# Patient Record
Sex: MALE | Race: OTHER | HISPANIC OR LATINO | URBAN - METROPOLITAN AREA
[De-identification: names, ages, dates, MRNs, and addresses within clinical notes are randomized per-mention and may not be internally consistent; named-entity substitution may affect disease eponyms.]

---

## 2020-03-10 ENCOUNTER — INPATIENT (INPATIENT)
Facility: HOSPITAL | Age: 40
LOS: 2 days | Discharge: HOME | DRG: 192 | End: 2020-03-13
Attending: STUDENT IN AN ORGANIZED HEALTH CARE EDUCATION/TRAINING PROGRAM | Admitting: FAMILY MEDICINE
Payer: COMMERCIAL

## 2020-03-10 VITALS — HEIGHT: 67 IN | WEIGHT: 167.99 LBS

## 2020-03-10 DIAGNOSIS — T82.9XXA UNSPECIFIED COMPLICATION OF CARDIAC AND VASCULAR PROSTHETIC DEVICE, IMPLANT AND GRAFT, INITIAL ENCOUNTER: ICD-10-CM

## 2020-03-10 LAB
ALBUMIN SERPL ELPH-MCNC: 3.9 G/DL — SIGNIFICANT CHANGE UP (ref 3.3–5)
ALP SERPL-CCNC: 105 U/L — SIGNIFICANT CHANGE UP (ref 40–120)
ALT FLD-CCNC: 69 U/L — SIGNIFICANT CHANGE UP (ref 12–78)
ANION GAP SERPL CALC-SCNC: 9 MMOL/L — SIGNIFICANT CHANGE UP (ref 5–17)
APTT BLD: 29.3 SEC — SIGNIFICANT CHANGE UP (ref 27.5–36.3)
AST SERPL-CCNC: 115 U/L — HIGH (ref 15–37)
BASOPHILS # BLD AUTO: 0.04 K/UL — SIGNIFICANT CHANGE UP (ref 0–0.2)
BASOPHILS NFR BLD AUTO: 0.8 % — SIGNIFICANT CHANGE UP (ref 0–2)
BILIRUB SERPL-MCNC: 0.7 MG/DL — SIGNIFICANT CHANGE UP (ref 0.2–1.2)
BUN SERPL-MCNC: 4 MG/DL — LOW (ref 7–23)
CALCIUM SERPL-MCNC: 8.7 MG/DL — SIGNIFICANT CHANGE UP (ref 8.5–10.1)
CHLORIDE SERPL-SCNC: 106 MMOL/L — SIGNIFICANT CHANGE UP (ref 96–108)
CO2 SERPL-SCNC: 24 MMOL/L — SIGNIFICANT CHANGE UP (ref 22–31)
CREAT SERPL-MCNC: 0.96 MG/DL — SIGNIFICANT CHANGE UP (ref 0.5–1.3)
EOSINOPHIL # BLD AUTO: 0.08 K/UL — SIGNIFICANT CHANGE UP (ref 0–0.5)
EOSINOPHIL NFR BLD AUTO: 1.7 % — SIGNIFICANT CHANGE UP (ref 0–6)
GLUCOSE SERPL-MCNC: 147 MG/DL — HIGH (ref 70–99)
HCT VFR BLD CALC: 45.1 % — SIGNIFICANT CHANGE UP (ref 39–50)
HGB BLD-MCNC: 13.7 G/DL — SIGNIFICANT CHANGE UP (ref 13–17)
IMM GRANULOCYTES NFR BLD AUTO: 0 % — SIGNIFICANT CHANGE UP (ref 0–1.5)
INR BLD: 1.16 RATIO — SIGNIFICANT CHANGE UP (ref 0.88–1.16)
LYMPHOCYTES # BLD AUTO: 1.01 K/UL — SIGNIFICANT CHANGE UP (ref 1–3.3)
LYMPHOCYTES # BLD AUTO: 21.3 % — SIGNIFICANT CHANGE UP (ref 13–44)
MAGNESIUM SERPL-MCNC: 2.1 MG/DL — SIGNIFICANT CHANGE UP (ref 1.6–2.6)
MCHC RBC-ENTMCNC: 23.9 PG — LOW (ref 27–34)
MCHC RBC-ENTMCNC: 30.4 GM/DL — LOW (ref 32–36)
MCV RBC AUTO: 78.7 FL — LOW (ref 80–100)
MONOCYTES # BLD AUTO: 0.44 K/UL — SIGNIFICANT CHANGE UP (ref 0–0.9)
MONOCYTES NFR BLD AUTO: 9.3 % — SIGNIFICANT CHANGE UP (ref 2–14)
NEUTROPHILS # BLD AUTO: 3.17 K/UL — SIGNIFICANT CHANGE UP (ref 1.8–7.4)
NEUTROPHILS NFR BLD AUTO: 66.9 % — SIGNIFICANT CHANGE UP (ref 43–77)
PHOSPHATE SERPL-MCNC: 3 MG/DL — SIGNIFICANT CHANGE UP (ref 2.5–4.5)
PLATELET # BLD AUTO: 129 K/UL — LOW (ref 150–400)
POTASSIUM SERPL-MCNC: 4.1 MMOL/L — SIGNIFICANT CHANGE UP (ref 3.5–5.3)
POTASSIUM SERPL-SCNC: 4.1 MMOL/L — SIGNIFICANT CHANGE UP (ref 3.5–5.3)
PROT SERPL-MCNC: 8.5 GM/DL — HIGH (ref 6–8.3)
PROTHROM AB SERPL-ACNC: 12.9 SEC — SIGNIFICANT CHANGE UP (ref 10–12.9)
RBC # BLD: 5.73 M/UL — SIGNIFICANT CHANGE UP (ref 4.2–5.8)
RBC # FLD: 15.9 % — HIGH (ref 10.3–14.5)
SODIUM SERPL-SCNC: 139 MMOL/L — SIGNIFICANT CHANGE UP (ref 135–145)
TROPONIN I SERPL-MCNC: 0.1 NG/ML — HIGH (ref 0.01–0.04)
TROPONIN I SERPL-MCNC: 0.11 NG/ML — HIGH (ref 0.01–0.04)
TROPONIN I SERPL-MCNC: <0.015 NG/ML — SIGNIFICANT CHANGE UP (ref 0.01–0.04)
WBC # BLD: 4.74 K/UL — SIGNIFICANT CHANGE UP (ref 3.8–10.5)
WBC # FLD AUTO: 4.74 K/UL — SIGNIFICANT CHANGE UP (ref 3.8–10.5)

## 2020-03-10 PROCEDURE — 81003 URINALYSIS AUTO W/O SCOPE: CPT

## 2020-03-10 PROCEDURE — C1894: CPT

## 2020-03-10 PROCEDURE — 83036 HEMOGLOBIN GLYCOSYLATED A1C: CPT

## 2020-03-10 PROCEDURE — 80053 COMPREHEN METABOLIC PANEL: CPT

## 2020-03-10 PROCEDURE — 93005 ELECTROCARDIOGRAM TRACING: CPT

## 2020-03-10 PROCEDURE — 85027 COMPLETE CBC AUTOMATED: CPT

## 2020-03-10 PROCEDURE — C1766: CPT

## 2020-03-10 PROCEDURE — 93613 INTRACARDIAC EPHYS 3D MAPG: CPT

## 2020-03-10 PROCEDURE — 71045 X-RAY EXAM CHEST 1 VIEW: CPT | Mod: 26

## 2020-03-10 PROCEDURE — 85025 COMPLETE CBC W/AUTO DIFF WBC: CPT

## 2020-03-10 PROCEDURE — 85610 PROTHROMBIN TIME: CPT

## 2020-03-10 PROCEDURE — C1732: CPT

## 2020-03-10 PROCEDURE — 93010 ELECTROCARDIOGRAM REPORT: CPT

## 2020-03-10 PROCEDURE — 84100 ASSAY OF PHOSPHORUS: CPT

## 2020-03-10 PROCEDURE — 99223 1ST HOSP IP/OBS HIGH 75: CPT

## 2020-03-10 PROCEDURE — 93623 PRGRMD STIMJ&PACG IV RX NFS: CPT

## 2020-03-10 PROCEDURE — 84484 ASSAY OF TROPONIN QUANT: CPT

## 2020-03-10 PROCEDURE — 93306 TTE W/DOPPLER COMPLETE: CPT

## 2020-03-10 PROCEDURE — 36415 COLL VENOUS BLD VENIPUNCTURE: CPT

## 2020-03-10 PROCEDURE — 80048 BASIC METABOLIC PNL TOTAL CA: CPT

## 2020-03-10 PROCEDURE — 93620 COMP EP EVL R AT VEN PAC&REC: CPT

## 2020-03-10 PROCEDURE — 80061 LIPID PANEL: CPT

## 2020-03-10 PROCEDURE — 83735 ASSAY OF MAGNESIUM: CPT

## 2020-03-10 PROCEDURE — C1730: CPT

## 2020-03-10 RX ORDER — METOPROLOL TARTRATE 50 MG
25 TABLET ORAL
Refills: 0 | Status: DISCONTINUED | OUTPATIENT
Start: 2020-03-10 | End: 2020-03-12

## 2020-03-10 RX ORDER — MORPHINE SULFATE 50 MG/1
2 CAPSULE, EXTENDED RELEASE ORAL EVERY 4 HOURS
Refills: 0 | Status: DISCONTINUED | OUTPATIENT
Start: 2020-03-10 | End: 2020-03-13

## 2020-03-10 RX ORDER — ACETAMINOPHEN 500 MG
650 TABLET ORAL EVERY 6 HOURS
Refills: 0 | Status: DISCONTINUED | OUTPATIENT
Start: 2020-03-10 | End: 2020-03-10

## 2020-03-10 RX ORDER — ACETAMINOPHEN 500 MG
1000 TABLET ORAL ONCE
Refills: 0 | Status: COMPLETED | OUTPATIENT
Start: 2020-03-10 | End: 2020-03-10

## 2020-03-10 RX ORDER — INFLUENZA VIRUS VACCINE 15; 15; 15; 15 UG/.5ML; UG/.5ML; UG/.5ML; UG/.5ML
0.5 SUSPENSION INTRAMUSCULAR ONCE
Refills: 0 | Status: DISCONTINUED | OUTPATIENT
Start: 2020-03-10 | End: 2020-03-13

## 2020-03-10 RX ORDER — LISINOPRIL 2.5 MG/1
2.5 TABLET ORAL DAILY
Refills: 0 | Status: DISCONTINUED | OUTPATIENT
Start: 2020-03-10 | End: 2020-03-11

## 2020-03-10 RX ORDER — ACETAMINOPHEN 500 MG
650 TABLET ORAL EVERY 4 HOURS
Refills: 0 | Status: DISCONTINUED | OUTPATIENT
Start: 2020-03-10 | End: 2020-03-13

## 2020-03-10 RX ORDER — ONDANSETRON 8 MG/1
4 TABLET, FILM COATED ORAL EVERY 6 HOURS
Refills: 0 | Status: DISCONTINUED | OUTPATIENT
Start: 2020-03-10 | End: 2020-03-13

## 2020-03-10 RX ORDER — ASPIRIN/CALCIUM CARB/MAGNESIUM 324 MG
243 TABLET ORAL ONCE
Refills: 0 | Status: COMPLETED | OUTPATIENT
Start: 2020-03-10 | End: 2020-03-10

## 2020-03-10 RX ORDER — ASPIRIN/CALCIUM CARB/MAGNESIUM 324 MG
81 TABLET ORAL DAILY
Refills: 0 | Status: DISCONTINUED | OUTPATIENT
Start: 2020-03-11 | End: 2020-03-13

## 2020-03-10 RX ADMIN — Medication 1000 MILLIGRAM(S): at 16:08

## 2020-03-10 RX ADMIN — Medication 1000 MILLIGRAM(S): at 15:38

## 2020-03-10 RX ADMIN — Medication 243 MILLIGRAM(S): at 15:38

## 2020-03-10 NOTE — ED PROVIDER NOTE - CLINICAL SUMMARY MEDICAL DECISION MAKING FREE TEXT BOX
Palpitations followed by AICD firing once last night and once today. Will check labs, EKG, EP for AICD interrogation, reeval for final dispo. Palpitations followed by AICD firing once last night and once today. Will check labs, EKG, EP for AICD interrogation. EP rec admisison

## 2020-03-10 NOTE — ED ADULT TRIAGE NOTE - CHIEF COMPLAINT QUOTE
pt reports pacemaker firing twice last night and once this morning. implanted in 2014. c/o pain to pacemaker site, denies chest pain and SOB. pt brought directly into intake room for EKG and VS.

## 2020-03-10 NOTE — PHARMACOTHERAPY INTERVENTION NOTE - COMMENTS
med history complete, reviewed medications with patient and confirmed with doctor first med profile med history complete, reviewed medications with patients med list and confirmed with doctor first med profile

## 2020-03-10 NOTE — H&P ADULT - HISTORY OF PRESENT ILLNESS
40 year old male patient with pertinent history of ischemic cardiomyopathy with AICD( Last known EF 15% in 2014) presented to the ED complaining of chest pain, dizziness and palpitations for one day. Symptoms preceded by 4 shocks being delivered by AICD. Patient last experienced firing of AICD 2 years ago.  Endorsed some localized discomfort to the site of AICD. Patient states he has not seen a Doctor in over a year and has been off of his medications for the same time period due to financial constraints.  Patient denies any fever, chills, sob, leg swelling, nausea or vomiting.

## 2020-03-10 NOTE — ED PROVIDER NOTE - NS ED MD DISPO SPECIAL CONSIDERATION1
END OF SHIFT NOTE: 
 
Intake/Output No intake/output data recorded. Voiding: YES Catheter: NO 
Drain:   
 
 
 
 
Stool:  0 occurrences. Stool Assessment Stool Color: Brown;Red streaks(per patient) (04/25/19 0245) Stool Appearance: Melena(minimal) (04/25/19 1915) Stool Amount: Medium (04/25/19 0245) Stool Source/Status: Rectum (04/25/19 0245) Emesis:  0 occurrences. VITAL SIGNS Patient Vitals for the past 12 hrs: 
 Temp Pulse Resp BP SpO2  
04/26/19 0320 97.7 °F (36.5 °C) 65 18 111/68 94 % 04/25/19 2305 98.2 °F (36.8 °C) 64 18 107/64 96 % 04/25/19 1905 98.2 °F (36.8 °C) 77 18 132/65 94 % Pain Assessment Pain 1 Pain Scale 1: Numeric (0 - 10) (04/26/19 1779) Pain Intensity 1: 0 (04/26/19 0218) Patient Stated Pain Goal: 0 (04/26/19 1785) Ambulating Yes Additional Information: Patient rested well. Accidentally pulled the right AC IV out when going to bathroom. Otherwise uneventful night. Shift report given to oncoming nurse at the bedside. Jd Chowdhury None

## 2020-03-10 NOTE — ED STATDOCS - PROGRESS NOTE DETAILS
Adele CHARLES for Dr. Brown: 41 y/o male presents to the ED c/o pacemaker firing. [place] a shock 2 hours ago. Pt reports mild pain to pacemaker site. Has hx of pacemaker shocking him before. Pt has a [employer] pacemaker placed by Dr. Mills on Oct, 2014. Will send pt to main ED for further evaluation. Adele CHARLES for Dr. Brown: Marion from EP informed pt is in ED, and needs interrogation for pacemaker

## 2020-03-10 NOTE — ED STATDOCS - NS_ ATTENDINGSCRIBEDETAILS _ED_A_ED_FT
I, Sanchez Brown MD, performed the initial face to face bedside interview with this patient regarding history of present illness and determined that the patient should be seen in the main ED.  The history, was documented by the scribe in my presence and I attest to the accuracy of the documentation.

## 2020-03-10 NOTE — ED PROVIDER NOTE - NS_BEDUNITTYPES_ED_ALL_ED
Detail Level: Zone Text: The above diagnosis and findings were noted on the exam but not discussed at this time with the patient. TELEMETRY

## 2020-03-10 NOTE — ED ADULT NURSE NOTE - CHPI ED NUR SYMPTOMS NEG
no syncope/no congestion/no chills/no nausea/no shortness of breath/no vomiting/no back pain/no diaphoresis/no dizziness/no fever

## 2020-03-10 NOTE — CONSULT NOTE ADULT - SUBJECTIVE AND OBJECTIVE BOX
HPI: 41 yo male with h/o nonischemic dilated cardiomyopathy, LBBB (chronic) with [employer] CRTD implant by dr Mills in 2014 for LVEF 15%, presented to  ED s/p ICD shock twice last night around 1am and again today around noon.  pt denies any symptoms of chest pain, dizziness or palpitations prior to ICD shocks. Pt poor historian, has not followed up with cardiolgists in the last couple of years, also states off meds for the past few months due to insurance problems. Previously he reports being coumadin, aspirin, metoprolol and lasix (that he can recall). he denies any prior surgeries, but reports having cardiac cath no stents done.     Device interrogation revealed device in backup VVI mode.  [employer] rep and tech support contacted to retrieve information.       PAST MEDICAL & SURGICAL HISTORY:      MEDICATIONS  (STANDING):    MEDICATIONS  (PRN):      Allergies    No Known Allergies    SOCIAL HISTORY: admits to drinking alcohol daily,  last drink was 2 days ago    FAMILY HISTORY: non contributory      Vital Signs Last 24 Hrs  T(C): 37 (10 Mar 2020 14:46), Max: 37 (10 Mar 2020 14:46)  T(F): 98.6 (10 Mar 2020 14:46), Max: 98.6 (10 Mar 2020 14:46)  HR: 82 (10 Mar 2020 15:39) (82 - 96)  BP: 128/85 (10 Mar 2020 15:39) (122/93 - 128/85)  BP(mean): 97 (10 Mar 2020 15:39) (97 - 97)  RR: 18 (10 Mar 2020 14:46) (18 - 18)  SpO2: 99% (10 Mar 2020 14:46) (99% - 99%)      CONSTITUTIONAL: No fever, weight loss, chills, shakes, or fatigue  EYES: No eye pain, visual disturbances, or discharge  ENMT:  No difficulty hearing, tinnitus, vertigo; No sinus or throat pain  RESPIRATORY: No cough, wheezing, hemoptysis, or shortness of breath  CARDIOVASCULAR: No chest pain, dyspnea, palpitations, dizziness, syncope  GASTROINTESTINAL: No abdominal  pain, nausea, vomiting, diarrhea, constipation, melena or bright red blood.  GENITOURINARY: No dysuria, nocturia, hematuria, or urinary incontinence  NEUROLOGICAL: No headaches, memory loss, slurred speech, limb weakness, loss of strength, numbness, or tremors  SKIN: No itching, burning, rashes, or lesions   LYMPH NODES: No enlarged glands  ENDOCRINE: No heat or cold intolerance, or hair loss  MUSCULOSKELETAL: No joint pain or swelling, muscle, back, or extremity pain  PSYCHIATRIC: No depression, anxiety, or difficulty sleeping  HEME/LYMPH: No easy bruising or bleeding gums  ALLERY AND IMMUNOLOGIC: No hives or rash.    PHYSICAL EXAMINATION:    Constitutional: NAD, awake and alert, well-developed  HEENT: PERR, EOMI,  No oral cyananosis.  Neck:  supple,  No JVD  Respiratory: Breath sounds are clear bilaterally, No wheezing, rales or rhonchi  Cardiovascular: S1 and S2, regular rate and rhythm, no Murmurs, gallops or rubs  Gastrointestinal: Bowel Sounds present, soft, nontender.   Extremities: No peripheral edema. No clubbing or cyanosis.  Vascular: 2+ peripheral pulses  Neurological: A/O x 3, no focal deficits  Musculoskeletal: no calf tenderness.  Skin: No rashes.      LABS:                        13.7   4.74  )-----------( 129      ( 10 Mar 2020 15:16 )             45.1   03-10    139  |  106  |  4<L>  ----------------------------<  147<H>  4.1   |  24  |  0.96    Ca    8.7      10 Mar 2020 15:16  Phos  3.0     03-10  Mg     2.1     03-10    TPro  8.5<H>  /  Alb  3.9  /  TBili  0.7  /  DBili  x   /  AST  115<H>  /  ALT  69  /  AlkPhos  105  03-10  LIVER FUNCTIONS - ( 10 Mar 2020 15:16 )  Alb: 3.9 g/dL / Pro: 8.5 gm/dL / ALK PHOS: 105 U/L / ALT: 69 U/L / AST: 115 U/L / GGT: x           PT/INR - ( 10 Mar 2020 15:16 )   PT: 12.9 sec;   INR: 1.16 ratio         PTT - ( 10 Mar 2020 15:16 )  PTT:29.3 secCARDIAC MARKERS ( 10 Mar 2020 15:16 )  <0.015 ng/mL / x     / x     / x     / x                EKG: 3/10/20 sinus rhythm 86bpm, LBBB,  Billy 170ms, QRS 166ms,      TELEMETRY: sinus rhythm

## 2020-03-10 NOTE — ED PROVIDER NOTE - PROGRESS NOTE DETAILS
marlo: Discussed need to admit with patient & discussed risk and benefits.  Patient agreed to admission.  Discussed case w/ admitting doctor - agreed to admit to their service. will place bridge orders. Accepting physician APPROVED PT TO MOVE   prior to inpatient team evaluation

## 2020-03-10 NOTE — H&P ADULT - ASSESSMENT
40 year old male patient with history of ischemic cardiomyopathy presenting with chest pain and palpitations after 4 shocks being delivered by AICD      -Admit to Tele      #AICD firing  -pt currently asymptomatic  -AICD interrogated by EP  -currently being followed by EP  -monitor on Tele  -restart on low dose metoprolol  -get morning echo    #CAD s/p AICD  -pt with chest pain and palpitations  -get morning echo  -restart pt on betablocker  -start daily asa  -get lipid panel  -cardiology evaluation    #Hx of HTN  -start on lisinopril    #Social concern  -get social work consult for assistance with medications as an outpatient    #DVT ppx  -encourage ambulation

## 2020-03-10 NOTE — ED ADULT NURSE NOTE - OBJECTIVE STATEMENT
Pt comes in because his [employer] defibrillator fired 2x last night and 2x this afternoon last time about 2 hours ago. states 7/10 chest pain now. had palpitations before fired. denies sob currently. aox3. takes 81mg aspirin daily. states last time it was checked was 1-2 years ago.

## 2020-03-10 NOTE — ED PROVIDER NOTE - OBJECTIVE STATEMENT
Pertinent HPI/PMH/PSH/FHx/SHx and Review of Systems contained within  HPI:  40yoM  p/w CC pacemaker firing. Reports that it fired once last night and once this morning. States he had palpitations and then felt a shock 2 hours PTA. Now reports mild pain to pacemaker site. Has had similar episode in the past. Pt has a [employer] pacemaker placed by cardiologist Dr. Mills in Oct, 2014. Pt takes daily 81mg ASA. Last time he had AICD checked was 1-2 years ago. +former smoker. +social EtOH use.  PMH/PSH relevant for: cardiomyopathy s/p AICD.  ROS negative for: fever, Chest pain, SOB, Nausea, vomiting, diarrhea, abdominal pain, dysuria, or drug use.   FamilyHx and SocialHx not otherwise contributory

## 2020-03-10 NOTE — CONSULT NOTE ADULT - ASSESSMENT
39 yo male with non-ischemic cardiomyopathy admitted after ICD fired 4times.    [employer] CRTD found to be in VVI back up mode, unclear reason, no data available for review, tech support contacted.     Plan:  admit to tele  echo to evaluate LVEF  restart medical therapy for heart failure -  general cards evaluation  EP following

## 2020-03-10 NOTE — H&P ADULT - NSHPPHYSICALEXAM_GEN_ALL_CORE
Vital Signs Last 24 Hrs  T(C): 37 (10 Mar 2020 14:46), Max: 37 (10 Mar 2020 14:46)  T(F): 98.6 (10 Mar 2020 14:46), Max: 98.6 (10 Mar 2020 14:46)  HR: 83 (10 Mar 2020 18:24) (82 - 96)  BP: 125/94 (10 Mar 2020 18:24) (122/93 - 128/85)  BP(mean): 97 (10 Mar 2020 15:39) (97 - 97)  RR: 18 (10 Mar 2020 18:24) (18 - 18)  SpO2: 100% (10 Mar 2020 18:24) (99% - 100%)

## 2020-03-10 NOTE — SBIRT NOTE ADULT - NSSBIRTUNABLESCROTHER_GEN_A_CORE
Meeting with patient attempted however Full Screen Not Performed due to  Provider at bedside. pt denies drinking alcohol at this time

## 2020-03-11 DIAGNOSIS — I42.9 CARDIOMYOPATHY, UNSPECIFIED: ICD-10-CM

## 2020-03-11 DIAGNOSIS — T82.9XXA UNSPECIFIED COMPLICATION OF CARDIAC AND VASCULAR PROSTHETIC DEVICE, IMPLANT AND GRAFT, INITIAL ENCOUNTER: ICD-10-CM

## 2020-03-11 DIAGNOSIS — I10 ESSENTIAL (PRIMARY) HYPERTENSION: ICD-10-CM

## 2020-03-11 LAB
ALBUMIN SERPL ELPH-MCNC: 3.5 G/DL — SIGNIFICANT CHANGE UP (ref 3.3–5)
ALP SERPL-CCNC: 105 U/L — SIGNIFICANT CHANGE UP (ref 40–120)
ALT FLD-CCNC: 60 U/L — SIGNIFICANT CHANGE UP (ref 12–78)
ANION GAP SERPL CALC-SCNC: 5 MMOL/L — SIGNIFICANT CHANGE UP (ref 5–17)
AST SERPL-CCNC: 77 U/L — HIGH (ref 15–37)
BASOPHILS # BLD AUTO: 0.04 K/UL — SIGNIFICANT CHANGE UP (ref 0–0.2)
BASOPHILS NFR BLD AUTO: 0.7 % — SIGNIFICANT CHANGE UP (ref 0–2)
BILIRUB SERPL-MCNC: 0.8 MG/DL — SIGNIFICANT CHANGE UP (ref 0.2–1.2)
BUN SERPL-MCNC: 12 MG/DL — SIGNIFICANT CHANGE UP (ref 7–23)
CALCIUM SERPL-MCNC: 8.9 MG/DL — SIGNIFICANT CHANGE UP (ref 8.5–10.1)
CHLORIDE SERPL-SCNC: 105 MMOL/L — SIGNIFICANT CHANGE UP (ref 96–108)
CHOLEST SERPL-MCNC: 126 MG/DL — SIGNIFICANT CHANGE UP (ref 10–199)
CO2 SERPL-SCNC: 26 MMOL/L — SIGNIFICANT CHANGE UP (ref 22–31)
CREAT SERPL-MCNC: 0.94 MG/DL — SIGNIFICANT CHANGE UP (ref 0.5–1.3)
EOSINOPHIL # BLD AUTO: 0.1 K/UL — SIGNIFICANT CHANGE UP (ref 0–0.5)
EOSINOPHIL NFR BLD AUTO: 1.9 % — SIGNIFICANT CHANGE UP (ref 0–6)
GLUCOSE SERPL-MCNC: 100 MG/DL — HIGH (ref 70–99)
HBA1C BLD-MCNC: 6.4 % — HIGH (ref 4–5.6)
HCT VFR BLD CALC: 43.8 % — SIGNIFICANT CHANGE UP (ref 39–50)
HDLC SERPL-MCNC: 48 MG/DL — SIGNIFICANT CHANGE UP
HGB BLD-MCNC: 12.9 G/DL — LOW (ref 13–17)
IMM GRANULOCYTES NFR BLD AUTO: 0.4 % — SIGNIFICANT CHANGE UP (ref 0–1.5)
INR BLD: 1.17 RATIO — HIGH (ref 0.88–1.16)
LIPID PNL WITH DIRECT LDL SERPL: 58 MG/DL — SIGNIFICANT CHANGE UP
LYMPHOCYTES # BLD AUTO: 1.85 K/UL — SIGNIFICANT CHANGE UP (ref 1–3.3)
LYMPHOCYTES # BLD AUTO: 34.4 % — SIGNIFICANT CHANGE UP (ref 13–44)
MAGNESIUM SERPL-MCNC: 2.3 MG/DL — SIGNIFICANT CHANGE UP (ref 1.6–2.6)
MCHC RBC-ENTMCNC: 23.7 PG — LOW (ref 27–34)
MCHC RBC-ENTMCNC: 29.5 GM/DL — LOW (ref 32–36)
MCV RBC AUTO: 80.4 FL — SIGNIFICANT CHANGE UP (ref 80–100)
MONOCYTES # BLD AUTO: 0.65 K/UL — SIGNIFICANT CHANGE UP (ref 0–0.9)
MONOCYTES NFR BLD AUTO: 12.1 % — SIGNIFICANT CHANGE UP (ref 2–14)
NEUTROPHILS # BLD AUTO: 2.72 K/UL — SIGNIFICANT CHANGE UP (ref 1.8–7.4)
NEUTROPHILS NFR BLD AUTO: 50.5 % — SIGNIFICANT CHANGE UP (ref 43–77)
PHOSPHATE SERPL-MCNC: 3.5 MG/DL — SIGNIFICANT CHANGE UP (ref 2.5–4.5)
PLATELET # BLD AUTO: 132 K/UL — LOW (ref 150–400)
POTASSIUM SERPL-MCNC: 3.8 MMOL/L — SIGNIFICANT CHANGE UP (ref 3.5–5.3)
POTASSIUM SERPL-SCNC: 3.8 MMOL/L — SIGNIFICANT CHANGE UP (ref 3.5–5.3)
PROT SERPL-MCNC: 7.9 GM/DL — SIGNIFICANT CHANGE UP (ref 6–8.3)
PROTHROM AB SERPL-ACNC: 13.1 SEC — HIGH (ref 10–12.9)
RBC # BLD: 5.45 M/UL — SIGNIFICANT CHANGE UP (ref 4.2–5.8)
RBC # FLD: 16 % — HIGH (ref 10.3–14.5)
SODIUM SERPL-SCNC: 136 MMOL/L — SIGNIFICANT CHANGE UP (ref 135–145)
TOTAL CHOLESTEROL/HDL RATIO MEASUREMENT: 2.6 RATIO — LOW (ref 3.4–9.6)
TRIGL SERPL-MCNC: 97 MG/DL — SIGNIFICANT CHANGE UP (ref 10–149)
TROPONIN I SERPL-MCNC: 0.07 NG/ML — HIGH (ref 0.01–0.04)
WBC # BLD: 5.38 K/UL — SIGNIFICANT CHANGE UP (ref 3.8–10.5)
WBC # FLD AUTO: 5.38 K/UL — SIGNIFICANT CHANGE UP (ref 3.8–10.5)

## 2020-03-11 PROCEDURE — 93010 ELECTROCARDIOGRAM REPORT: CPT

## 2020-03-11 PROCEDURE — 99232 SBSQ HOSP IP/OBS MODERATE 35: CPT

## 2020-03-11 PROCEDURE — 99223 1ST HOSP IP/OBS HIGH 75: CPT

## 2020-03-11 PROCEDURE — 93306 TTE W/DOPPLER COMPLETE: CPT | Mod: 26

## 2020-03-11 RX ORDER — SACUBITRIL AND VALSARTAN 24; 26 MG/1; MG/1
1 TABLET, FILM COATED ORAL
Refills: 0 | Status: DISCONTINUED | OUTPATIENT
Start: 2020-03-11 | End: 2020-03-13

## 2020-03-11 RX ADMIN — Medication 25 MILLIGRAM(S): at 06:24

## 2020-03-11 RX ADMIN — LISINOPRIL 2.5 MILLIGRAM(S): 2.5 TABLET ORAL at 06:24

## 2020-03-11 RX ADMIN — Medication 25 MILLIGRAM(S): at 17:48

## 2020-03-11 RX ADMIN — Medication 81 MILLIGRAM(S): at 11:41

## 2020-03-11 RX ADMIN — SACUBITRIL AND VALSARTAN 1 TABLET(S): 24; 26 TABLET, FILM COATED ORAL at 17:48

## 2020-03-11 NOTE — PROGRESS NOTE ADULT - ASSESSMENT
40 year old male patient with history of ischemic cardiomyopathy presenting with chest pain and palpitations after 4 shocks being delivered by AICD      #AICD firing - 4 shocks   -pt currently asymptomatic  -AICD interrogated by EP  -currently being followed by EP  -monitor on Tele  -restart on low dose metoprolol  -f/u TTE (last noted EF 15% in 2014)    #CAD s/p AICD  -pt with chest pain and palpitations 2/2 AICD firing   -get morning echo  -restart pt on betablocker  -start daily asa  -cardiology evaluation    #Hx of HTN  -start on lisinopril    #Elevated trops - peaked to 0.106  due to demand ischemia - AICD firing  Cardiology and EP following     #Social concern  -get social work consult for assistance with medications as an outpatient  Pt. stopped seeing his EP cadiologist - Dr. Mills more than a year ago  because of lack of insurance and stopped all meds

## 2020-03-11 NOTE — CONSULT NOTE ADULT - SUBJECTIVE AND OBJECTIVE BOX
PCP:    REQUESTING PHYSICIAN:    REASON FOR CONSULT:    CHIEF COMPLAINT:    HPI:  40 year old male patient with pertinent history of ischemic cardiomyopathy with AICD( Last known EF 15% in 2014) presented to the ED complaining of chest pain, dizziness and palpitations for one day. Symptoms preceded by 4 shocks being delivered by AICD. Patient last experienced firing of AICD 2 years ago.  Endorsed some localized discomfort to the site of AICD. Patient states he has not seen a Doctor in over a year and has been off of his medications for the same time period due to financial constraints.  Patient denies any fever, chills, sob, leg swelling, nausea or vomiting. (10 Mar 2020 19:26)  Cardiology requested to evaluate symptoms and optimize medical regimen. He denies chest pain. He reports feeling weak with minimal exertion.       PAST MEDICAL & SURGICAL HISTORY:  HTN (hypertension)  Pacemaker      Allergies    No Known Allergies    Intolerances        SOCIAL HISTORY:    FAMILY HISTORY:  FH: HTN (hypertension)      MEDICATIONS:  MEDICATIONS  (STANDING):  aspirin  chewable 81 milliGRAM(s) Oral daily  influenza   Vaccine 0.5 milliLiter(s) IntraMuscular once  lisinopril 2.5 milliGRAM(s) Oral daily  metoprolol tartrate 25 milliGRAM(s) Oral two times a day    MEDICATIONS  (PRN):  acetaminophen   Tablet .. 650 milliGRAM(s) Oral every 4 hours PRN Temp greater or equal to 38C (100.4F), Mild Pain (1 - 3)  morphine  - Injectable 2 milliGRAM(s) IV Push every 4 hours PRN Moderate Pain (4 - 6)  ondansetron Injectable 4 milliGRAM(s) IV Push every 6 hours PRN Nausea      REVIEW OF SYSTEMS:    CONSTITUTIONAL: Weakness  EYES/ENT: No visual changes;  No vertigo or throat pain   NECK: No pain or stiffness  RESPIRATORY: No cough, wheezing, hemoptysis; No shortness of breath  CARDIOVASCULAR: Chest pain after ICD shocks.  GASTROINTESTINAL: No abdominal or epigastric pain. No nausea, vomiting, or hematemesis; No diarrhea or constipation. No melena or hematochezia.  GENITOURINARY: No dysuria, frequency or hematuria  NEUROLOGICAL: No numbness or weakness  SKIN: No itching, burning, rashes, or lesions   All other review of systems is negative unless indicated above    Vital Signs Last 24 Hrs  T(C): 37.1 (11 Mar 2020 11:09), Max: 37.1 (11 Mar 2020 11:09)  T(F): 98.8 (11 Mar 2020 11:09), Max: 98.8 (11 Mar 2020 11:09)  HR: 63 (11 Mar 2020 11:09) (63 - 96)  BP: 116/70 (11 Mar 2020 11:09) (116/70 - 136/87)  BP(mean): 97 (10 Mar 2020 15:39) (97 - 97)  RR: 18 (11 Mar 2020 11:09) (17 - 18)  SpO2: 99% (11 Mar 2020 11:09) (97% - 100%)    I&O's Summary    11 Mar 2020 07:01  -  11 Mar 2020 14:44  --------------------------------------------------------  IN: 0 mL / OUT: 250 mL / NET: -250 mL        PHYSICAL EXAM:    Constitutional: NAD, awake and alert, well-developed  HEENT: PERR, EOMI,  No oral cyananosis.  Neck:  supple,  No JVD  Respiratory: Breath sounds are clear bilaterally, No wheezing, rales or rhonchi  Cardiovascular: S1 and S2, regular rate and rhythm, no Murmurs, gallops or rubs  Gastrointestinal: Bowel Sounds present, soft, nontender.   Extremities: No peripheral edema. No clubbing or cyanosis.  Vascular: 2+ peripheral pulses  Neurological: A/O x 3, no focal deficits  Musculoskeletal: no calf tenderness.  Skin: No rashes.      LABS: All Labs Reviewed:                        12.9   5.38  )-----------( 132      ( 11 Mar 2020 07:46 )             43.8                         13.7   4.74  )-----------( 129      ( 10 Mar 2020 15:16 )             45.1     11 Mar 2020 07:46    136    |  105    |  12     ----------------------------<  100    3.8     |  26     |  0.94   10 Mar 2020 15:16    139    |  106    |  4      ----------------------------<  147    4.1     |  24     |  0.96     Ca    8.9        11 Mar 2020 07:46  Ca    8.7        10 Mar 2020 15:16  Phos  3.5       11 Mar 2020 07:46  Phos  3.0       10 Mar 2020 15:16  Mg     2.3       11 Mar 2020 07:46  Mg     2.1       10 Mar 2020 15:16    TPro  7.9    /  Alb  3.5    /  TBili  0.8    /  DBili  x      /  AST  77     /  ALT  60     /  AlkPhos  105    11 Mar 2020 07:46  TPro  8.5    /  Alb  3.9    /  TBili  0.7    /  DBili  x      /  AST  115    /  ALT  69     /  AlkPhos  105    10 Mar 2020 15:16    PT/INR - ( 11 Mar 2020 07:46 )   PT: 13.1 sec;   INR: 1.17 ratio         PTT - ( 10 Mar 2020 15:16 )  PTT:29.3 sec  CARDIAC MARKERS ( 11 Mar 2020 00:43 )  0.072 ng/mL / x     / x     / x     / x      CARDIAC MARKERS ( 10 Mar 2020 21:57 )  0.098 ng/mL / x     / x     / x     / x      CARDIAC MARKERS ( 10 Mar 2020 18:21 )  0.106 ng/mL / x     / x     / x     / x      CARDIAC MARKERS ( 10 Mar 2020 15:16 )  <0.015 ng/mL / x     / x     / x     / x          Blood Culture:         RADIOLOGY/EKG:  < from: 12 Lead ECG (03.11.20 @ 07:44) >    Diagnosis Line Atrial-sensed ventricular-paced rhythm  Abnormal ECG    Reconfirmed by HASMUKH THOMASON, NICOLE ([id number]) on 3/11/2020 10:34:46 AM    < end of copied text >

## 2020-03-11 NOTE — CONSULT NOTE ADULT - PROBLEM SELECTOR RECOMMENDATION 3
Echo, Entresto, may need to reduce BB for blood pressure. Pt should be referred for transplant evaluation at [facility] when stable.

## 2020-03-12 DIAGNOSIS — Z86.79 PERSONAL HISTORY OF OTHER DISEASES OF THE CIRCULATORY SYSTEM: ICD-10-CM

## 2020-03-12 DIAGNOSIS — Z45.02 ENCOUNTER FOR ADJUSTMENT AND MANAGEMENT OF AUTOMATIC IMPLANTABLE CARDIAC DEFIBRILLATOR: ICD-10-CM

## 2020-03-12 DIAGNOSIS — I47.1 SUPRAVENTRICULAR TACHYCARDIA: ICD-10-CM

## 2020-03-12 PROBLEM — I10 ESSENTIAL (PRIMARY) HYPERTENSION: Chronic | Status: ACTIVE | Noted: 2020-03-10

## 2020-03-12 PROBLEM — Z95.0 PRESENCE OF CARDIAC PACEMAKER: Chronic | Status: ACTIVE | Noted: 2020-03-10

## 2020-03-12 LAB
ANION GAP SERPL CALC-SCNC: 7 MMOL/L — SIGNIFICANT CHANGE UP (ref 5–17)
APPEARANCE UR: CLEAR — SIGNIFICANT CHANGE UP
BILIRUB UR-MCNC: NEGATIVE — SIGNIFICANT CHANGE UP
BUN SERPL-MCNC: 11 MG/DL — SIGNIFICANT CHANGE UP (ref 7–23)
CALCIUM SERPL-MCNC: 8.8 MG/DL — SIGNIFICANT CHANGE UP (ref 8.5–10.1)
CHLORIDE SERPL-SCNC: 104 MMOL/L — SIGNIFICANT CHANGE UP (ref 96–108)
CO2 SERPL-SCNC: 27 MMOL/L — SIGNIFICANT CHANGE UP (ref 22–31)
COLOR SPEC: YELLOW — SIGNIFICANT CHANGE UP
CREAT SERPL-MCNC: 0.98 MG/DL — SIGNIFICANT CHANGE UP (ref 0.5–1.3)
DIFF PNL FLD: NEGATIVE — SIGNIFICANT CHANGE UP
GLUCOSE SERPL-MCNC: 113 MG/DL — HIGH (ref 70–99)
GLUCOSE UR QL: NEGATIVE MG/DL — SIGNIFICANT CHANGE UP
HCT VFR BLD CALC: 44.5 % — SIGNIFICANT CHANGE UP (ref 39–50)
HGB BLD-MCNC: 14 G/DL — SIGNIFICANT CHANGE UP (ref 13–17)
KETONES UR-MCNC: NEGATIVE — SIGNIFICANT CHANGE UP
LEUKOCYTE ESTERASE UR-ACNC: NEGATIVE — SIGNIFICANT CHANGE UP
MCHC RBC-ENTMCNC: 24.9 PG — LOW (ref 27–34)
MCHC RBC-ENTMCNC: 31.5 GM/DL — LOW (ref 32–36)
MCV RBC AUTO: 79 FL — LOW (ref 80–100)
NITRITE UR-MCNC: NEGATIVE — SIGNIFICANT CHANGE UP
PH UR: 6 — SIGNIFICANT CHANGE UP (ref 5–8)
PLATELET # BLD AUTO: 130 K/UL — LOW (ref 150–400)
POTASSIUM SERPL-MCNC: 3.6 MMOL/L — SIGNIFICANT CHANGE UP (ref 3.5–5.3)
POTASSIUM SERPL-SCNC: 3.6 MMOL/L — SIGNIFICANT CHANGE UP (ref 3.5–5.3)
PROT UR-MCNC: NEGATIVE MG/DL — SIGNIFICANT CHANGE UP
RBC # BLD: 5.63 M/UL — SIGNIFICANT CHANGE UP (ref 4.2–5.8)
RBC # FLD: 16 % — HIGH (ref 10.3–14.5)
SODIUM SERPL-SCNC: 138 MMOL/L — SIGNIFICANT CHANGE UP (ref 135–145)
SP GR SPEC: 1.01 — SIGNIFICANT CHANGE UP (ref 1.01–1.02)
UROBILINOGEN FLD QL: NEGATIVE MG/DL — SIGNIFICANT CHANGE UP
WBC # BLD: 6.89 K/UL — SIGNIFICANT CHANGE UP (ref 3.8–10.5)
WBC # FLD AUTO: 6.89 K/UL — SIGNIFICANT CHANGE UP (ref 3.8–10.5)

## 2020-03-12 PROCEDURE — 93621 COMP EP EVL L PAC&REC C SINS: CPT | Mod: 26

## 2020-03-12 PROCEDURE — 99233 SBSQ HOSP IP/OBS HIGH 50: CPT

## 2020-03-12 PROCEDURE — 93620 COMP EP EVL R AT VEN PAC&REC: CPT | Mod: 26

## 2020-03-12 PROCEDURE — 93613 INTRACARDIAC EPHYS 3D MAPG: CPT

## 2020-03-12 PROCEDURE — 99233 SBSQ HOSP IP/OBS HIGH 50: CPT | Mod: 25

## 2020-03-12 PROCEDURE — 93010 ELECTROCARDIOGRAM REPORT: CPT

## 2020-03-12 PROCEDURE — 99231 SBSQ HOSP IP/OBS SF/LOW 25: CPT

## 2020-03-12 RX ORDER — POTASSIUM CHLORIDE 20 MEQ
40 PACKET (EA) ORAL ONCE
Refills: 0 | Status: DISCONTINUED | OUTPATIENT
Start: 2020-03-12 | End: 2020-03-12

## 2020-03-12 RX ORDER — METOPROLOL TARTRATE 50 MG
50 TABLET ORAL
Refills: 0 | Status: DISCONTINUED | OUTPATIENT
Start: 2020-03-12 | End: 2020-03-13

## 2020-03-12 RX ORDER — POTASSIUM CHLORIDE 20 MEQ
40 PACKET (EA) ORAL ONCE
Refills: 0 | Status: COMPLETED | OUTPATIENT
Start: 2020-03-12 | End: 2020-03-12

## 2020-03-12 RX ORDER — ONDANSETRON 8 MG/1
4 TABLET, FILM COATED ORAL ONCE
Refills: 0 | Status: DISCONTINUED | OUTPATIENT
Start: 2020-03-12 | End: 2020-03-13

## 2020-03-12 RX ORDER — SODIUM CHLORIDE 9 MG/ML
1000 INJECTION, SOLUTION INTRAVENOUS
Refills: 0 | Status: DISCONTINUED | OUTPATIENT
Start: 2020-03-12 | End: 2020-03-13

## 2020-03-12 RX ORDER — ISOPROTERENOL HYDROCHLORIDE 1 MG/5ML
1 INJECTION, SOLUTION INTRACARDIAC; INTRAMUSCULAR; INTRAVENOUS; SUBCUTANEOUS
Qty: 1 | Refills: 0 | Status: DISCONTINUED | OUTPATIENT
Start: 2020-03-12 | End: 2020-03-12

## 2020-03-12 RX ORDER — FENTANYL CITRATE 50 UG/ML
25 INJECTION INTRAVENOUS
Refills: 0 | Status: DISCONTINUED | OUTPATIENT
Start: 2020-03-12 | End: 2020-03-13

## 2020-03-12 RX ADMIN — Medication 25 MILLIGRAM(S): at 06:14

## 2020-03-12 RX ADMIN — SACUBITRIL AND VALSARTAN 1 TABLET(S): 24; 26 TABLET, FILM COATED ORAL at 19:29

## 2020-03-12 RX ADMIN — Medication 81 MILLIGRAM(S): at 11:56

## 2020-03-12 RX ADMIN — MORPHINE SULFATE 2 MILLIGRAM(S): 50 CAPSULE, EXTENDED RELEASE ORAL at 22:03

## 2020-03-12 RX ADMIN — SACUBITRIL AND VALSARTAN 1 TABLET(S): 24; 26 TABLET, FILM COATED ORAL at 06:14

## 2020-03-12 RX ADMIN — Medication 40 MILLIEQUIVALENT(S): at 11:56

## 2020-03-12 RX ADMIN — Medication 50 MILLIGRAM(S): at 19:28

## 2020-03-12 NOTE — PROGRESS NOTE ADULT - ATTENDING COMMENTS
Patient seen and examined with the NP. Agree with above plan of care which was discussed with the patient, family, team and NP.
Patient seen and examined with the NP. Agree with above plan of care which was discussed with the patient, family, team and NP.

## 2020-03-12 NOTE — PACU DISCHARGE NOTE - COMMENTS
Rosmery rn gave report to Yong sandra on [facility].  Pt is alert and oriented x 3. vital signs stable. Bilateral groins site soft no bleeding or hematoma noted.  bilateral pedal pulses.  Pt transferred to the floor accompaned by rn.  Tele confirmed by [facility].  Safety maintained.

## 2020-03-12 NOTE — PROCEDURE NOTE - ADDITIONAL PROCEDURE DETAILS
Inappropriate sinus tachycardia  increase metoprolol gradually as tolerated  aerobic exercise  ICD tachytherapy was turned on and programmed to higher VT zone to avoid inappropriate ICD shocks.

## 2020-03-12 NOTE — PROGRESS NOTE ADULT - ASSESSMENT
40 year old male patient with history of ischemic cardiomyopathy presenting with chest pain and palpitations after 4 shocks being delivered by AICD    # SVT   - started on BB  - IAIN/ablation today  - EP following    #AICD firing - 4 shocks   -pt currently asymptomatic  -AICD interrogated by EP  -currently being followed by EP  -monitor on Tele  -restart on low dose metoprolol  -f/u TTE (last noted EF 15% in 2014)    # Cardiomyopathy EF 15%  - started on entresto  - Cardiology consult appreciated     #CAD s/p AICD  -pt with chest pain and palpitations 2/2 AICD firing   - f/u TTE    #Elevated trops - peaked to 0.106  due to demand ischemia - AICD firing  Cardiology and EP following     #Social concern  - SW consult   Pt. stopped seeing his EP cadiologist - Dr. Mills more than a year ago  because of lack of insurance and stopped all meds 40 year old male patient with history of ischemic cardiomyopathy presenting with chest pain and palpitations after 4 shocks being delivered by AICD    # SVT   - started on BB  - IAIN/ablation today  - EP following  keep K more than 4, mg more than 2    #AICD firing - 4 shocks   -pt currently asymptomatic  -AICD interrogated by EP  -currently being followed by EP  -monitor on Tele  -restart on low dose metoprolol  -f/u TTE (last noted EF 15% in 2014)    # Cardiomyopathy EF 15%  - started on entresto  - Cardiology consult appreciated     #CAD s/p AICD  -pt with chest pain and palpitations 2/2 AICD firing   - f/u TTE    #Elevated trops - peaked to 0.106  due to demand ischemia - AICD firing  Cardiology and EP following     #Social concern  - SW consult   Pt. stopped seeing his EP cadiologist - Dr. Mills more than a year ago  because of lack of insurance and stopped all meds

## 2020-03-12 NOTE — PROCEDURE NOTE - NSICDXPROCEDURE_GEN_ALL_CORE_FT
PROCEDURES:  Cardiac mapping, using 3D electroanatomic mapping system 12-Mar-2020 18:25:48  Kostas Mcguire  Full electrophysiology study 12-Mar-2020 18:25:32  Kostas Mcguire

## 2020-03-12 NOTE — PROCEDURE NOTE - GENERAL PROCEDURE DETAILS
EP study was performed, isupril infusion resulted in abrupt tachycardia to 150 bpm, activation map showed origin of tachycardia to be sinus.

## 2020-03-12 NOTE — PROGRESS NOTE ADULT - ASSESSMENT
40 year old man with a history of non-ischemic cardiomyopathy with severe LV dysfunction (2014 EF = 15%), s/p CRT-D, noncompliant with medical Tx & f/u, admitted on 3/10/2020 after several ICD shocks. Now repeat echo: EF 50%.  ICD interrogation revealed episode of SVT @ 180's bpm, inappropriate ICD shock x4 for SVT. 40 year old man with a history of non-ischemic cardiomyopathy with severe LV dysfunction (2014 EF = 15%,, s/p CRT-D significant improvement in LVEF, noncompliant with medical Tx & f/u, admitted on 3/10/2020 after several ICD shocks. Now repeat echo: EF 50%.  ICD interrogation revealed episode of SVT @ 170-180's bpm, inappropriate ICD shock x4 for SVT.

## 2020-03-13 ENCOUNTER — TRANSCRIPTION ENCOUNTER (OUTPATIENT)
Age: 40
End: 2020-03-13

## 2020-03-13 VITALS
RESPIRATION RATE: 18 BRPM | TEMPERATURE: 98 F | SYSTOLIC BLOOD PRESSURE: 120 MMHG | HEART RATE: 77 BPM | DIASTOLIC BLOOD PRESSURE: 78 MMHG | OXYGEN SATURATION: 97 %

## 2020-03-13 PROBLEM — Z00.00 ENCOUNTER FOR PREVENTIVE HEALTH EXAMINATION: Status: ACTIVE | Noted: 2020-03-13

## 2020-03-13 LAB
ANION GAP SERPL CALC-SCNC: 5 MMOL/L — SIGNIFICANT CHANGE UP (ref 5–17)
BUN SERPL-MCNC: 9 MG/DL — SIGNIFICANT CHANGE UP (ref 7–23)
CALCIUM SERPL-MCNC: 8.8 MG/DL — SIGNIFICANT CHANGE UP (ref 8.5–10.1)
CHLORIDE SERPL-SCNC: 105 MMOL/L — SIGNIFICANT CHANGE UP (ref 96–108)
CO2 SERPL-SCNC: 26 MMOL/L — SIGNIFICANT CHANGE UP (ref 22–31)
CREAT SERPL-MCNC: 0.82 MG/DL — SIGNIFICANT CHANGE UP (ref 0.5–1.3)
GLUCOSE SERPL-MCNC: 91 MG/DL — SIGNIFICANT CHANGE UP (ref 70–99)
HCT VFR BLD CALC: 43.8 % — SIGNIFICANT CHANGE UP (ref 39–50)
HGB BLD-MCNC: 13.6 G/DL — SIGNIFICANT CHANGE UP (ref 13–17)
MCHC RBC-ENTMCNC: 24.3 PG — LOW (ref 27–34)
MCHC RBC-ENTMCNC: 31.1 GM/DL — LOW (ref 32–36)
MCV RBC AUTO: 78.4 FL — LOW (ref 80–100)
PLATELET # BLD AUTO: 103 K/UL — LOW (ref 150–400)
POTASSIUM SERPL-MCNC: 4 MMOL/L — SIGNIFICANT CHANGE UP (ref 3.5–5.3)
POTASSIUM SERPL-SCNC: 4 MMOL/L — SIGNIFICANT CHANGE UP (ref 3.5–5.3)
RBC # BLD: 5.59 M/UL — SIGNIFICANT CHANGE UP (ref 4.2–5.8)
RBC # FLD: 16.2 % — HIGH (ref 10.3–14.5)
SODIUM SERPL-SCNC: 136 MMOL/L — SIGNIFICANT CHANGE UP (ref 135–145)
WBC # BLD: 6.9 K/UL — SIGNIFICANT CHANGE UP (ref 3.8–10.5)
WBC # FLD AUTO: 6.9 K/UL — SIGNIFICANT CHANGE UP (ref 3.8–10.5)

## 2020-03-13 PROCEDURE — 93010 ELECTROCARDIOGRAM REPORT: CPT

## 2020-03-13 PROCEDURE — 99239 HOSP IP/OBS DSCHRG MGMT >30: CPT

## 2020-03-13 RX ORDER — LISINOPRIL 2.5 MG/1
1 TABLET ORAL
Qty: 60 | Refills: 0
Start: 2020-03-13 | End: 2020-05-11

## 2020-03-13 RX ORDER — METOPROLOL TARTRATE 50 MG
1 TABLET ORAL
Qty: 120 | Refills: 0
Start: 2020-03-13 | End: 2020-05-11

## 2020-03-13 RX ORDER — ASPIRIN/CALCIUM CARB/MAGNESIUM 324 MG
1 TABLET ORAL
Qty: 60 | Refills: 0
Start: 2020-03-13 | End: 2020-05-11

## 2020-03-13 RX ADMIN — Medication 81 MILLIGRAM(S): at 12:51

## 2020-03-13 RX ADMIN — SACUBITRIL AND VALSARTAN 1 TABLET(S): 24; 26 TABLET, FILM COATED ORAL at 06:15

## 2020-03-13 RX ADMIN — Medication 50 MILLIGRAM(S): at 06:15

## 2020-03-13 NOTE — PROGRESS NOTE ADULT - PROBLEM SELECTOR PROBLEM 2
ICD (implantable cardioverter-defibrillator) discharge
ICD (implantable cardioverter-defibrillator) discharge

## 2020-03-13 NOTE — PROGRESS NOTE ADULT - PROBLEM SELECTOR PLAN 1
I reviewed TTE images -- LV function has improved since 2014 with EF now 50-55%; continue medical therapy with metoprolol; Lisinopril was changed to Entresto yesterday -- would change back to ACE-I if Entresto unaffordable to patient.
-b-blocker restarted  - maintain K+>4.0, Mg++>2.0  - ICD software upgraded , Mode & VT zone adjustment made by [employer] rep.  - compliance with medications & f/u reinforced to pt  - EPS/ablation of SVT today, risks & benefits discussed with pt.    Plan d/w Dr. Mcguire/Dr. Root/hospitalist
LV function has improved since 2014 with EF now 50-55%; continue medical therapy with metoprolol; Lisinopril was changed to Entresto yesterday -- would change back to ACE-I if Entresto unaffordable to patient.  However must wait 2 days after entresto d/barb to restart lisinopril.

## 2020-03-13 NOTE — PROGRESS NOTE ADULT - REASON FOR ADMISSION
Chest pain/ palpitations
ICD shock
Chest pain/ palpitations
Chest pain/ palpitations

## 2020-03-13 NOTE — PROGRESS NOTE ADULT - SUBJECTIVE AND OBJECTIVE BOX
[facility]  Same-Day Surgery   Adult Discharge Orders & Instructions     For 24 hours after surgery    1. Get plenty of rest.  A responsible adult must stay with you for at least 24 hours after you leave the hospital.   2. Do not drive or use heavy equipment.  If you have weakness or tingling, don't drive or use heavy equipment until this feeling goes away.  3. Do not drink alcohol.  4. Avoid strenuous or risky activities.  Ask for help when climbing stairs.   5. You may feel lightheaded.  IF so, sit for a few minutes before standing.  Have someone help you get up.   6. If you have nausea (feel sick to your stomach): Drink only clear liquids such as apple juice, ginger ale, broth or 7-Up.  Rest may also help.  Be sure to drink enough fluids.  Move to a regular diet as you feel able.  7. You may have a slight fever. Call the doctor if your fever is over 100 F (37.7 C) (taken under the tongue) or lasts longer than 24 hours.  8. You may have a dry mouth, a sore throat, muscle aches or trouble sleeping.  These should go away after 24 hours.  9. Do not make important or legal decisions.   Call your doctor for any of the followin.  Signs of infection (fever, growing tenderness at the surgery site, a large amount of drainage or bleeding, severe pain, foul-smelling drainage, redness, swelling).    2. It has been over 8 to 10 hours since surgery and you are still not able to urinate (pass water).    3.  Headache for over 24 hours.    4.  Numbness, tingling or weakness the day after surgery (if you had spinal anesthesia).  To contact a doctor, call ___Dr. Ybarra's office @[phone]_________ or:    X   [phone] and ask for the resident on call for ___Thoracic Surgery______ (answered 24 hours a day)  X    Emergency Department: [facility]: [phone]       (TTY for hearing impaired: [phone])          
40 year old man with a history of non-ischemic cardiomyopathy with severe LV dysfunction ( EF = 15%) and lack of medical attention/pharmacotherapy x 1+ year admitted on 3/10/2020 after several ICD shocks.    3/12/2020:  Feels well; eager to return home; no SOB or angina.  3/12/20: no complaints overnight.      MEDICATIONS:    MEDICATIONS  (STANDING):  aspirin  chewable 81 milliGRAM(s) Oral daily  influenza   Vaccine 0.5 milliLiter(s) IntraMuscular once  lactated ringers. 1000 milliLiter(s) (100 mL/Hr) IV Continuous <Continuous>  metoprolol tartrate 50 milliGRAM(s) Oral two times a day  sacubitril 24 mG/valsartan 26 mG 1 Tablet(s) Oral two times a day    MEDICATIONS  (PRN):  acetaminophen   Tablet .. 650 milliGRAM(s) Oral every 4 hours PRN Temp greater or equal to 38C (100.4F), Mild Pain (1 - 3)  fentaNYL    Injectable 25 MICROGram(s) IV Push every 5 minutes PRN Moderate Pain (4 - 6)  morphine  - Injectable 2 milliGRAM(s) IV Push every 4 hours PRN Moderate Pain (4 - 6)  ondansetron Injectable 4 milliGRAM(s) IV Push every 6 hours PRN Nausea  ondansetron Injectable 4 milliGRAM(s) IV Push once PRN Nausea and/or Vomiting      Vital Signs Last 24 Hrs  T(C): 36.7 (13 Mar 2020 10:56), Max: 36.8 (12 Mar 2020 18:50)  T(F): 98 (13 Mar 2020 10:56), Max: 98.2 (12 Mar 2020 18:50)  HR: 77 (13 Mar 2020 10:56) (66 - 77)  BP: 120/78 (13 Mar 2020 10:56) (102/76 - 120/78)  BP(mean): --  RR: 18 (13 Mar 2020 10:56) (16 - 18)  SpO2: 97% (13 Mar 2020 10:56) (97% - 100%)Daily     Daily Weight in k.8 (13 Mar 2020 06:06)I&O's Summary    PHYSICAL EXAM:  Constitutional: NAD, awake and alert  Respiratory: Breath sounds are clear bilaterally, Non-labored, no crackles  Cardiovascular: S1 and S2, regular rate and rhythm  Gastrointestinal: Bowel Sounds present, soft, nontender.   Extremities: No peripheral edema.  Skin: Warm, dry    LABS: All Labs Reviewed:                        13.6   6.90  )-----------( 103      ( 13 Mar 2020 08:07 )             43.8                         14.0   6.89  )-----------( 130      ( 12 Mar 2020 08:08 )             44.5                         12.9   5.38  )-----------( 132      ( 11 Mar 2020 07:46 )             43.8     13 Mar 2020 08:07    136    |  105    |  9      ----------------------------<  91     4.0     |  26     |  0.82   12 Mar 2020 08:08    138    |  104    |  11     ----------------------------<  113    3.6     |  27     |  0.98   11 Mar 2020 07:46    136    |  105    |  12     ----------------------------<  100    3.8     |  26     |  0.94     Ca    8.8        13 Mar 2020 08:07  Ca    8.8        12 Mar 2020 08:08  Ca    8.9        11 Mar 2020 07:46  Phos  3.5       11 Mar 2020 07:46  Mg     2.3       11 Mar 2020 07:46    TPro  7.9    /  Alb  3.5    /  TBili  0.8    /  DBili  x      /  AST  77     /  ALT  60     /  AlkPhos  105    11 Mar 2020 07:46
Chief Complaint: chest pain/palpitations     Subjective and objective     40 year old male patient with pertinent history of ischemic cardiomyopathy with AICD( Last known EF 15% in 2014) presented to the ED complaining of chest pain, dizziness and palpitations for one day. Symptoms preceded by 4 shocks being delivered by AICD. Patient last experienced firing of AICD 2 years ago.  Endorsed some localized discomfort to the site of AICD. Patient states he has not seen a Doctor in over a year and has been off of his medications for the same time period due to financial constraints.  Patient denies any fever, chills, sob, leg swelling, nausea or vomiting.    REVIEW OF SYSTEMS:    CONSTITUTIONAL: No weakness, fevers or chills  EYES/ENT: No visual changes;  No vertigo or throat pain   NECK: No pain or stiffness  RESPIRATORY: No cough, wheezing, hemoptysis; No shortness of breath  CARDIOVASCULAR: No chest pain or palpitations  GASTROINTESTINAL: No abdominal or epigastric pain. No nausea, vomiting, or hematemesis; No diarrhea or constipation. No melena or hematochezia.  GENITOURINARY: No dysuria, frequency or hematuria  NEUROLOGICAL: No numbness or weakness  SKIN: No itching, burning, rashes, or lesions   All other review of systems is negative unless indicated above      Vital Signs Last 24 Hrs  T(C): 36.7 (12 Mar 2020 11:20), Max: 36.9 (11 Mar 2020 21:32)  T(F): 98.1 (12 Mar 2020 11:20), Max: 98.4 (11 Mar 2020 21:32)  HR: 65 (12 Mar 2020 11:20) (59 - 66)  BP: 109/73 (12 Mar 2020 11:20) (109/73 - 132/81)  BP(mean): --  RR: 18 (12 Mar 2020 11:20) (18 - 18)  SpO2: 100% (12 Mar 2020 11:20) (100% - 100%)    I&O's Summary    11 Mar 2020 07:01  -  12 Mar 2020 07:00  --------------------------------------------------------  IN: 0 mL / OUT: 250 mL / NET: -250 mL        CAPILLARY BLOOD GLUCOSE          PHYSICAL EXAM:    Constitutional: NAD, awake and alert, well-developed  HEENT: PERR, EOMI, Normal Hearing, MMM  Neck: Soft and supple, No LAD, No JVD  Respiratory: Breath sounds are clear bilaterally, No wheezing, rales or rhonchi  Cardiovascular: S1 and S2, regular rate and rhythm, no Murmurs, gallops or rubs  Gastrointestinal: Bowel Sounds present, soft, nontender, nondistended, no guarding, no rebound  Extremities: No peripheral edema  Vascular: 2+ peripheral pulses  Neurological: A/O x 3, no focal deficits  Musculoskeletal: 5/5 strength b/l upper and lower extremities  Skin: No rashes    Medications:  MEDICATIONS  (STANDING):  aspirin  chewable 81 milliGRAM(s) Oral daily  influenza   Vaccine 0.5 milliLiter(s) IntraMuscular once  metoprolol tartrate 25 milliGRAM(s) Oral two times a day  sacubitril 24 mG/valsartan 26 mG 1 Tablet(s) Oral two times a day      Labs: All Labs Reviewed:                        14.0   6.89  )-----------( 130      ( 12 Mar 2020 08:08 )             44.5     03-12    138  |  104  |  11  ----------------------------<  113<H>  3.6   |  27  |  0.98    Ca    8.8      12 Mar 2020 08:08  Phos  3.5     03-11  Mg     2.3     03-11    TPro  7.9  /  Alb  3.5  /  TBili  0.8  /  DBili  x   /  AST  77<H>  /  ALT  60  /  AlkPhos  105  03-11    PT/INR - ( 11 Mar 2020 07:46 )   PT: 13.1 sec;   INR: 1.17 ratio         PTT - ( 10 Mar 2020 15:16 )  PTT:29.3 sec      CARDIAC MARKERS ( 11 Mar 2020 00:43 )  0.072 ng/mL / x     / x     / x     / x      CARDIAC MARKERS ( 10 Mar 2020 21:57 )  0.098 ng/mL / x     / x     / x     / x      CARDIAC MARKERS ( 10 Mar 2020 18:21 )  0.106 ng/mL / x     / x     / x     / x      CARDIAC MARKERS ( 10 Mar 2020 15:16 )  <0.015 ng/mL / x     / x     / x     / x          Blood Culture:   Urine Culture:       RADIOLOGY/EKG:  < from: Xray Chest 1 View-PORTABLE IMMEDIATE (03.10.20 @ 15:35) >    INTERPRETATION:  History: Chest pain    Portable radiograph of the chest is performed. comparison is made to 10/30/2014.    The heart is not enlarged.Left-sided ICD is again seen. The lungs are clear. Osseous structures are unremarkable    Impression: AICD. No active disease.    < end of copied text >      DVT PPX: ambulation
Chief Complaint: chest pain/palpitations    Subjective and objective     40 year old male patient with pertinent history of ischemic cardiomyopathy with AICD( Last known EF 15% in 2014) presented to the ED complaining of chest pain, dizziness and palpitations for one day. Symptoms preceded by 4 shocks being delivered by AICD. Patient last experienced firing of AICD 2 years ago.  Endorsed some localized discomfort to the site of AICD. Patient states he has not seen a Doctor in over a year and has been off of his medications for the same time period due to financial constraints.  Patient denies any fever, chills, sob, leg swelling, nausea or vomiting.    REVIEW OF SYSTEMS:    CONSTITUTIONAL: No weakness, fevers or chills  EYES/ENT: No visual changes;  No vertigo or throat pain   NECK: No pain or stiffness  RESPIRATORY: No cough, wheezing, hemoptysis; No shortness of breath  CARDIOVASCULAR: No chest pain or palpitations  GASTROINTESTINAL: No abdominal or epigastric pain. No nausea, vomiting, or hematemesis; No diarrhea or constipation. No melena or hematochezia.  GENITOURINARY: No dysuria, frequency or hematuria  NEUROLOGICAL: No numbness or weakness  SKIN: No itching, burning, rashes, or lesions   All other review of systems is negative unless indicated above    Vital Signs Last 24 Hrs  T(C): 37.1 (11 Mar 2020 11:09), Max: 37.1 (11 Mar 2020 11:09)  T(F): 98.8 (11 Mar 2020 11:09), Max: 98.8 (11 Mar 2020 11:09)  HR: 63 (11 Mar 2020 11:09) (63 - 96)  BP: 116/70 (11 Mar 2020 11:09) (116/70 - 136/87)  BP(mean): 97 (10 Mar 2020 15:39) (97 - 97)  RR: 18 (11 Mar 2020 11:09) (17 - 18)  SpO2: 99% (11 Mar 2020 11:09) (97% - 100%)    I&O's Summary    11 Mar 2020 07:01  -  11 Mar 2020 13:21  --------------------------------------------------------  IN: 0 mL / OUT: 250 mL / NET: -250 mL        CAPILLARY BLOOD GLUCOSE          PHYSICAL EXAM:    Constitutional: NAD, awake and alert, well-developed  HEENT: PERR, EOMI, Normal Hearing, MMM  Neck: Soft and supple, No LAD, No JVD  Respiratory: Breath sounds are clear bilaterally, No wheezing, rales or rhonchi  Cardiovascular: S1 and S2, regular rate and rhythm, no Murmurs, gallops or rubs  Gastrointestinal: Bowel Sounds present, soft, nontender, nondistended, no guarding, no rebound  Extremities: No peripheral edema  Vascular: 2+ peripheral pulses  Neurological: A/O x 3, no focal deficits  Musculoskeletal: 5/5 strength b/l upper and lower extremities  Skin: No rashes    Medications:  MEDICATIONS  (STANDING):  aspirin  chewable 81 milliGRAM(s) Oral daily  influenza   Vaccine 0.5 milliLiter(s) IntraMuscular once  lisinopril 2.5 milliGRAM(s) Oral daily  metoprolol tartrate 25 milliGRAM(s) Oral two times a day      Labs: All Labs Reviewed:                        12.9   5.38  )-----------( 132      ( 11 Mar 2020 07:46 )             43.8     03-11    136  |  105  |  12  ----------------------------<  100<H>  3.8   |  26  |  0.94    Ca    8.9      11 Mar 2020 07:46  Phos  3.5     03-11  Mg     2.3     03-11    TPro  7.9  /  Alb  3.5  /  TBili  0.8  /  DBili  x   /  AST  77<H>  /  ALT  60  /  AlkPhos  105  03-11    PT/INR - ( 11 Mar 2020 07:46 )   PT: 13.1 sec;   INR: 1.17 ratio         PTT - ( 10 Mar 2020 15:16 )  PTT:29.3 sec      CARDIAC MARKERS ( 11 Mar 2020 00:43 )  0.072 ng/mL / x     / x     / x     / x      CARDIAC MARKERS ( 10 Mar 2020 21:57 )  0.098 ng/mL / x     / x     / x     / x      CARDIAC MARKERS ( 10 Mar 2020 18:21 )  0.106 ng/mL / x     / x     / x     / x      CARDIAC MARKERS ( 10 Mar 2020 15:16 )  <0.015 ng/mL / x     / x     / x     / x            RADIOLOGY/EKG:  < from: Xray Chest 1 View-PORTABLE IMMEDIATE (03.10.20 @ 15:35) >    INTERPRETATION:  History: Chest pain    Portable radiograph of the chest is performed. comparison is made to 10/30/2014.    The heart is not enlarged.Left-sided ICD is again seen. The lungs are clear. Osseous structures are unremarkable    Impression: AICD. No active disease.    < end of copied text >      DVT PPX: ambulation
EP Nurse Practitioner Progress note:   s/p EP Study POD #1-no ablation performed-inappropriate Sinus tach/ Baseline prolonged HV/ s/p CRT-D device  observed overnight on [id number] V-pacing/ self terminating NSVT occ runs    Patient feels well.  Denies chest pain, shortness of breath, dizziness or palpitations at this time.        EKG:NSR@70  TELE:NSR-70s V-pacing/ self terminating NSVT occ runs    GENERAL: appears well, OOB to chair  CV: RRR, S1 S2, no murmur, rub, clicks or gallop noted  RESP: LCTA bilaterally, no wheeze, no rhonchi or crackles noted  GI/: soft, NT/ND  NEURO: AOx4, no focal deficits  EXTREMITIES: no edema, clubbing or cyanosis noted  PROCEDURE SITE:  Bilateral  groin dressings removed, site CDI with no bleeding, no hematoma, area soft, non tender, positive pedal pulses bilaterally        T(C): 36.8 (03-13-20 @ 06:06), Max: 36.8 (03-12-20 @ 18:50)  HR: 69 (03-13-20 @ 06:06) (62 - 78)  BP: 119/93 (03-13-20 @ 06:06) (102/65 - 124/76)  RR: 18 (03-13-20 @ 06:06) (16 - 18)  SpO2: 100% (03-13-20 @ 06:06) (97% - 100%)  Wt(kg): --  CBC Full  -  ( 12 Mar 2020 08:08 )  WBC Count : 6.89 K/uL  RBC Count : 5.63 M/uL  Hemoglobin : 14.0 g/dL  Hematocrit : 44.5 %  Platelet Count - Automated : 130 K/uL  Mean Cell Volume : 79.0 fl  Mean Cell Hemoglobin : 24.9 pg  Mean Cell Hemoglobin Concentration : 31.5 gm/dL  Auto Neutrophil # : x  Auto Lymphocyte # : x  Auto Monocyte # : x  Auto Eosinophil # : x  Auto Basophil # : x  Auto Neutrophil % : x  Auto Lymphocyte % : x  Auto Monocyte % : x  Auto Eosinophil % : x  Auto Basophil % : x    03-12    138  |  104  |  11  ----------------------------<  113<H>  3.6   |  27  |  0.98    Ca    8.8      12 Mar 2020 08:08              MEDICATIONS  (STANDING):  aspirin  chewable 81 milliGRAM(s) Oral daily  influenza   Vaccine 0.5 milliLiter(s) IntraMuscular once  lactated ringers. 1000 milliLiter(s) (100 mL/Hr) IV Continuous <Continuous>  metoprolol tartrate 50 milliGRAM(s) Oral two times a day  sacubitril 24 mG/valsartan 26 mG 1 Tablet(s) Oral two times a day    MEDICATIONS  (PRN):  acetaminophen   Tablet .. 650 milliGRAM(s) Oral every 4 hours PRN Temp greater or equal to 38C (100.4F), Mild Pain (1 - 3)  fentaNYL    Injectable 25 MICROGram(s) IV Push every 5 minutes PRN Moderate Pain (4 - 6)  morphine  - Injectable 2 milliGRAM(s) IV Push every 4 hours PRN Moderate Pain (4 - 6)  ondansetron Injectable 4 milliGRAM(s) IV Push every 6 hours PRN Nausea  ondansetron Injectable 4 milliGRAM(s) IV Push once PRN Nausea and/or Vomiting        HPI:  40 year old male patient with pertinent history of ischemic cardiomyopathy with AICD( Last known EF 15% in 2014) presented to the ED complaining of chest pain, dizziness and palpitations for one day. Symptoms preceded by 4 shocks being delivered by AICD. Patient last experienced firing of AICD 2 years ago.  Endorsed some localized discomfort to the site of AICD. Patient states he has not seen a Doctor in over a year and has been off of his medications for the same time period due to financial constraints.  Patient denies any fever, chills, sob, leg swelling, nausea or vomiting. (10 Mar 2020 19:26)    now s/p EP study-Inappropriate ST     ASSESSMENT/PLAN:    -Increased metoprolol to 50 mg po BID  -Continue optimal medical therapy/ management of HF  -Encourage aerobic exercise  -Continue remote monitoring  Keep K greater than 4.0, Mg greater than 2.0  -Plan of care discussed with patient,  -labs, EKG and procedure site assessed  -Follow-up with PCP and Dr Mcguire  within 2-3 weeks
Pt is resting in the bed, NAD. No event overnight.    Tele: SR 60-70's bpm, A-sensed V-paced, few beats of NSVT    ROS:  General: Pt denies recent weight loss/fever/chills    Neurological: denies numbness or  sensation loss    Cardiovascular: denies chest pain/palpitations/leg edema    Respiratory and Thorax: denies SOB/wheezing (+)non productive cough    Gastrointestinal: denies abdominal pain/diarrhea/constipation/bloody stool    Genitourinary: denies urinary frequency/urgency/ dysuria    Musculoskeletal: denies joint pain or swelling, denies restricted motion    Hematologic: denies abnormal bleeding  	    Physical Exam:  Vital Signs Last 24 Hrs  T(C): 36.7 (12 Mar 2020 11:20), Max: 36.9 (11 Mar 2020 21:32)  T(F): 98.1 (12 Mar 2020 11:20), Max: 98.4 (11 Mar 2020 21:32)  HR: 65 (12 Mar 2020 11:20) (59 - 66)  BP: 109/73 (12 Mar 2020 11:20) (109/73 - 132/81)  RR: 18 (12 Mar 2020 11:20) (18 - 18)  SpO2: 100% (12 Mar 2020 11:20) (100% - 100%)     Constitutional: well developed, well nourished, no deformities and no acute distress    Neurological: Alert & Oriented x 3, ROJAS, no focal deficits    HEENT: NC/AT, PERRLA, EOMI,  Neck supple.    Respiratory: CTA B/L, No wheezing/crackles/rhonchi    Cardiovascular: (+) S1 & S2, RRR, No m/r/g    Gastrointestinal: soft, NT, nondistended, (+) BS    Genitourinary: non distended bladder, voiding freely    Extremities: No pedal edema, No clubbing, No cyanosis    Skin:  normal skin color and pigmentation, no skin lesions    Allergies    No Known Allergies      MEDICATIONS  (STANDING):  aspirin  chewable 81 milliGRAM(s) Oral daily  influenza   Vaccine 0.5 milliLiter(s) IntraMuscular once  metoprolol tartrate 25 milliGRAM(s) Oral two times a day  sacubitril 24 mG/valsartan 26 mG 1 Tablet(s) Oral two times a day    MEDICATIONS  (PRN):  acetaminophen   Tablet .. 650 milliGRAM(s) Oral every 4 hours PRN Temp greater or equal to 38C (100.4F), Mild Pain (1 - 3)  morphine  - Injectable 2 milliGRAM(s) IV Push every 4 hours PRN Moderate Pain (4 - 6)  ondansetron Injectable 4 milliGRAM(s) IV Push every 6 hours PRN Nausea    LABS:                        14.0   6.89  )-----------( 130      ( 12 Mar 2020 08:08 )             44.5     03-12    138  |  104  |  11  ----------------------------<  113<H>  3.6   |  27  |  0.98    Ca    8.8      12 Mar 2020 08:08  Phos  3.5     03-11  Mg     2.3     03-11    TPro  7.9  /  Alb  3.5  /  TBili  0.8  /  DBili  x   /  AST  77<H>  /  ALT  60  /  AlkPhos  105  03-11    PT/INR - ( 11 Mar 2020 07:46 )   PT: 13.1 sec;   INR: 1.17 ratio         PTT - ( 10 Mar 2020 15:16 )  PTT:29.3 sec
REASON FOR VISIT: CHF, ICD shocks    HPI:  40 year old man with a history of non-ischemic cardiomyopathy with severe LV dysfunction (2014 EF = 15%) and lack of medical attention/pharmacotherapy x 1+ year admitted on 3/10/2020 after several ICD shocks.    3/12/2020:  Feels well; eager to return home; no SOB or angina.    MEDICATIONS  (STANDING):  aspirin  chewable 81 milliGRAM(s) Oral daily  influenza   Vaccine 0.5 milliLiter(s) IntraMuscular once  metoprolol tartrate 25 milliGRAM(s) Oral two times a day  sacubitril 24 mG/valsartan 26 mG 1 Tablet(s) Oral two times a day    Vital Signs Last 24 Hrs  T(C): 36.7 (12 Mar 2020 05:51), Max: 37.1 (11 Mar 2020 11:09)  T(F): 98.1 (12 Mar 2020 05:51), Max: 98.8 (11 Mar 2020 11:09)  HR: 62 (12 Mar 2020 05:51) (59 - 66)  BP: 132/81 (12 Mar 2020 05:51) (116/70 - 132/81)  RR: 18 (11 Mar 2020 21:32) (18 - 18)  SpO2: 100% (12 Mar 2020 05:51) (99% - 100%)    PHYSICAL EXAM:  Constitutional: NAD, awake and alert  Respiratory: Breath sounds are clear bilaterally, Non-labored, no crackles  Cardiovascular: S1 and S2, regular rate and rhythm  Gastrointestinal: Bowel Sounds present, soft, nontender.   Extremities: No peripheral edema.  Skin: Warm, dry    LABS:   CARDIAC MARKERS ( 11 Mar 2020 00:43 ) 0.072 ng/mL / x     / x     / x     / x      CARDIAC MARKERS ( 10 Mar 2020 21:57 ) 0.098 ng/mL / x     / x     / x     / x      CARDIAC MARKERS ( 10 Mar 2020 18:21 ) 0.106 ng/mL / x     / x     / x     / x      CARDIAC MARKERS ( 10 Mar 2020 15:16 ) <0.015 ng/mL / x     / x     / x     / x                         12.9   5.38  )-----------( 132      ( 11 Mar 2020 07:46 )             43.8     136  |  105  |  12  ----------------------------<  100<H>  3.8   |  26  |  0.94    12 Lead ECG (03.11.20 @ 07:44):  Atrial-sensed ventricular-paced rhythm

## 2020-03-13 NOTE — DISCHARGE NOTE NURSING/CASE MANAGEMENT/SOCIAL WORK - PATIENT PORTAL LINK FT
You can access the FollowMyHealth Patient Portal offered by [facility] by registering at the following website: [email or web address]. By joining [employer]’s FollowMyHealth portal, you will also be able to view your health information using other applications (apps) compatible with our system.

## 2020-03-13 NOTE — DISCHARGE NOTE PROVIDER - CARE PROVIDER_API CALL
Kostas Mcguire)  Cardiology; Internal Medicine  [address] [place] [address]  Phone: [phone]  Fax: [phone]  Follow Up Time:

## 2020-03-13 NOTE — DISCHARGE NOTE PROVIDER - HOSPITAL COURSE
40 year old male patient with pertinent history of ischemic cardiomyopathy with AICD( Last known EF 15% in 2014) presented to the ED complaining of chest pain, dizziness and palpitations for one day. Symptoms preceded by 4 shocks being delivered by AICD. Patient last experienced firing of AICD 2 years ago.  Endorsed some localized discomfort to the site of AICD. Patient states he has not seen a Doctor in over a year and has been off of his medications for the same time period due to financial constraints. Patient was seen by cardiology and EP cardiology, PPM/AICD was interrogated; pt. was started on BB and ACEI, had an EP study - no ablation and is now stable    to be discharged home. Pt. will follow up outaptient with EP for PPM/AICD interrogation on 4/20 and will follow up with [facility] on 4/2/2020.

## 2020-03-13 NOTE — DISCHARGE NOTE PROVIDER - NSDCMRMEDTOKEN_GEN_ALL_CORE_FT
aspirin 81 mg oral tablet, chewable: 1 tab(s) orally once a day  lisinopril 10 mg oral tablet: 1 tab(s) orally once a day   Start on Monday 3/16/2020  metoprolol tartrate 50 mg oral tablet: 1 tab(s) orally 2 times a day

## 2020-03-13 NOTE — PROGRESS NOTE ADULT - PROBLEM SELECTOR PLAN 2
Await EP f/u regarding device function; no arrhthymias overnight.
ICD interrogation revealed episodes of SVT in 180s resulting in inappropriate ICD shocks x 4 for SVT.  Mode & VT zone adjustment made.  EPS yesterday for SVT ablation performed inappropriate ST induced, metoprolol increased.  W/ higher VT zone & uptitration of metoprolol ICD shocks should be minimized.

## 2020-03-16 RX ORDER — LISINOPRIL 2.5 MG/1
1 TABLET ORAL
Qty: 60 | Refills: 0
Start: 2020-03-16 | End: 2020-05-14

## 2020-03-20 DIAGNOSIS — Z79.82 LONG TERM (CURRENT) USE OF ASPIRIN: ICD-10-CM

## 2020-03-20 DIAGNOSIS — I42.8 OTHER CARDIOMYOPATHIES: ICD-10-CM

## 2020-03-20 DIAGNOSIS — Z95.810 PRESENCE OF AUTOMATIC (IMPLANTABLE) CARDIAC DEFIBRILLATOR: ICD-10-CM

## 2020-03-20 DIAGNOSIS — I10 ESSENTIAL (PRIMARY) HYPERTENSION: ICD-10-CM

## 2020-03-20 DIAGNOSIS — I42.0 DILATED CARDIOMYOPATHY: ICD-10-CM

## 2020-03-20 DIAGNOSIS — I47.1 SUPRAVENTRICULAR TACHYCARDIA: ICD-10-CM

## 2020-03-20 DIAGNOSIS — I25.10 ATHEROSCLEROTIC HEART DISEASE OF NATIVE CORONARY ARTERY WITHOUT ANGINA PECTORIS: ICD-10-CM

## 2020-03-20 DIAGNOSIS — I44.7 LEFT BUNDLE-BRANCH BLOCK, UNSPECIFIED: ICD-10-CM

## 2020-04-20 ENCOUNTER — APPOINTMENT (OUTPATIENT)
Dept: ELECTROPHYSIOLOGY | Facility: CLINIC | Age: 40
End: 2020-04-20
Payer: COMMERCIAL

## 2020-04-20 PROCEDURE — 99215 OFFICE O/P EST HI 40 MIN: CPT | Mod: 95

## 2020-04-20 NOTE — ASSESSMENT
[FreeTextEntry1] : 40-year-old male with history of ischemic cardiomyopathy and prior low LVEF to 15% who underwent CRT-D implant in 2014 ([employer]) patient was admitted to hospital on 3/2020 after ICD shock interrogation showed inappropriate ICD shock x4 for Inappropriate sinus tachcyardia at 150 bpm, confirmed by EP study subsequently.  ICD tachycardia therapy zones were reprogrammed VT zone at 200 bpm and VF at 240 bpm and monitored VT zone at 150 bpm.\par Inappropriate sinus tachycardia uptitrate metoprolol as tolerated, currently on 50mg bid\par Control HTN\par encouraged aerobic exercise and weight loss \par activate remote monitoring and routine device check q6 months

## 2020-04-20 NOTE — HISTORY OF PRESENT ILLNESS
[Home] : at home, [unfilled] , at the time of the visit. [Patient] : the patient [FreeTextEntry1] : 40-year-old male with history of ischemic cardiomyopathy and prior low LVEF to 15% who underwent CRT-D implant in 2014 ([employer]) patient was admitted to hospital on 3/2020 after ICD shock interrogation showed inappropriate ICD shock x4 for questionable SVT/AT at 150 bpm.  ICD tachycardia therapy zones were reprogrammed VT zone at 200 bpm and VF at 240 bpm and monitored VT zone at 150 bpm.\par Patient underwent electrophysiology study on 3/12/2020 there was no inducible atrial tachycardia however there was inappropriate sinus tachycardia up to 150 bpm consistent with clinical inappropriate sinus tachycardia.\par TTE is 3/2020 shows improved LV function LVEF 50% mild enlargement of LA.

## 2020-04-20 NOTE — PHYSICAL EXAM
[General Appearance - Well Developed] : well developed [Normal Appearance] : normal appearance [Well Groomed] : well groomed [Normal Conjunctiva] : the conjunctiva exhibited no abnormalities [Normal Oral Mucosa] : normal oral mucosa

## 2021-03-05 ENCOUNTER — EMERGENCY (EMERGENCY)
Facility: HOSPITAL | Age: 41
LOS: 0 days | Discharge: HOME | End: 2021-03-05
Attending: EMERGENCY MEDICINE
Payer: COMMERCIAL

## 2021-03-05 VITALS
HEART RATE: 84 BPM | RESPIRATION RATE: 14 BRPM | DIASTOLIC BLOOD PRESSURE: 82 MMHG | SYSTOLIC BLOOD PRESSURE: 116 MMHG | OXYGEN SATURATION: 100 % | TEMPERATURE: 98 F

## 2021-03-05 VITALS
SYSTOLIC BLOOD PRESSURE: 119 MMHG | WEIGHT: 169.98 LBS | DIASTOLIC BLOOD PRESSURE: 91 MMHG | TEMPERATURE: 98 F | RESPIRATION RATE: 18 BRPM | OXYGEN SATURATION: 99 % | HEART RATE: 122 BPM | HEIGHT: 67 IN

## 2021-03-05 DIAGNOSIS — Z82.49 FAMILY HISTORY OF ISCHEMIC HEART DISEASE AND OTHER DISEASES OF THE CIRCULATORY SYSTEM: ICD-10-CM

## 2021-03-05 DIAGNOSIS — E78.5 HYPERLIPIDEMIA, UNSPECIFIED: ICD-10-CM

## 2021-03-05 DIAGNOSIS — F10.20 ALCOHOL DEPENDENCE, UNCOMPLICATED: ICD-10-CM

## 2021-03-05 DIAGNOSIS — R07.9 CHEST PAIN, UNSPECIFIED: ICD-10-CM

## 2021-03-05 DIAGNOSIS — R06.02 SHORTNESS OF BREATH: ICD-10-CM

## 2021-03-05 DIAGNOSIS — R00.2 PALPITATIONS: ICD-10-CM

## 2021-03-05 DIAGNOSIS — E46 UNSPECIFIED PROTEIN-CALORIE MALNUTRITION: ICD-10-CM

## 2021-03-05 DIAGNOSIS — F19.94 OTHER PSYCHOACTIVE SUBSTANCE USE, UNSPECIFIED WITH PSYCHOACTIVE SUBSTANCE-INDUCED MOOD DISORDER: ICD-10-CM

## 2021-03-05 DIAGNOSIS — Z79.82 LONG TERM (CURRENT) USE OF ASPIRIN: ICD-10-CM

## 2021-03-05 DIAGNOSIS — F14.10 COCAINE ABUSE, UNCOMPLICATED: ICD-10-CM

## 2021-03-05 DIAGNOSIS — I10 ESSENTIAL (PRIMARY) HYPERTENSION: ICD-10-CM

## 2021-03-05 DIAGNOSIS — F32.9 MAJOR DEPRESSIVE DISORDER, SINGLE EPISODE, UNSPECIFIED: ICD-10-CM

## 2021-03-05 DIAGNOSIS — R42 DIZZINESS AND GIDDINESS: ICD-10-CM

## 2021-03-05 DIAGNOSIS — Z95.810 PRESENCE OF AUTOMATIC (IMPLANTABLE) CARDIAC DEFIBRILLATOR: ICD-10-CM

## 2021-03-05 DIAGNOSIS — R07.89 OTHER CHEST PAIN: ICD-10-CM

## 2021-03-05 LAB
ADD ON TEST-SPECIMEN IN LAB: SIGNIFICANT CHANGE UP
ALBUMIN SERPL ELPH-MCNC: 4.1 G/DL — SIGNIFICANT CHANGE UP (ref 3.3–5)
ALP SERPL-CCNC: 92 U/L — SIGNIFICANT CHANGE UP (ref 40–120)
ALT FLD-CCNC: 66 U/L — SIGNIFICANT CHANGE UP (ref 12–78)
ANION GAP SERPL CALC-SCNC: 11 MMOL/L — SIGNIFICANT CHANGE UP (ref 5–17)
APPEARANCE UR: CLEAR — SIGNIFICANT CHANGE UP
APTT BLD: 34 SEC — SIGNIFICANT CHANGE UP (ref 27.5–35.5)
AST SERPL-CCNC: 143 U/L — HIGH (ref 15–37)
BASOPHILS # BLD AUTO: 0.04 K/UL — SIGNIFICANT CHANGE UP (ref 0–0.2)
BASOPHILS NFR BLD AUTO: 0.4 % — SIGNIFICANT CHANGE UP (ref 0–2)
BILIRUB SERPL-MCNC: 1.7 MG/DL — HIGH (ref 0.2–1.2)
BILIRUB UR-MCNC: ABNORMAL
BUN SERPL-MCNC: 13 MG/DL — SIGNIFICANT CHANGE UP (ref 7–23)
CALCIUM SERPL-MCNC: 8.9 MG/DL — SIGNIFICANT CHANGE UP (ref 8.5–10.1)
CHLORIDE SERPL-SCNC: 101 MMOL/L — SIGNIFICANT CHANGE UP (ref 96–108)
CO2 SERPL-SCNC: 23 MMOL/L — SIGNIFICANT CHANGE UP (ref 22–31)
COLOR SPEC: ABNORMAL
CREAT SERPL-MCNC: 1.09 MG/DL — SIGNIFICANT CHANGE UP (ref 0.5–1.3)
DIFF PNL FLD: NEGATIVE — SIGNIFICANT CHANGE UP
EOSINOPHIL # BLD AUTO: 0.04 K/UL — SIGNIFICANT CHANGE UP (ref 0–0.5)
EOSINOPHIL NFR BLD AUTO: 0.4 % — SIGNIFICANT CHANGE UP (ref 0–6)
GLUCOSE SERPL-MCNC: 135 MG/DL — HIGH (ref 70–99)
GLUCOSE UR QL: NEGATIVE MG/DL — SIGNIFICANT CHANGE UP
HCT VFR BLD CALC: 37.3 % — LOW (ref 39–50)
HGB BLD-MCNC: 11.7 G/DL — LOW (ref 13–17)
IMM GRANULOCYTES NFR BLD AUTO: 0.3 % — SIGNIFICANT CHANGE UP (ref 0–1.5)
INR BLD: 1.23 RATIO — HIGH (ref 0.88–1.16)
KETONES UR-MCNC: ABNORMAL
LEUKOCYTE ESTERASE UR-ACNC: ABNORMAL
LYMPHOCYTES # BLD AUTO: 2.46 K/UL — SIGNIFICANT CHANGE UP (ref 1–3.3)
LYMPHOCYTES # BLD AUTO: 22 % — SIGNIFICANT CHANGE UP (ref 13–44)
MCHC RBC-ENTMCNC: 23.6 PG — LOW (ref 27–34)
MCHC RBC-ENTMCNC: 31.4 GM/DL — LOW (ref 32–36)
MCV RBC AUTO: 75.4 FL — LOW (ref 80–100)
MONOCYTES # BLD AUTO: 1.08 K/UL — HIGH (ref 0–0.9)
MONOCYTES NFR BLD AUTO: 9.7 % — SIGNIFICANT CHANGE UP (ref 2–14)
NEUTROPHILS # BLD AUTO: 7.53 K/UL — HIGH (ref 1.8–7.4)
NEUTROPHILS NFR BLD AUTO: 67.2 % — SIGNIFICANT CHANGE UP (ref 43–77)
NITRITE UR-MCNC: NEGATIVE — SIGNIFICANT CHANGE UP
PCP SPEC-MCNC: SIGNIFICANT CHANGE UP
PH UR: 6 — SIGNIFICANT CHANGE UP (ref 5–8)
PLATELET # BLD AUTO: 104 K/UL — LOW (ref 150–400)
POTASSIUM SERPL-MCNC: 3.6 MMOL/L — SIGNIFICANT CHANGE UP (ref 3.5–5.3)
POTASSIUM SERPL-SCNC: 3.6 MMOL/L — SIGNIFICANT CHANGE UP (ref 3.5–5.3)
PROT SERPL-MCNC: 8.7 GM/DL — HIGH (ref 6–8.3)
PROT UR-MCNC: 30 MG/DL
PROTHROM AB SERPL-ACNC: 14.3 SEC — HIGH (ref 10.6–13.6)
RBC # BLD: 4.95 M/UL — SIGNIFICANT CHANGE UP (ref 4.2–5.8)
RBC # FLD: 19.8 % — HIGH (ref 10.3–14.5)
SARS-COV-2 RNA SPEC QL NAA+PROBE: SIGNIFICANT CHANGE UP
SODIUM SERPL-SCNC: 135 MMOL/L — SIGNIFICANT CHANGE UP (ref 135–145)
SP GR SPEC: 1.01 — SIGNIFICANT CHANGE UP (ref 1.01–1.02)
TROPONIN I SERPL-MCNC: <0.015 NG/ML — SIGNIFICANT CHANGE UP (ref 0.01–0.04)
TROPONIN I SERPL-MCNC: <0.015 NG/ML — SIGNIFICANT CHANGE UP (ref 0.01–0.04)
UROBILINOGEN FLD QL: 1 MG/DL
WBC # BLD: 11.18 K/UL — HIGH (ref 3.8–10.5)
WBC # FLD AUTO: 11.18 K/UL — HIGH (ref 3.8–10.5)

## 2021-03-05 PROCEDURE — 71045 X-RAY EXAM CHEST 1 VIEW: CPT | Mod: 26

## 2021-03-05 PROCEDURE — 93289 INTERROG DEVICE EVAL HEART: CPT

## 2021-03-05 PROCEDURE — 80307 DRUG TEST PRSMV CHEM ANLYZR: CPT

## 2021-03-05 PROCEDURE — 93010 ELECTROCARDIOGRAM REPORT: CPT

## 2021-03-05 PROCEDURE — 99223 1ST HOSP IP/OBS HIGH 75: CPT

## 2021-03-05 PROCEDURE — 80053 COMPREHEN METABOLIC PANEL: CPT

## 2021-03-05 PROCEDURE — 85730 THROMBOPLASTIN TIME PARTIAL: CPT

## 2021-03-05 PROCEDURE — 90792 PSYCH DIAG EVAL W/MED SRVCS: CPT | Mod: 95

## 2021-03-05 PROCEDURE — 36415 COLL VENOUS BLD VENIPUNCTURE: CPT

## 2021-03-05 PROCEDURE — 85025 COMPLETE CBC W/AUTO DIFF WBC: CPT

## 2021-03-05 PROCEDURE — 84484 ASSAY OF TROPONIN QUANT: CPT

## 2021-03-05 PROCEDURE — 85610 PROTHROMBIN TIME: CPT

## 2021-03-05 PROCEDURE — 84443 ASSAY THYROID STIM HORMONE: CPT

## 2021-03-05 PROCEDURE — 99285 EMERGENCY DEPT VISIT HI MDM: CPT

## 2021-03-05 PROCEDURE — U0005: CPT

## 2021-03-05 PROCEDURE — U0003: CPT

## 2021-03-05 PROCEDURE — 93284 PRGRMG EVAL IMPLANTABLE DFB: CPT | Mod: 26

## 2021-03-05 PROCEDURE — 71045 X-RAY EXAM CHEST 1 VIEW: CPT

## 2021-03-05 PROCEDURE — 83735 ASSAY OF MAGNESIUM: CPT

## 2021-03-05 PROCEDURE — 93005 ELECTROCARDIOGRAM TRACING: CPT

## 2021-03-05 PROCEDURE — 81001 URINALYSIS AUTO W/SCOPE: CPT

## 2021-03-05 PROCEDURE — 99285 EMERGENCY DEPT VISIT HI MDM: CPT | Mod: 25

## 2021-03-05 PROCEDURE — 83880 ASSAY OF NATRIURETIC PEPTIDE: CPT

## 2021-03-05 PROCEDURE — 96360 HYDRATION IV INFUSION INIT: CPT | Mod: XU

## 2021-03-05 RX ORDER — SODIUM CHLORIDE 9 MG/ML
1000 INJECTION INTRAMUSCULAR; INTRAVENOUS; SUBCUTANEOUS ONCE
Refills: 0 | Status: COMPLETED | OUTPATIENT
Start: 2021-03-05 | End: 2021-03-05

## 2021-03-05 RX ORDER — ASPIRIN/CALCIUM CARB/MAGNESIUM 324 MG
243 TABLET ORAL ONCE
Refills: 0 | Status: COMPLETED | OUTPATIENT
Start: 2021-03-05 | End: 2021-03-05

## 2021-03-05 RX ADMIN — Medication 243 MILLIGRAM(S): at 13:35

## 2021-03-05 RX ADMIN — SODIUM CHLORIDE 1000 MILLILITER(S): 9 INJECTION INTRAMUSCULAR; INTRAVENOUS; SUBCUTANEOUS at 13:33

## 2021-03-05 RX ADMIN — SODIUM CHLORIDE 1000 MILLILITER(S): 9 INJECTION INTRAMUSCULAR; INTRAVENOUS; SUBCUTANEOUS at 14:33

## 2021-03-05 NOTE — CONSULT NOTE ADULT - ATTENDING COMMENTS
Irvin Dill M.D.  Cardiology, [facility], [facility]  Cell: [phone]  Office:   [phone] ([facility] Office)  [phone] ([facility] Office)

## 2021-03-05 NOTE — ED BEHAVIORAL HEALTH NOTE - BEHAVIORAL HEALTH NOTE
===================  PRE-HOSPITAL COURSE  ===================  SOURCE:  ADRIANA Lea/ED documentation   DETAILS:  Pt self presented to ED as walk-in c/o chest pain in the vicinity of his pacemaker     ============  ED COURSE   ============  SOURCE:  ADRIANA Lea/ED documentation   ARRIVAL:  walk-in, unaccompanied   BELONGINGS:  no belongings of note  BEHAVIOR: Pt. arrived to ED alert and oriented, appearing in fair hygiene.  Pt. eye contact good, speech clear and coherent, thoughts seemingly logical and linear in nature. Pt. had been medically evaluated and cleared for discharge.  Upon dc pt. endorsed SI and AH, stated that he has been feeling depressed and has experienced SI w/o plan; also stating that he is having thoughts of harming himself and "seeing things that are not there". Pt. denied HI and VH as well as additional psychiatric complaints. Pt's mood reportedly depressed with congruent affect observed in ED.  Pt. resting while awaiting evaluation on 1:1.   TREATMENT:  Pt. given Aspirin 243mg chewable PO; no additional meds given/no security intervention required  VISITORS:  no visitors present

## 2021-03-05 NOTE — ED PROVIDER NOTE - PROGRESS NOTE DETAILS
ADELAIDA paged, advised of the interrogation needed for the pt''s PPM. Will call someone to come in. -Chuy Gonzalez PA-C Adele Nichols for attending Dr. Johnson: 40 y/o male with a PMHx of HTN, HLD, and pacemaker presents to the ED c/o CP since the middle of last night. Radiates to back and L arm and worse w/ exertion. +lightheadedness. No hx of stents. Nonsmoker. No known sick contacts. No other complaints at this time. MDM: 40 y/o male hx of HTN, HLD, and pacemaker presents to the ED c/o CP that radiates to back and arm. Differential includes ACS, CP associated w/ COVID, atypical CP. Will obtain EKG, labs, interrogate pacemaker, and reassess closely. Adele Nichols for attending Dr. Johnson: discussed with cardio Dr. Dougherty. Will be down to see pt. Adele Nichols for attending Dr. Johnson: Discussed with cardio Dr. Dougherty. Will be down to see pt. Adele Nichols for attending Dr. Johnson: Pt signed out to oncoming ED doctor, Dr. Sanchez. Adele Nichols for attending Dr. Johnson: 42 y/o male with a PMHx of HTN, HLD, and pacemaker presents to the ED c/o CP since the middle of last night. Radiates to back and L arm and worse w/ exertion. +lightheadedness. No hx of stents. Last saw cardiology 1 year ago. Nonsmoker. No known sick contacts. No other complaints at this time. MDM: 42 y/o male hx of HTN, HLD, and pacemaker presents to the ED c/o CP that radiates to back and arm. Differential includes ACS, CP associated w/ COVID, atypical CP. Will obtain EKG, labs, interrogate pacemaker, and reassess closely. After interrogation of the PPM, no concern besides a battery life of 6 months. Pt was instructed to come back to have that replaced. Dr. Sanchez spoke with Dr. Morales and nothing needed at this time. Pt can be d/c if 2Ce negative. -Chuy Gonzalez PA-C 2CE brooks. Pt to be d/c home. -Chuy Gonzalez PA-C Upon d/c, pt states he has been feeling depressed over the last few months and having SI without a plan. WIll consult psych. -Chuy LANDRYC Spoke with telepsych. Will place pt on the list to be seen. -Chuy Gonzalez PA-C Spoke with telepsych attending Dr. Reis. States pt is cleared from a psychiatric view and will fax over papers for outpt psych and follow up. Pt also mentioned that his stomach was hurting but didn't eat anything. Palpated the abdomen and it's soft, nontender and labs were unremarkable. Will give GI f/u. Strict return precautions were given. -Chuy Gonzalez PA-C

## 2021-03-05 NOTE — ED ADULT NURSE NOTE - CAS ELECT INFOMATION PROVIDED
Patient agreed to all verbal and written discharge instructions. Patient agreed to follow up with PCP/PMD. Patient education preformed on signs/symptoms to return to the ED. Patient is alert, orientented, and ambulatory at time of discharge. Pt given follow up resources to GI / Cardio / ETOH / Drug counseling/DC instructions

## 2021-03-05 NOTE — ED BEHAVIORAL HEALTH ASSESSMENT NOTE - HPI (INCLUDE ILLNESS QUALITY, SEVERITY, DURATION, TIMING, CONTEXT, MODIFYING FACTORS, ASSOCIATED SIGNS AND SYMPTOMS)
40yo  M, domiciled with family around, unemployed for past 4 years, from [facility], PMH HTN, dilated nonischemic cardiomyopathy, pacemaker, PPH of etoh use (1 bottle a day of whiskey/vodka for years) and cocaine use, hx detox a couple of times, no hx of rehab, no hx of psych hospitalizations/SAs/self harm/violence who presented to the hospital for chest pain and palpitations, psych then consulted for depression and r/o SI.     used K[facility], ID# [id number].     On interview, patient states that he has been feeling depressed for a long time. States he does "nothing" with his day, is not eating right, is not taking his medications, and is drinking more alcohol than in the past. Endorses anhedonia and low energy and motivation, possibly due to etoh use per his report. Some difficulties with sleep. He endorses chronic intermittent hopelessness as he has not seen his 10 year old son for 2 years; he is with his mother. He denies any passive or active SI, more so would like to feel better. Patient does endorse auditory hallucinations, however, clarifies that these only occur during sleep (possibly hypnagogic/hypnapompic hallucinations) and he denies any command content. He denies any VH. Denies any paranoia. States that he has never seen a psychiatrist or therapist in the past. He denies any thoughts of harming others or HI.     We discuss plan to start outpatient care for substance and psych. Patient was in agreement.

## 2021-03-05 NOTE — ED ADULT TRIAGE NOTE - SOURCE OF INFORMATION
CC:   Chief Complaint   Patient presents with   • Cough     started 2 days ago   • Breathing Problem     retractions noted        SUBJECTIVE:    Ab Perez is a 25 month old male who presents to Immediate Care with the complaint of cough started 2 days ago but during a visit [facility] this morning the child started to cough and have increased difficulty of breathing.  They presented to the immediate care for evaluation.  Mother states that child has not had any high fevers the cough was sudden onset.  She states that she does not recall a child ever having history of any pulmonary disease.  Currently not taking meds    Review of Systems   Constitutional: Negative.    HENT: Positive for congestion.    Respiratory: Positive for cough and shortness of breath.    Cardiovascular: Negative.      Surgical history, Family history, Social history assessed as in chart.    There is no problem list on file for this patient.      No current outpatient medications on file.     No current facility-administered medications for this visit.      Allergies: ALLERGIES: no known allergies.    OBJECTIVE:    Vitals:    02/29/20 1112   Resp: 32   SpO2: 96%   Weight: 12.4 kg (27 lb 3.6 oz)       Physical Exam   Constitutional: He is well-developed, well-nourished, and in no distress.   Cardiovascular: Normal rate and regular rhythm.   Pulmonary/Chest:   Auscultation of lungs notes bilateral wheezing.  There is noted retractions and belly breathing on examination.   Nursing note and vitals reviewed.         ASSESSMENT/PLAN:    ED Diagnosis   1. Respiratory distress       Based on the patient's retractions and increased belly breathing I would recommend going the emergency room.  Respirations were noted to be 32 but child definitely having occult he with talking.      Diagnosis and prognosis, treatment and side-effects were explained and all questions were answered satisfactorily and there were no further questions upon  discharge.    Visit Vitals  Resp 32   Wt 12.4 kg (27 lb 3.6 oz)   SpO2 96%      Patient

## 2021-03-05 NOTE — ED PROVIDER NOTE - CARE PLAN
Principal Discharge DX:	Chest pain, unspecified type  Secondary Diagnosis:	Palpitations   Principal Discharge DX:	Chest pain, unspecified type  Secondary Diagnosis:	Palpitations  Secondary Diagnosis:	Depression, unspecified depression type

## 2021-03-05 NOTE — ED BEHAVIORAL HEALTH ASSESSMENT NOTE - SUMMARY
42yo  M, domiciled with family around, unemployed for past 4 years, from [facility], PMH HTN, dilated nonischemic cardiomyopathy, pacemaker, PPH of etoh use (1 bottle a day of whiskey/vodka for years) and cocaine use, hx detox a couple of times, no hx of rehab, no hx of psych hospitalizations/SAs/self harm/violence who presented to the hospital for chest pain and palpitations, psych then consulted for depression and r/o SI.    Patient presents with chronic substance use and symptoms of depression. Currently not in any outpatient care. States that he has never had a therapist or psychiatrist. Has never been to rehab as well. Currently patient is not suicidal and does not have a history of any suicide attemtpts. He does not present with any acute mood or psychotic symptoms. Does not meet criteria for involuntary psychiatric hospitalization at this time. Discussed with patient plan to provide referrals for substance use and psych in [place] and patient was in agreement. 40yo  M, domiciled with family around, unemployed for past 4 years, from [facility], PMH HTN, dilated nonischemic cardiomyopathy, pacemaker, PPH of etoh use (1 bottle a day of whiskey/vodka for years) and cocaine use, hx detox a couple of times, no hx of rehab, no hx of psych hospitalizations/SAs/self harm/violence who presented to the hospital for chest pain and palpitations, psych then consulted for depression and r/o SI.    Patient presents with chronic substance use and symptoms of depression. Currently not in any outpatient care. States that he has never had a therapist or psychiatrist. Has never been to rehab as well. Currently patient is not suicidal and does not have a history of any suicide attemtpts. He does not present with any acute mood or psychotic symptoms. Does not meet criteria for involuntary psychiatric hospitalization at this time. Discussed with patient plan to provide referrals for substance use and psych in [place] and patient was in agreement. Declined collateral contact.

## 2021-03-05 NOTE — ED PROVIDER NOTE - CLINICAL SUMMARY MEDICAL DECISION MAKING FREE TEXT BOX
42 yo male presents with cp, palpitations, myalgia, sob. Will call EP to have PPM interrogated, labs, ekg, cxr, covid, reeval. -Chuy Gonzalez PA-C

## 2021-03-05 NOTE — ED ADULT NURSE REASSESSMENT NOTE - NS ED NURSE REASSESS COMMENT FT1
Pt informed of results by HOSSEIN Gonzalez. Pt then stated to PA/RN  That he "is seeing things that are no there" and "I am having thoughts of harming myself now" Constant observation ordered and in place, safety maintained. Pt brought to behavioral health area of ED

## 2021-03-05 NOTE — ED PROVIDER NOTE - PATIENT PORTAL LINK FT
You can access the FollowMyHealth Patient Portal offered by [facility] by registering at the following website: [email or web address]. By joining [employer]’s FollowMyHealth portal, you will also be able to view your health information using other applications (apps) compatible with our system. You can access the FollowMyHealth Patient Portal offered by [facility] by registering at the following website: [email or web address]. By joining [employer]’s FollowMyHealth portal, you will also be able to view your health information using other applications (apps) compatible with our system.

## 2021-03-05 NOTE — CONSULT NOTE ADULT - PROBLEM SELECTOR RECOMMENDATION 9
atypical.  Trops neg.  ECG paced.  No further evaluation indicated.  needs to followup w/ gen cardiology (either [facility] or [facility]) & EP as OP.

## 2021-03-05 NOTE — ED PROVIDER NOTE - ATTENDING CONTRIBUTION TO CARE
I, Josselin Johnson MD, performed the initial face to face bedside interview with this patient regarding history of present illness, review of symptoms and relevant past medical, social and family history.  I completed an independent physical examination.  I was the initial provider who evaluated this patient. I have signed out the follow up of any pending tests (i.e. labs, radiological studies) to the ACP.  I have communicated the patient’s plan of care and disposition with the ACP.  The history, relevant review of systems, past medical and surgical history, medical decision making, and physical examination was documented by the scribe in my presence and I attest to the accuracy of the documentation.

## 2021-03-05 NOTE — ED PROVIDER NOTE - NS ED ROS FT
Constitutional: No fever or chills  Eyes: No visual changes  HEENT: No throat pain  CV:  +Chest pain, +lightheadedness  Resp: No SOB no cough  GI: No abd pain, nausea or vomiting  : No dysuria  MSK: No musculoskeletal pain  Skin: No rash  Neuro: No headache

## 2021-03-05 NOTE — ED BEHAVIORAL HEALTH ASSESSMENT NOTE - DETAILS
endorses hopelessness but no SI, would like to feel better discussed with patient to call 911 or come back to the emergency room should he have any escalating SI or have acute concern discussed states he had chest pain and palpitations, had cardiac workup done in ER

## 2021-03-05 NOTE — ED BEHAVIORAL HEALTH ASSESSMENT NOTE - RISK ASSESSMENT
Low Acute Suicide Risk RF: substance use, intermittent hopelessness  PF: help seeking, no SI, future oriented, no past psych history or history of SA

## 2021-03-05 NOTE — ED BEHAVIORAL HEALTH ASSESSMENT NOTE - SAFETY PLAN ADDT'L DETAILS
Education provided regarding environmental safety / lethal means restriction/Provision of National Suicide Prevention Lifeline 9-430-478-TALK (1004)

## 2021-03-05 NOTE — PROCEDURE NOTE - ADDITIONAL PROCEDURE DETAILS
Patient with BIV-ICD in place with normal function, adequate sensing and pacing thresholds.  Stored data reveals tachy-arrythmia therapy or stored events for review, AF burden <1%.  Patient has approximately 6-7 months until battery will be at [facility].  Reinforced with patient the importance of device follow up every three months. Patient has previously followed at [facility].  Made him aware he needs to schedule follow up for device check in three months. Patient with BIV-ICD in place with normal function, adequate sensing and pacing thresholds.  Stored data reveals tachy-arrythmia therapy or stored events for review, AF burden <1%.  Patient has approximately 6-7 months until battery will be at [facility].  Reinforced with patient the importance of device follow up every three months. Patient has previously followed at [facility] but it is unclear when he was last seen.  Will provide office contact information for follow up at [facility] if he is not following at [facility].

## 2021-03-05 NOTE — ED BEHAVIORAL HEALTH ASSESSMENT NOTE - DESCRIPTION
HTN, dilated nonischemic cardiomyopathy, pacemaker from [facility] As per  collateral note.     COVID screen:  1. *Have you had a COVID-19 test in the last 90 days? No  3. *Have you tested positive for COVID-19 antibodies? No  5. *Have you received 2 doses of the COVID-19 vaccine? No  7. *In the past 10 days, have you been around anyone with a positive COVID-19 test?* No  13. *Have you been out of [place] within the past 10 days?* No

## 2021-03-05 NOTE — ED ADULT NURSE NOTE - OBJECTIVE STATEMENT
Pt c/o chest pain starting this AM. Pt denies chest pain in ED. Pt states history pacemaker. Pt denies shortness of breath, fever, chest pain, dizziness, nausea, vomiting, diarrhea. Cardiac monitoring in progress, EKG done, safety maintained.

## 2021-03-05 NOTE — ED PROVIDER NOTE - PHYSICAL EXAMINATION
Constitutional: Middle aged male sitting in bed in NAD AAOx3  Eyes: PERRLA EOMI  Head: Normocephalic atraumatic  Mouth: MMM  Cardiac: regular rate, b/l symmetrical radial pulses, no LE edema  Resp: Lungs CTAB  GI: Abd s/nt/nd, no rebound or guarding.  Neuro: awake, alert, moving all extremities, cranial nerves 2-12 intact, sensation intact, no dysmetria.  Skin: No rashes

## 2021-03-05 NOTE — ED ADULT TRIAGE NOTE - CHIEF COMPLAINT QUOTE
pt presents to ED with complaints of chest pain "where my pacemaker is" and generalized body aches. pt denies sick contacts.

## 2021-03-05 NOTE — ED PROVIDER NOTE - PROVIDER TOKENS
PROVIDER:[TOKEN:[[id number]]] PROVIDER:[TOKEN:[[id number]]],PROVIDER:[TOKEN:[[id number]]]

## 2021-03-05 NOTE — ED PROVIDER NOTE - CARE PROVIDERS DIRECT ADDRESSES
,DirectAddress_Unknown ,DirectAddress_Unknown,[email or web address]

## 2021-03-05 NOTE — ED PROVIDER NOTE - OBJECTIVE STATEMENT
40 yo male with a PMH of cardiomegaly s/p PPM (st adam) on ASA 81mg daily, htn, presents with chest pain and palpitations since 4am this morning 40 yo male with a PMH of cardiomegaly s/p PPM (st adam) on ASA 81mg daily, htn, presents with chest pain and palpitations since 4am this morning. Pt states he didn't feel well last night and had a decreased appetite. +associated SOB and myalgia and arthralgia (elbows and knees). Denies cough, leg swelling, abd pain, n/v/d, fever.  PMD= stacy  Cardio= left to [place] and has not found another one to f/u with. 42 yo male with a PMH of cardiomyopathy s/p PPM (st adam) on ASA 81mg daily, htn, presents with chest pain and palpitations since 4am this morning. Pt states he didn't feel well last night and had a decreased appetite. +associated SOB and myalgia and arthralgia (elbows and knees). Denies cough, leg swelling, abd pain, n/v/d, fever.  PMD= stacy  Cardio= left to [place] and has not found another one to f/u with.

## 2021-03-05 NOTE — CONSULT NOTE ADULT - SUBJECTIVE AND OBJECTIVE BOX
40 year old male patient with pertinent history of ischemic cardiomyopathy with AICD( Last known EF 15% in 2014)   came to ED w/ chest pain & palpitaitons.  reports decreased appetite, dyspena & myalgias & arthralgias.  consulted for chest pain.  Chest pain is atypical, bilat. lasting sec not related to exertion.  Not related to eating position palpation.  ICD implanted by [facility] electrophysiologist  years ago.  Poor followup w/ general cardiology    Review of chart shows inappropriate ICD shock x 4  in Mar '20 had EP study was due to inappropriate   sinus tachycardia.    PAST MEDICAL AND SURGICAL HISTORY:  HTN (hypertension)  defibrillator    ALLERGIES:  Allergies  No Known Allergies  Intolerances    SOCIAL HISTORY:  neg x 3    FAMILY  HISTORY:  FH: HTN (hypertension)    MEDICATIONS:  OUTPATIENT:  Home Medications:    INPATIENT:  MEDICATIONS  (STANDING):    MEDICATIONS  (PRN):    MEDICATIONS  (PRN):    REVIEW OF SYSTEMS:    CONSTITUTIONAL: No weakness, fevers or chills  EYES/ENT: No visual changes;  No vertigo or throat pain   NECK: No pain or stiffness  RESPIRATORY: No cough, wheezing, hemoptysis; No shortness of breath  CARDIOVASCULAR: No chest pain or palpitations  GASTROINTESTINAL: No abdominal or epigastric pain. No nausea, vomiting, or hematemesis; No diarrhea or constipation. No melena or hematochezia.  GENITOURINARY: No dysuria, frequency or hematuria  NEUROLOGICAL: No numbness or weakness  SKIN: No itching, burning, rashes, or lesions   All other review of systems is negative unless indicated above    Vital Signs Last 24 Hrs  T(C): 37.1 (05 Mar 2021 16:22), Max: 37.1 (05 Mar 2021 16:22)  T(F): 98.7 (05 Mar 2021 16:22), Max: 98.7 (05 Mar 2021 16:22)  HR: 87 (05 Mar 2021 17:48) (87 - 122)  BP: 123/84 (05 Mar 2021 17:48) (119/91 - 125/77)  BP(mean): 93 (05 Mar 2021 17:00) (88 - 93)  RR: 16 (05 Mar 2021 17:48) (15 - 18)  SpO2: 99% (05 Mar 2021 17:48) (96% - 99%)      PHYSICAL EXAM:    Constitutional: NAD, awake and alert, well-developed  HEENT: PERR, EOMI,  No oral cyananosis.  Neck:  supple,  No JVD  Respiratory: Breath sounds are clear bilaterally, No wheezing, rales or rhonchi  Cardiovascular: S1 and S2, regular rate and rhythm, no Murmurs, gallops or rubs  Gastrointestinal: Bowel Sounds present, soft, nontender.   Extremities: No peripheral edema. No clubbing or cyanosis.  Vascular: 2+ peripheral pulses  Neurological: A/O x 3, no focal deficits      LABS: All Labs Reviewed:                        11.7   11.18 )-----------( 104      ( 05 Mar 2021 13:26 )             37.3     05 Mar 2021 13:26    135    |  101    |  13     ----------------------------<  135    3.6     |  23     |  1.09     Ca    8.9        05 Mar 2021 13:26  Mg     2.1       05 Mar 2021 13:26    TPro  8.7    /  Alb  4.1    /  TBili  1.7    /  DBili  x      /  AST  143    /  ALT  66     /  AlkPhos  92     05 Mar 2021 13:26    PT/INR - ( 05 Mar 2021 13:26 )   PT: 14.3 sec;   INR: 1.23 ratio         PTT - ( 05 Mar 2021 13:26 )  PTT:34.0 sec  CARDIAC MARKERS ( 05 Mar 2021 16:41 )  <0.015 ng/mL / x     / x     / x     / x      CARDIAC MARKERS ( 05 Mar 2021 13:26 )  <0.015 ng/mL / x     / x     / x     / x          Blood Culture:   03-05 @ 13:26  Pro Bnp 440    03-05 @ 13:26  TSH: 1.25    ===============================  EKG:  < from: 12 Lead ECG (03.13.20 @ 08:24) >  Diagnosis Line Atrial-sensed ventricular-paced rhythm  Biventricular pacemaker detected  Abnormal ECG  When compared with ECG of 12-MAR-2020 12:48,  No significant change was found  Confirmed by HASMUKH THOMASON, NICOLE ([id number]) on 3/13/2020 6:03:23 PM    ===============================

## 2021-03-05 NOTE — ED PROVIDER NOTE - NSFOLLOWUPINSTRUCTIONS_ED_ALL_ED_FT

## 2021-03-31 RX ORDER — METOPROLOL TARTRATE 50 MG/1
50 TABLET, FILM COATED ORAL
Qty: 180 | Refills: 1 | Status: ACTIVE | COMMUNITY
Start: 2020-04-20 | End: 1900-01-01

## 2021-03-31 RX ORDER — LISINOPRIL 10 MG/1
10 TABLET ORAL DAILY
Qty: 3 | Refills: 1 | Status: ACTIVE | COMMUNITY
Start: 2020-04-20 | End: 1900-01-01

## 2021-04-15 NOTE — ED BEHAVIORAL HEALTH ASSESSMENT NOTE - ACCOMPANIED BY
Ok for [employer], scripts being filled here, Norco given for pain, video watched, dsg clean,dry & intact. daughter at bedside. Self

## 2021-06-08 ENCOUNTER — APPOINTMENT (OUTPATIENT)
Dept: ELECTROPHYSIOLOGY | Facility: CLINIC | Age: 41
End: 2021-06-08

## 2021-11-16 ENCOUNTER — APPOINTMENT (OUTPATIENT)
Dept: ELECTROPHYSIOLOGY | Facility: CLINIC | Age: 41
End: 2021-11-16
Payer: COMMERCIAL

## 2021-11-16 ENCOUNTER — INPATIENT (INPATIENT)
Facility: HOSPITAL | Age: 41
LOS: 2 days | Discharge: HOME | DRG: 176 | End: 2021-11-19
Attending: INTERNAL MEDICINE | Admitting: SURGERY
Payer: COMMERCIAL

## 2021-11-16 VITALS
DIASTOLIC BLOOD PRESSURE: 94 MMHG | RESPIRATION RATE: 19 BRPM | OXYGEN SATURATION: 100 % | WEIGHT: 186.38 LBS | HEART RATE: 108 BPM | SYSTOLIC BLOOD PRESSURE: 135 MMHG

## 2021-11-16 VITALS
OXYGEN SATURATION: 100 % | DIASTOLIC BLOOD PRESSURE: 82 MMHG | RESPIRATION RATE: 18 BRPM | HEART RATE: 92 BPM | SYSTOLIC BLOOD PRESSURE: 138 MMHG | TEMPERATURE: 99 F

## 2021-11-16 DIAGNOSIS — I47.2 VENTRICULAR TACHYCARDIA: ICD-10-CM

## 2021-11-16 LAB
ADD ON TEST-SPECIMEN IN LAB: SIGNIFICANT CHANGE UP
ALBUMIN SERPL ELPH-MCNC: 3.8 G/DL — SIGNIFICANT CHANGE UP (ref 3.3–5)
ALP SERPL-CCNC: 99 U/L — SIGNIFICANT CHANGE UP (ref 40–120)
ALT FLD-CCNC: 136 U/L — HIGH (ref 12–78)
ANION GAP SERPL CALC-SCNC: 6 MMOL/L — SIGNIFICANT CHANGE UP (ref 5–17)
APTT BLD: 37.2 SEC — HIGH (ref 27.5–35.5)
AST SERPL-CCNC: 357 U/L — HIGH (ref 15–37)
BASOPHILS # BLD AUTO: 0.08 K/UL — SIGNIFICANT CHANGE UP (ref 0–0.2)
BASOPHILS NFR BLD AUTO: 1 % — SIGNIFICANT CHANGE UP (ref 0–2)
BILIRUB SERPL-MCNC: 1.2 MG/DL — SIGNIFICANT CHANGE UP (ref 0.2–1.2)
BUN SERPL-MCNC: 12 MG/DL — SIGNIFICANT CHANGE UP (ref 7–23)
CALCIUM SERPL-MCNC: 9.2 MG/DL — SIGNIFICANT CHANGE UP (ref 8.5–10.1)
CHLORIDE SERPL-SCNC: 104 MMOL/L — SIGNIFICANT CHANGE UP (ref 96–108)
CK SERPL-CCNC: 97 U/L — SIGNIFICANT CHANGE UP (ref 26–308)
CO2 SERPL-SCNC: 28 MMOL/L — SIGNIFICANT CHANGE UP (ref 22–31)
CREAT SERPL-MCNC: 1.02 MG/DL — SIGNIFICANT CHANGE UP (ref 0.5–1.3)
EOSINOPHIL # BLD AUTO: 0.12 K/UL — SIGNIFICANT CHANGE UP (ref 0–0.5)
EOSINOPHIL NFR BLD AUTO: 1.5 % — SIGNIFICANT CHANGE UP (ref 0–6)
ETHANOL SERPL-MCNC: <10 MG/DL — SIGNIFICANT CHANGE UP (ref 0–10)
GLUCOSE SERPL-MCNC: 123 MG/DL — HIGH (ref 70–99)
HCT VFR BLD CALC: 36.9 % — LOW (ref 39–50)
HGB BLD-MCNC: 11.2 G/DL — LOW (ref 13–17)
IMM GRANULOCYTES NFR BLD AUTO: 0.3 % — SIGNIFICANT CHANGE UP (ref 0–1.5)
INR BLD: 1.22 RATIO — HIGH (ref 0.88–1.16)
LYMPHOCYTES # BLD AUTO: 1.52 K/UL — SIGNIFICANT CHANGE UP (ref 1–3.3)
LYMPHOCYTES # BLD AUTO: 19.2 % — SIGNIFICANT CHANGE UP (ref 13–44)
MAGNESIUM SERPL-MCNC: 1.9 MG/DL — SIGNIFICANT CHANGE UP (ref 1.6–2.6)
MCHC RBC-ENTMCNC: 24 PG — LOW (ref 27–34)
MCHC RBC-ENTMCNC: 30.4 GM/DL — LOW (ref 32–36)
MCV RBC AUTO: 79.2 FL — LOW (ref 80–100)
MONOCYTES # BLD AUTO: 0.87 K/UL — SIGNIFICANT CHANGE UP (ref 0–0.9)
MONOCYTES NFR BLD AUTO: 11 % — SIGNIFICANT CHANGE UP (ref 2–14)
NEUTROPHILS # BLD AUTO: 5.32 K/UL — SIGNIFICANT CHANGE UP (ref 1.8–7.4)
NEUTROPHILS NFR BLD AUTO: 67 % — SIGNIFICANT CHANGE UP (ref 43–77)
PHOSPHATE SERPL-MCNC: 2.5 MG/DL — SIGNIFICANT CHANGE UP (ref 2.5–4.5)
PLATELET # BLD AUTO: 153 K/UL — SIGNIFICANT CHANGE UP (ref 150–400)
POTASSIUM SERPL-MCNC: 3.6 MMOL/L — SIGNIFICANT CHANGE UP (ref 3.5–5.3)
POTASSIUM SERPL-SCNC: 3.6 MMOL/L — SIGNIFICANT CHANGE UP (ref 3.5–5.3)
PROT SERPL-MCNC: 8.2 GM/DL — SIGNIFICANT CHANGE UP (ref 6–8.3)
PROTHROM AB SERPL-ACNC: 14.1 SEC — HIGH (ref 10.6–13.6)
RBC # BLD: 4.66 M/UL — SIGNIFICANT CHANGE UP (ref 4.2–5.8)
RBC # FLD: 17.5 % — HIGH (ref 10.3–14.5)
SARS-COV-2 RNA SPEC QL NAA+PROBE: SIGNIFICANT CHANGE UP
SODIUM SERPL-SCNC: 138 MMOL/L — SIGNIFICANT CHANGE UP (ref 135–145)
WBC # BLD: 7.93 K/UL — SIGNIFICANT CHANGE UP (ref 3.8–10.5)
WBC # FLD AUTO: 7.93 K/UL — SIGNIFICANT CHANGE UP (ref 3.8–10.5)

## 2021-11-16 PROCEDURE — 81003 URINALYSIS AUTO W/O SCOPE: CPT

## 2021-11-16 PROCEDURE — 93005 ELECTROCARDIOGRAM TRACING: CPT

## 2021-11-16 PROCEDURE — 93306 TTE W/DOPPLER COMPLETE: CPT | Mod: 26

## 2021-11-16 PROCEDURE — 36415 COLL VENOUS BLD VENIPUNCTURE: CPT

## 2021-11-16 PROCEDURE — 85027 COMPLETE CBC AUTOMATED: CPT

## 2021-11-16 PROCEDURE — 33264 RMVL & RPLCMT DFB GEN MLT LD: CPT

## 2021-11-16 PROCEDURE — 80053 COMPREHEN METABOLIC PANEL: CPT

## 2021-11-16 PROCEDURE — 84100 ASSAY OF PHOSPHORUS: CPT

## 2021-11-16 PROCEDURE — 71045 X-RAY EXAM CHEST 1 VIEW: CPT | Mod: 26

## 2021-11-16 PROCEDURE — 86769 SARS-COV-2 COVID-19 ANTIBODY: CPT

## 2021-11-16 PROCEDURE — 99291 CRITICAL CARE FIRST HOUR: CPT

## 2021-11-16 PROCEDURE — 83735 ASSAY OF MAGNESIUM: CPT

## 2021-11-16 PROCEDURE — 93284 PRGRMG EVAL IMPLANTABLE DFB: CPT

## 2021-11-16 PROCEDURE — C1889: CPT

## 2021-11-16 PROCEDURE — C1882: CPT

## 2021-11-16 PROCEDURE — 93306 TTE W/DOPPLER COMPLETE: CPT

## 2021-11-16 PROCEDURE — 93010 ELECTROCARDIOGRAM REPORT: CPT

## 2021-11-16 RX ORDER — METOPROLOL TARTRATE 50 MG
50 TABLET ORAL
Refills: 0 | Status: DISCONTINUED | OUTPATIENT
Start: 2021-11-16 | End: 2021-11-19

## 2021-11-16 RX ORDER — THIAMINE MONONITRATE (VIT B1) 100 MG
100 TABLET ORAL DAILY
Refills: 0 | Status: COMPLETED | OUTPATIENT
Start: 2021-11-16 | End: 2021-11-19

## 2021-11-16 RX ORDER — ACETAMINOPHEN 500 MG
650 TABLET ORAL EVERY 6 HOURS
Refills: 0 | Status: DISCONTINUED | OUTPATIENT
Start: 2021-11-16 | End: 2021-11-19

## 2021-11-16 RX ORDER — SODIUM CHLORIDE 9 MG/ML
1000 INJECTION INTRAMUSCULAR; INTRAVENOUS; SUBCUTANEOUS ONCE
Refills: 0 | Status: COMPLETED | OUTPATIENT
Start: 2021-11-16 | End: 2021-11-16

## 2021-11-16 RX ORDER — SODIUM CHLORIDE 9 MG/ML
1000 INJECTION, SOLUTION INTRAVENOUS
Refills: 0 | Status: DISCONTINUED | OUTPATIENT
Start: 2021-11-16 | End: 2021-11-17

## 2021-11-16 RX ORDER — MAGNESIUM SULFATE 500 MG/ML
2 VIAL (ML) INJECTION ONCE
Refills: 0 | Status: COMPLETED | OUTPATIENT
Start: 2021-11-16 | End: 2021-11-16

## 2021-11-16 RX ORDER — METOPROLOL TARTRATE 50 MG
50 TABLET ORAL ONCE
Refills: 0 | Status: COMPLETED | OUTPATIENT
Start: 2021-11-16 | End: 2021-11-16

## 2021-11-16 RX ORDER — PROCAINAMIDE HCL 500 MG
1000 TABLET, EXTENDED RELEASE ORAL ONCE
Refills: 0 | Status: COMPLETED | OUTPATIENT
Start: 2021-11-16 | End: 2021-11-16

## 2021-11-16 RX ORDER — PROCAINAMIDE HCL 500 MG
2 TABLET, EXTENDED RELEASE ORAL
Qty: 2000 | Refills: 0 | Status: DISCONTINUED | OUTPATIENT
Start: 2021-11-16 | End: 2021-11-17

## 2021-11-16 RX ORDER — CHLORHEXIDINE GLUCONATE 213 G/1000ML
1 SOLUTION TOPICAL
Refills: 0 | Status: DISCONTINUED | OUTPATIENT
Start: 2021-11-16 | End: 2021-11-19

## 2021-11-16 RX ORDER — ASPIRIN 81 MG
81 TABLET, DELAYED RELEASE (ENTERIC COATED) ORAL
Refills: 0 | Status: ACTIVE | COMMUNITY

## 2021-11-16 RX ORDER — POTASSIUM CHLORIDE 20 MEQ
40 PACKET (EA) ORAL ONCE
Refills: 0 | Status: COMPLETED | OUTPATIENT
Start: 2021-11-16 | End: 2021-11-16

## 2021-11-16 RX ORDER — INFLUENZA VIRUS VACCINE 15; 15; 15; 15 UG/.5ML; UG/.5ML; UG/.5ML; UG/.5ML
0.5 SUSPENSION INTRAMUSCULAR ONCE
Refills: 0 | Status: DISCONTINUED | OUTPATIENT
Start: 2021-11-16 | End: 2021-11-19

## 2021-11-16 RX ADMIN — Medication 50 GRAM(S): at 17:56

## 2021-11-16 RX ADMIN — Medication 330 MILLIGRAM(S): at 17:50

## 2021-11-16 RX ADMIN — Medication 50 MILLIGRAM(S): at 16:55

## 2021-11-16 RX ADMIN — Medication 50 MILLIGRAM(S): at 16:58

## 2021-11-16 RX ADMIN — Medication 2 MILLIGRAM(S): at 21:53

## 2021-11-16 RX ADMIN — Medication 50 MILLIGRAM(S): at 20:08

## 2021-11-16 RX ADMIN — Medication 2 MILLIGRAM(S): at 19:00

## 2021-11-16 RX ADMIN — SODIUM CHLORIDE 1000 MILLILITER(S): 9 INJECTION INTRAMUSCULAR; INTRAVENOUS; SUBCUTANEOUS at 16:54

## 2021-11-16 RX ADMIN — Medication 50 MILLIGRAM(S): at 21:53

## 2021-11-16 RX ADMIN — SODIUM CHLORIDE 100 MILLILITER(S): 9 INJECTION, SOLUTION INTRAVENOUS at 18:25

## 2021-11-16 RX ADMIN — Medication 40 MILLIEQUIVALENT(S): at 17:55

## 2021-11-16 RX ADMIN — Medication 30 MG/MIN: at 18:23

## 2021-11-16 NOTE — H&P ADULT - NSHPPHYSICALEXAM_GEN_ALL_CORE
O:    Adult M lying in bed  No JVD trachea midline  S1S2+ paced in 70's  CTA B/L  Abd soft NTND  No leg swelling/edema noted  Awake and alert  Skin pink and well perfused

## 2021-11-16 NOTE — H&P ADULT - ASSESSMENT
A:    41M  HD # 1  FULL CODE    Here for:    1. NSVT 2/2  2. CM with non functioning AICD  3. Alcoholism    P:    Admit to CCU for antiarrythmic drip.  Pt not critically ill, but CCU dispo required 2/2 hospital protocol.    Librium tap + ativan IV CIWA, does not appear to be actively withdrawing at this moment.   HD monitoring. ~ 20 beat run NSVT; Pt has AICD 2/2 CM but not functional at the moment. Started on procainamide drip in ER in consultation with EPS, plan for generator changes yesterday.   K+ 3.6, give KCL 40mg PO x 1.  Mg++ 1.9, give 2g IV x 1 now.   PO diet for now, NPO p MN.  IVF, LR @ 100cc/hr.  Thiamine.  VTE SCD only, hold chemical until procedure done.   Hgb 11.2, Plt 153, no s/s active bleeding.  No s/s systemic infx, hold abx.  Elevated LFT's, suspect 2/2 daily etoh use.  f/u AM labs, replete labs in AM.  No invasive lines or catheters.     Dispo: Admit to CCU. Continue antiarrythmic. Plan for EPS procedure tomorrow. NPO p MN.

## 2021-11-16 NOTE — ED PROVIDER NOTE - CLINICAL SUMMARY MEDICAL DECISION MAKING FREE TEXT BOX
Pt currently asymptomatic however at 18 beats of sustained V tach. Stable vitals. Will place pads on and start procainamide. aicd generator battery finished & per NP Mamie will replace on thursday or friday

## 2021-11-16 NOTE — H&P ADULT - HISTORY OF PRESENT ILLNESS
S:    Pt seen and examined  HD # 1  FULL CODE  PMHx CM s/p ICD/PPM 2014, daily alcohol drinker  Pt sent in from Cardiologist office for evaluation  Per Pt, fatigue lately  Per EPS, Pt requires generator changes  ~ 17 beat run VT in ER, started on antiarrythmic drip    11/16 PM: Pending procainamide drip. s/p lopressor, IVF.

## 2021-11-16 NOTE — CONSULT NOTE ADULT - SUBJECTIVE AND OBJECTIVE BOX
This is a 41 yr old male with PMHx of CHF, nonischemic dilated CM and prior EF 15% who had CRT-D implanted in 2014, HTN, inappropriate sinus tachycardia.  He had ICD shock last in March 2020 for inappropriate sinus tach and underwent EP study, no inducible arrhythmias.   His EF was improved at that time to 50-55%.  He has been non-compliant with follow up and medications, had change of insurance and has not seen cardiologist.  He came to outpatient EP clinic today for ICD interrogation and the device was found to be at [facility] since 4/2021.  He was sent to the ED for admission and he may have etoh withdrawal.    11/16/21:  TELE: sinus tachycardia with biventricular pacing.  Run of WCT 18 beats  EKG: sinus with BI-V pacing 88 bpm, , , QT/QTc 424/513    PAST MEDICAL & SURGICAL HISTORY:  Pacemaker    HTN (hypertension)    MEDICATIONS  (STANDING):  chlordiazePOXIDE   Oral   chlordiazePOXIDE 50 milliGRAM(s) Oral every 6 hours  chlorhexidine 4% Liquid 1 Application(s) Topical <User Schedule>  lactated ringers. 1000 milliLiter(s) (100 mL/Hr) IV Continuous <Continuous>  metoprolol tartrate 50 milliGRAM(s) Oral two times a day  procainamide   Infusion 2 mG/Min (30 mL/Hr) IV Continuous <Continuous>  thiamine Injectable 100 milliGRAM(s) IV Push daily    MEDICATIONS  (PRN):  acetaminophen     Tablet .. 650 milliGRAM(s) Oral every 6 hours PRN Mild Pain (1 - 3)  LORazepam   Injectable 2 milliGRAM(s) IV Push every 1 hour PRN Symptom-triggered: each CIWA -Ar score 8 or GREATER    Allergies    No Known Allergies    Intolerances    REVIEW OF SYSTEMS:    CONSTITUTIONAL: + anxious  EYES/ENT: No visual changes;  No vertigo or throat pain   NECK: No pain or stiffness  RESPIRATORY: No cough, wheezing, hemoptysis; No shortness of breath  CARDIOVASCULAR: No chest pain or palpitations  GASTROINTESTINAL: No abdominal or epigastric pain. No nausea, vomiting, or hematemesis; No diarrhea or constipation. No melena or hematochezia.  GENITOURINARY: No dysuria, frequency or hematuria  NEUROLOGICAL: No numbness or weakness  SKIN: No itching, burning, rashes, or lesions   All other review of systems is negative unless indicated above    Vital Signs Last 24 Hrs  T(C): 36.7 (16 Nov 2021 18:27), Max: 37.1 (16 Nov 2021 15:48)  T(F): 98 (16 Nov 2021 18:27), Max: 98.7 (16 Nov 2021 15:48)  HR: 76 (16 Nov 2021 18:27) (76 - 92)  BP: 120/81 (16 Nov 2021 18:27) (120/81 - 138/82)  BP(mean): 90 (16 Nov 2021 18:27) (90 - 90)  RR: 20 (16 Nov 2021 18:27) (18 - 20)  SpO2: 100% (16 Nov 2021 18:27) (100% - 100%)                            11.2   7.93  )-----------( 153      ( 16 Nov 2021 15:56 )             36.9       11-16    138  |  104  |  12  ----------------------------<  123<H>  3.6   |  28  |  1.02    Ca    9.2      16 Nov 2021 15:56  Phos  2.5     11-16  Mg     1.9     11-16    TPro  8.2  /  Alb  3.8  /  TBili  1.2  /  DBili  x   /  AST  357<H>  /  ALT  136<H>  /  AlkPhos  99  11-16    PT/INR - ( 16 Nov 2021 15:56 )   PT: 14.1 sec;   INR: 1.22 ratio         PTT - ( 16 Nov 2021 15:56 )  PTT:37.2 sec     TroponinI hsT: <-10.70      PHYSICAL EXAM:    General: WN/WD   Neurology: A&Ox3, nonfocal, ROJAS x 4  HEENT: NC/AT, neck supple, no JVD, EOMI, PERRL  Respiratory: CTA B/L  CV: RRR, S1S2, no murmurs, rubs or gallops  Abdominal: Soft, NT, ND +BS  Extremities: No edema, + peripheral pulses  Skin: No rashes      < from: TTE Echo Complete w/o contrast w/ Doppler (03.11.20 @ 09:05) >   Impression     Summary     The leftventricle is normal in size, wall motion and contractility.   Mild concentric left ventricular hypertrophy is present.   Estimated left ventricular ejection fraction is 50-55 %.   The left atrium is mildly dilated.   Normal appearing right atrium.   A device wire is seen in the RV and RA.   Normal appearing right ventricle structure and function.   Normal aortic valve structure and function.   Mild (1+) mitral regurgitation is present.   Mild (1+) tricuspid valve regurgitation is present.   Normal appearing pulmonic valve structure and function.   No evidence of pericardial effusion.   No evidence of pleural effusion.   All visualized extra cardiac structures appears to be normal.

## 2021-11-16 NOTE — ED PROVIDER NOTE - PROGRESS NOTE DETAILS
Adele Huizar  for attending Dr. Chavarria: Pt currently asymptomatic however at 18 beats of sustained V tach. Stable vitals. Will place pads on and start procainamide. Pt had routine follow up at EP clinic and found that generator for ICD has been out of battery since April, but pt has been noncompliant with meds. Follow up due to alcohol. Adele Huizar  for attending Dr. Chavarria: Pt currently asymptomatic however at 18 beats of sustained V tach. Stable vitals. Will place pads on and start procainamide. marlo: icu herlinda herrera will admit to ccu under dr nicole

## 2021-11-16 NOTE — PHARMACOTHERAPY INTERVENTION NOTE - COMMENTS
Medication reconciliation completed.  Reviewed Medication list and confirmed med allergies with patient; confirmed with Dr. Allan MedHx.  Pt confirms he has prescription for Lisinopril 10mg 1 tab daily & metroprolol Tart 50mg 1 tab 2x daily but has not taken since April 2021.  Pt also supposed to take ASA 81mg but has not taken since April 2021.

## 2021-11-16 NOTE — ED PROVIDER NOTE - OBJECTIVE STATEMENT
Pertinent HPI/PMH/PSH/FHx/SHx and Review of Systems contained within  HPI:  Patient p/w CC   x  days, new onset vs acute on chronic.   PMH/PSH relevant for: cardiomyopathy s/p AICD.  ROS negative for: fever, Chest pain, SOB, Nausea, vomiting, diarrhea, abdominal pain, dysuria    FamilyHx and SocialHx not otherwise contributory Pertinent HPI/PMH/PSH/FHx/SHx and Review of Systems contained within  HPI:  Patient p/w CC Pt had routine follow up at EP clinic today and found that generator for ICD has been out of battery since April, but pt has been noncompliant with meds & Follow up due to alcohol. drinks 8 beers daily, last etoh yesterday. no symptoms now  PMH/PSH relevant for: cardiomyopathy s/p AICD.  ROS negative for: fever, Chest pain, SOB, Nausea, vomiting, diarrhea, abdominal pain, dysuria    FamilyHx and SocialHx not otherwise contributory

## 2021-11-16 NOTE — ED ADULT NURSE NOTE - OBJECTIVE STATEMENT
pt is 40 yo male with pmhx pt is 42 yo male with pmhx HTN, AICD/PPM sent from EP outpatient clinic for replacing the ep generator that needs battery replacement, pt states he is daily drinker 10/day, last drink last night.  pt aaox4, pt denies any palpitations, cp, sob, or nvd.  Paced rhythm 90s to 100 noted on monitor.

## 2021-11-16 NOTE — ED ADULT TRIAGE NOTE - CHIEF COMPLAINT QUOTE
Pt comes to the ED from cardiology office. Pt was being seen because he has been fatigued. Pt with hx of PPM placed in April. Pt has been drinking alcohol since. Pt needs a generator change. Pt states that last drink was last night. Pt admits to drinking 10-12 drinks of beer/liquor per day.

## 2021-11-16 NOTE — CONSULT NOTE ADULT - ASSESSMENT
41 yr old male with above history found to have ICD at [facility] since April 2021, sent from outpatient EP clinic to ER for admission and ICD generator change in a few days.  He may have etoh withdrawal.      A/P:  AICD at end of service  Continuous tele monitoring  Keep defib pads on patient  Repeat echo  Cardiology consult  Plan for ICD generator change Thur or Fri if patient is stable.  Discussed with Dr. Mcguire, patient, RN

## 2021-11-16 NOTE — ED PROVIDER NOTE - CARE PLAN
1 Principal Discharge DX:	Ventricular tachycardia  Secondary Diagnosis:	AICD at end of battery life

## 2021-11-17 LAB
ALBUMIN SERPL ELPH-MCNC: 3.3 G/DL — SIGNIFICANT CHANGE UP (ref 3.3–5)
ALP SERPL-CCNC: 88 U/L — SIGNIFICANT CHANGE UP (ref 40–120)
ALT FLD-CCNC: 97 U/L — HIGH (ref 12–78)
ANION GAP SERPL CALC-SCNC: 6 MMOL/L — SIGNIFICANT CHANGE UP (ref 5–17)
APPEARANCE UR: CLEAR — SIGNIFICANT CHANGE UP
AST SERPL-CCNC: 178 U/L — HIGH (ref 15–37)
BILIRUB SERPL-MCNC: 0.9 MG/DL — SIGNIFICANT CHANGE UP (ref 0.2–1.2)
BILIRUB UR-MCNC: NEGATIVE — SIGNIFICANT CHANGE UP
BUN SERPL-MCNC: 8 MG/DL — SIGNIFICANT CHANGE UP (ref 7–23)
CALCIUM SERPL-MCNC: 8.3 MG/DL — LOW (ref 8.5–10.1)
CHLORIDE SERPL-SCNC: 105 MMOL/L — SIGNIFICANT CHANGE UP (ref 96–108)
CO2 SERPL-SCNC: 25 MMOL/L — SIGNIFICANT CHANGE UP (ref 22–31)
COLOR SPEC: YELLOW — SIGNIFICANT CHANGE UP
COVID-19 NUCLEOCAPSID GAM AB INTERP: POSITIVE
COVID-19 NUCLEOCAPSID TOTAL GAM ANTIBODY RESULT: 119 INDEX — HIGH
COVID-19 SPIKE DOMAIN AB INTERP: POSITIVE
COVID-19 SPIKE DOMAIN ANTIBODY RESULT: >250 U/ML — HIGH
CREAT SERPL-MCNC: 0.7 MG/DL — SIGNIFICANT CHANGE UP (ref 0.5–1.3)
DIFF PNL FLD: NEGATIVE — SIGNIFICANT CHANGE UP
GLUCOSE SERPL-MCNC: 105 MG/DL — HIGH (ref 70–99)
GLUCOSE UR QL: NEGATIVE MG/DL — SIGNIFICANT CHANGE UP
HCT VFR BLD CALC: 33.3 % — LOW (ref 39–50)
HGB BLD-MCNC: 9.9 G/DL — LOW (ref 13–17)
KETONES UR-MCNC: NEGATIVE — SIGNIFICANT CHANGE UP
LEUKOCYTE ESTERASE UR-ACNC: NEGATIVE — SIGNIFICANT CHANGE UP
MAGNESIUM SERPL-MCNC: 2.1 MG/DL — SIGNIFICANT CHANGE UP (ref 1.6–2.6)
MCHC RBC-ENTMCNC: 23.6 PG — LOW (ref 27–34)
MCHC RBC-ENTMCNC: 29.7 GM/DL — LOW (ref 32–36)
MCV RBC AUTO: 79.5 FL — LOW (ref 80–100)
NITRITE UR-MCNC: NEGATIVE — SIGNIFICANT CHANGE UP
PH UR: 7 — SIGNIFICANT CHANGE UP (ref 5–8)
PHOSPHATE SERPL-MCNC: 2.6 MG/DL — SIGNIFICANT CHANGE UP (ref 2.5–4.5)
PLATELET # BLD AUTO: 129 K/UL — LOW (ref 150–400)
POTASSIUM SERPL-MCNC: 3.8 MMOL/L — SIGNIFICANT CHANGE UP (ref 3.5–5.3)
POTASSIUM SERPL-SCNC: 3.8 MMOL/L — SIGNIFICANT CHANGE UP (ref 3.5–5.3)
PROT SERPL-MCNC: 7.3 GM/DL — SIGNIFICANT CHANGE UP (ref 6–8.3)
PROT UR-MCNC: NEGATIVE MG/DL — SIGNIFICANT CHANGE UP
RBC # BLD: 4.19 M/UL — LOW (ref 4.2–5.8)
RBC # FLD: 17.2 % — HIGH (ref 10.3–14.5)
SARS-COV-2 IGG+IGM SERPL QL IA: 119 INDEX — HIGH
SARS-COV-2 IGG+IGM SERPL QL IA: >250 U/ML — HIGH
SARS-COV-2 IGG+IGM SERPL QL IA: POSITIVE
SARS-COV-2 IGG+IGM SERPL QL IA: POSITIVE
SODIUM SERPL-SCNC: 136 MMOL/L — SIGNIFICANT CHANGE UP (ref 135–145)
SP GR SPEC: 1 — LOW (ref 1.01–1.02)
UROBILINOGEN FLD QL: 1 MG/DL
WBC # BLD: 5.4 K/UL — SIGNIFICANT CHANGE UP (ref 3.8–10.5)
WBC # FLD AUTO: 5.4 K/UL — SIGNIFICANT CHANGE UP (ref 3.8–10.5)

## 2021-11-17 PROCEDURE — 99232 SBSQ HOSP IP/OBS MODERATE 35: CPT

## 2021-11-17 PROCEDURE — 99222 1ST HOSP IP/OBS MODERATE 55: CPT

## 2021-11-17 RX ORDER — ENOXAPARIN SODIUM 100 MG/ML
40 INJECTION SUBCUTANEOUS DAILY
Refills: 0 | Status: DISCONTINUED | OUTPATIENT
Start: 2021-11-17 | End: 2021-11-17

## 2021-11-17 RX ADMIN — Medication 50 MILLIGRAM(S): at 10:53

## 2021-11-17 RX ADMIN — Medication 50 MILLIGRAM(S): at 06:15

## 2021-11-17 RX ADMIN — Medication 50 MILLIGRAM(S): at 00:38

## 2021-11-17 RX ADMIN — Medication 100 MILLIGRAM(S): at 12:42

## 2021-11-17 RX ADMIN — Medication 50 MILLIGRAM(S): at 11:46

## 2021-11-17 RX ADMIN — Medication 50 MILLIGRAM(S): at 21:31

## 2021-11-17 RX ADMIN — Medication 30 MG/MIN: at 12:43

## 2021-11-17 RX ADMIN — CHLORHEXIDINE GLUCONATE 1 APPLICATION(S): 213 SOLUTION TOPICAL at 06:15

## 2021-11-17 NOTE — DIETITIAN INITIAL EVALUATION ADULT. - NUTRITION DIAGNOSIS
yes... Tremfya Counseling: I discussed with the patient the risks of guselkumab including but not limited to immunosuppression, serious infections, worsening of inflammatory bowel disease and drug reactions.  The patient understands that monitoring is required including a PPD at baseline and must alert us or the primary physician if symptoms of infection or other concerning signs are noted.

## 2021-11-17 NOTE — PROGRESS NOTE ADULT - ATTENDING COMMENTS
pt with cmp and crt-d device at [facility] will need icd gen change at this time, cont chf medical therapy and encouraged alcohol abstinence

## 2021-11-17 NOTE — PROGRESS NOTE ADULT - SUBJECTIVE AND OBJECTIVE BOX
This is a 41 yr old male with PMHx of CHF, nonischemic dilated CM and prior EF 15% who had CRT-D implanted in 2014, HTN, inappropriate sinus tachycardia.  He had ICD shock last in March 2020 for inappropriate sinus tach and underwent EP study, no inducible arrhythmias.   His EF was improved at that time to 50-55%.  He has been non-compliant with follow up and medications, had change of insurance and has not seen cardiologist.  He came to outpatient EP clinic today for ICD interrogation and the device was found to be at [facility] since 4/2021.  He was sent to the ED for admission and he may have etoh withdrawal.    11/16/21:  TELE: sinus tachycardia with biventricular pacing.  Run of WCT 18 beats  EKG: sinus with BI-V pacing 88 bpm, , , QT/QTc 424/513    11/17/21:  pt seen eating lunch in bed, in NAD, VSS, on Librium   TELE: sinus with bi-v pacing 80-90 bpm, no further NSVT      MEDICATIONS  (STANDING):  chlordiazePOXIDE   Oral   chlordiazePOXIDE 50 milliGRAM(s) Oral every 8 hours  chlorhexidine 4% Liquid 1 Application(s) Topical <User Schedule>  influenza   Vaccine 0.5 milliLiter(s) IntraMuscular once  metoprolol tartrate 50 milliGRAM(s) Oral two times a day  thiamine Injectable 100 milliGRAM(s) IV Push daily    MEDICATIONS  (PRN):  acetaminophen     Tablet .. 650 milliGRAM(s) Oral every 6 hours PRN Mild Pain (1 - 3)  LORazepam   Injectable 2 milliGRAM(s) IV Push every 1 hour PRN Symptom-triggered: each CIWA -Ar score 8 or GREATER    Allergies    No Known Allergies      REVIEW OF SYSTEMS:    CONSTITUTIONAL: No weakness, fevers or chills, less anxious  EYES/ENT: No visual changes;  No vertigo or throat pain   NECK: No pain or stiffness  RESPIRATORY: No cough, wheezing, hemoptysis; No shortness of breath  CARDIOVASCULAR: No chest pain or palpitations  GASTROINTESTINAL: No abdominal or epigastric pain. No nausea, vomiting, or hematemesis; No diarrhea or constipation. No melena or hematochezia.  GENITOURINARY: No dysuria, frequency or hematuria  NEUROLOGICAL: No numbness or weakness  SKIN: No itching, burning, rashes, or lesions   All other review of systems is negative unless indicated above      Vital Signs Last 24 Hrs  T(C): 36.4 (17 Nov 2021 12:00), Max: 37.1 (16 Nov 2021 15:48)  T(F): 97.5 (17 Nov 2021 12:00), Max: 98.7 (16 Nov 2021 15:48)  HR: 74 (17 Nov 2021 13:00) (69 - 99)  BP: 118/74 (17 Nov 2021 13:00) (106/64 - 138/82)  BP(mean): 83 (17 Nov 2021 13:00) (75 - 105)  RR: 16 (17 Nov 2021 13:00) (13 - 23)  SpO2: 97% (17 Nov 2021 13:00) (96% - 100%)      PHYSICAL EXAM:    General: WN/WD NAD  Neurology: A&Ox3, nonfocal, ROJAS x 4  HEENT: NC/AT, neck supple, no JVD, EOMI, PERRL  Respiratory: CTA B/L  CV: RRR, S1S2, no murmurs, rubs or gallops  Abdominal: Soft, NT, ND +BS  Extremities: No edema, + peripheral pulses  Skin: No rashes      < from: TTE Echo Complete w/o Contrast w/ Doppler (11.16.21 @ 19:58) >     Impression     Summary     The mitral valve leaflets appear normal; there is no evidence of stenosis,   fluttering or prolapse.   Mild (1+) mitral regurgitation is present.   Normal aortic valve structure and function.   The tricuspid valve leaflets are thin and pliable; valve motion is normal.   Mild (1+) tricuspid valve regurgitation is present.   Normal appearing pulmonic valve structure and function.   The left atrium is mildly dilated.   Estimated left ventricular ejection fraction is 55 %.   Mild concentric left ventricular hypertrophy is present.   Left ventricular wall motion is normal.   The right ventricle is normal in size.   A device wire is seen in the RV and RA.   Pleural effusion cannot be ruled out.     Signature     ----------------------------------------------------------------   Electronically signed by Zulema Rocha MD(Interpreting

## 2021-11-17 NOTE — DIETITIAN INITIAL EVALUATION ADULT. - ADD RECOMMEND
1) continue with current therapeutic diet DASH/TLC 2) monitor daily weights track trends 3) monitor lytes and hydration replete as needed 4) monitor strict I & O's (if no BM x 3 days initiate bowel regimen) 5) Add MVI w/minerals and folic acid with ordered Thiamine- ETOH protocol.

## 2021-11-17 NOTE — PROGRESS NOTE ADULT - SUBJECTIVE AND OBJECTIVE BOX
Events Overnight: on librium taper for dts    HPI:  This is a 41 yr old male with PMHx of CHF, nonischemic dilated CM and prior EF 15% who had CRT-D implanted in 2014, HTN, inappropriate sinus tachycardia.  He had ICD shock last in March 2020 for inappropriate sinus tach and underwent EP study, no inducible arrhythmias.   His EF has  improved at that time to 50-55%.  He has been non-compliant with follow up and medications, had change of insurance and has not seen cardiologist.  He came to outpatient EP clinic on 11/16  for ICD interrogation and the device was found to be at [facility] since 4/2021.  He was sent to the ED for admission and he may have etoh withdrawal. In ed had 16 beats of vt and was started on procainamide(inc lfts) by EP  no further ectopy    11/16/21:  TELE: sinus tachycardia with biventricular pacing.  Run of WCT 18 beats  EKG: sinus with BI-V pacing 88 bpm, , , QT/QTc 424/513    PAST MEDICAL & SURGICAL HISTORY:  Pacemaker    Social Hx.  alcohol use    HTN (hypertension)    ROS no chest pain, no sob, no fevers, chills, not tremulous, ros otherwise negative    MEDICATIONS  (STANDING):  chlordiazePOXIDE   Oral   chlordiazePOXIDE 50 milliGRAM(s) Oral every 6 hours  chlordiazePOXIDE 50 milliGRAM(s) Oral every 8 hours  chlorhexidine 4% Liquid 1 Application(s) Topical <User Schedule>  influenza   Vaccine 0.5 milliLiter(s) IntraMuscular once  metoprolol tartrate 50 milliGRAM(s) Oral two times a day  procainamide   Infusion 2 mG/Min (30 mL/Hr) IV Continuous <Continuous>  thiamine Injectable 100 milliGRAM(s) IV Push daily    MEDICATIONS  (PRN):  acetaminophen     Tablet .. 650 milliGRAM(s) Oral every 6 hours PRN Mild Pain (1 - 3)  LORazepam   Injectable 2 milliGRAM(s) IV Push every 1 hour PRN Symptom-triggered: each CIWA -Ar score 8 or GREATER      Height (cm): 170.2 (11-16 @ 16:05)  Weight (kg): 84.4 (11-16 @ 16:05)  BMI (kg/m2): 29.1 (11-16 @ 16:05)    ICU Vital Signs Last 24 Hrs  T(C): 36 (16 Nov 2021 22:38), Max: 37.1 (16 Nov 2021 15:48)  T(F): 96.8 (16 Nov 2021 22:38), Max: 98.7 (16 Nov 2021 15:48)  HR: 74 (17 Nov 2021 09:00) (72 - 92)  BP: 119/72 (17 Nov 2021 09:00) (112/71 - 138/82)  BP(mean): 83 (17 Nov 2021 09:00) (81 - 105)  ABP: --  ABP(mean): --  RR: 17 (17 Nov 2021 09:00) (16 - 23)  SpO2: 98% (17 Nov 2021 07:00) (96% - 100%)    I&O's Summary    16 Nov 2021 07:01  -  17 Nov 2021 07:00  --------------------------------------------------------  IN: 1510 mL / OUT: 900 mL / NET: 610 mL    Physical Exam    General comfortable,   HEENT nc/at , anicteric  neck supple  lungs clear   cv rrr,   abdomen soft, no tender  ext warm                       9.9    5.40  )-----------( 129      ( 17 Nov 2021 06:26 )             33.3       11-17    136  |  105  |  8   ----------------------------<  105<H>  3.8   |  25  |  0.70    Ca    8.3<L>      17 Nov 2021 06:26  Phos  2.6     11-17  Mg     2.1     11-17    TPro  7.3  /  Alb  3.3  /  TBili  0.9  /  DBili  x   /  AST  178<H>  /  ALT  97<H>  /  AlkPhos  88  11-17      CARDIAC MARKERS ( 16 Nov 2021 15:56 )  x     / x     / 97 U/L / x     / x        DVT Prophylaxis:  lovenox                                                               Advanced Directives: full code                      Events Overnight: on librium taper for dts    HPI:  This is a 41 yr old male with PMHx of CHF, nonischemic dilated CM and prior EF 15% who had CRT-D implanted in 2014, HTN, inappropriate sinus tachycardia.  He had ICD shock last in March 2020 for inappropriate sinus tach and underwent EP study, no inducible arrhythmias.   His EF has  improved at that time to 50-55%.  He has been non-compliant with follow up and medications, had change of insurance and has not seen cardiologist.  He came to outpatient EP clinic on 11/16  for ICD interrogation and the device was found to be at [facility] since 4/2021.  He was sent to the ED for admission and he may have etoh withdrawal. In ed had 16 beats of vt and was started on procainamide(inc lfts) by EP  no further ectopy    11/16/21:  TELE: sinus tachycardia with biventricular pacing.  Run of WCT 18 beats  EKG: sinus with BI-V pacing 88 bpm, , , QT/QTc 424/513    PAST MEDICAL & SURGICAL HISTORY:  Pacemaker    Social Hx.  alcohol use    HTN (hypertension)    ROS no chest pain, no sob, no fevers, chills, not tremulous, ros otherwise negative    MEDICATIONS  (STANDING):  chlordiazePOXIDE   Oral   chlordiazePOXIDE 50 milliGRAM(s) Oral every 6 hours  chlordiazePOXIDE 50 milliGRAM(s) Oral every 8 hours  chlorhexidine 4% Liquid 1 Application(s) Topical <User Schedule>  influenza   Vaccine 0.5 milliLiter(s) IntraMuscular once  metoprolol tartrate 50 milliGRAM(s) Oral two times a day  procainamide   Infusion 2 mG/Min (30 mL/Hr) IV Continuous <Continuous>  thiamine Injectable 100 milliGRAM(s) IV Push daily    MEDICATIONS  (PRN):  acetaminophen     Tablet .. 650 milliGRAM(s) Oral every 6 hours PRN Mild Pain (1 - 3)  LORazepam   Injectable 2 milliGRAM(s) IV Push every 1 hour PRN Symptom-triggered: each CIWA -Ar score 8 or GREATER      Height (cm): 170.2 (11-16 @ 16:05)  Weight (kg): 84.4 (11-16 @ 16:05)  BMI (kg/m2): 29.1 (11-16 @ 16:05)    ICU Vital Signs Last 24 Hrs  T(C): 36 (16 Nov 2021 22:38), Max: 37.1 (16 Nov 2021 15:48)  T(F): 96.8 (16 Nov 2021 22:38), Max: 98.7 (16 Nov 2021 15:48)  HR: 74 (17 Nov 2021 09:00) (72 - 92)  BP: 119/72 (17 Nov 2021 09:00) (112/71 - 138/82)  BP(mean): 83 (17 Nov 2021 09:00) (81 - 105)  ABP: --  ABP(mean): --  RR: 17 (17 Nov 2021 09:00) (16 - 23)  SpO2: 98% (17 Nov 2021 07:00) (96% - 100%)    I&O's Summary    16 Nov 2021 07:01  -  17 Nov 2021 07:00  --------------------------------------------------------  IN: 1510 mL / OUT: 900 mL / NET: 610 mL    Physical Exam    General comfortable,   HEENT nc/at , anicteric  neck supple  lungs clear   cv rrr,   abdomen soft, no tender  ext warm                       9.9    5.40  )-----------( 129      ( 17 Nov 2021 06:26 )             33.3       11-17    136  |  105  |  8   ----------------------------<  105<H>  3.8   |  25  |  0.70    Ca    8.3<L>      17 Nov 2021 06:26  Phos  2.6     11-17  Mg     2.1     11-17    TPro  7.3  /  Alb  3.3  /  TBili  0.9  /  DBili  x   /  AST  178<H>  /  ALT  97<H>  /  AlkPhos  88  11-17      CARDIAC MARKERS ( 16 Nov 2021 15:56 )  x     / x     / 97 U/L / x     / x        DVT Prophylaxis: Ambulating                                                               Advanced Directives: full code

## 2021-11-17 NOTE — DIETITIAN INITIAL EVALUATION ADULT. - OTHER INFO
42yo male comes to the ED from cardiology office. Pt was being seen because he has been fatigued. Pt with hx of PPM placed in April. Pt has been drinking alcohol since. Pt needs a generator change. Pt states that last drink was last night. Pt admits to drinking 10-12 drinks of beer/liquor per day. Currently NPO for EPS today. Prior to NPO status as per RN pt consuming DASH/TLC diet w/ >75% intake of meals. Pt lethargic and unable to provide information at this time. AICD at end of battery life. Will continue to monitor and follow up prn. See below for recommendations. Criteria not met for malnutrition however remains at high risk.

## 2021-11-17 NOTE — DIETITIAN INITIAL EVALUATION ADULT. - PERTINENT LABORATORY DATA
11-17    136  |  105  |  8   ----------------------------<  105<H>  3.8   |  25  |  0.70    Ca    8.3<L>      17 Nov 2021 06:26  Phos  2.6     11-17  Mg     2.1     11-17    TPro  7.3  /  Alb  3.3  /  TBili  0.9  /  DBili  x   /  AST  178<H>  /  ALT  97<H>  /  AlkPhos  88  11-17  BMI: BMI (kg/m2): 29.1 (11-16-21 @ 16:05)  HbA1c:   Glucose:   BP: 134/86 (11-17-21 @ 06:00) (112/71 - 138/82)  Lipid Panel:

## 2021-11-18 PROCEDURE — 99232 SBSQ HOSP IP/OBS MODERATE 35: CPT

## 2021-11-18 PROCEDURE — 99233 SBSQ HOSP IP/OBS HIGH 50: CPT

## 2021-11-18 PROCEDURE — 93010 ELECTROCARDIOGRAM REPORT: CPT

## 2021-11-18 PROCEDURE — 33264 RMVL & RPLCMT DFB GEN MLT LD: CPT

## 2021-11-18 RX ORDER — CEFAZOLIN SODIUM 1 G
2000 VIAL (EA) INJECTION ONCE
Refills: 0 | Status: DISCONTINUED | OUTPATIENT
Start: 2021-11-18 | End: 2021-11-19

## 2021-11-18 RX ORDER — CEFAZOLIN SODIUM 1 G
1000 VIAL (EA) INJECTION EVERY 8 HOURS
Refills: 0 | Status: COMPLETED | OUTPATIENT
Start: 2021-11-18 | End: 2021-11-19

## 2021-11-18 RX ADMIN — Medication 1000 MILLIGRAM(S): at 22:04

## 2021-11-18 RX ADMIN — Medication 50 MILLIGRAM(S): at 18:30

## 2021-11-18 RX ADMIN — Medication 50 MILLIGRAM(S): at 22:04

## 2021-11-18 RX ADMIN — Medication 100 MILLIGRAM(S): at 10:37

## 2021-11-18 RX ADMIN — Medication 50 MILLIGRAM(S): at 06:12

## 2021-11-18 RX ADMIN — CHLORHEXIDINE GLUCONATE 1 APPLICATION(S): 213 SOLUTION TOPICAL at 10:37

## 2021-11-18 NOTE — PACU DISCHARGE NOTE - COMMENTS
Report given to CCU by FARZAD Guo RN. . Patient A&Ox4, speech clear. V/S/S. Access site intact, no bleeding or hematoma. Patient placed on portable monitor, and transported with RN.

## 2021-11-18 NOTE — PROGRESS NOTE ADULT - SUBJECTIVE AND OBJECTIVE BOX
This is a 41 yr old male with PMHx of CHF, nonischemic dilated CM and prior EF 15% who had CRT-D implanted in 2014, HTN, inappropriate sinus tachycardia.  He had ICD shock last in March 2020 for inappropriate sinus tach and underwent EP study, no inducible arrhythmias.   His EF was improved at that time to 50-55%.  He has been non-compliant with follow up and medications, had change of insurance and has not seen cardiologist.  He came to outpatient EP clinic today for ICD interrogation and the device was found to be at [facility] since 4/2021.  He was sent to the ED for admission and he may have etoh withdrawal.    11/16/21:  TELE: sinus tachycardia with biventricular pacing.  Run of WCT 18 beats  EKG: sinus with BI-V pacing 88 bpm, , , QT/QTc 424/513    11/17/21:  pt seen eating lunch in bed, in NAD, VSS, on Librium   TELE: sinus with bi-v pacing 80-90 bpm, no further NSVT    11/18/21:  pt seen OOB washing up, feels well, in NAD. VSS on Librium taper  TELE: SR with bi-V pacing      MEDICATIONS  (STANDING):  chlordiazePOXIDE   Oral   chlordiazePOXIDE 50 milliGRAM(s) Oral every 8 hours  chlorhexidine 4% Liquid 1 Application(s) Topical <User Schedule>  influenza   Vaccine 0.5 milliLiter(s) IntraMuscular once  metoprolol tartrate 50 milliGRAM(s) Oral two times a day  thiamine Injectable 100 milliGRAM(s) IV Push daily    MEDICATIONS  (PRN):  acetaminophen     Tablet .. 650 milliGRAM(s) Oral every 6 hours PRN Mild Pain (1 - 3)  LORazepam   Injectable 2 milliGRAM(s) IV Push every 1 hour PRN Symptom-triggered: each CIWA -Ar score 8 or GREATER    Allergies    No Known Allergies      REVIEW OF SYSTEMS:    CONSTITUTIONAL: No weakness, fevers or chills, less anxious  EYES/ENT: No visual changes;  No vertigo or throat pain   NECK: No pain or stiffness  RESPIRATORY: No cough, wheezing, hemoptysis; No shortness of breath  CARDIOVASCULAR: No chest pain or palpitations  GASTROINTESTINAL: No abdominal or epigastric pain. No nausea, vomiting, or hematemesis; No diarrhea or constipation. No melena or hematochezia.  GENITOURINARY: No dysuria, frequency or hematuria  NEUROLOGICAL: No numbness or weakness  SKIN: No itching, burning, rashes, or lesions   All other review of systems is negative unless indicated above    Vital Signs Last 24 Hrs  T(C): 36.1 (18 Nov 2021 08:00), Max: 37.2 (18 Nov 2021 00:00)  T(F): 97 (18 Nov 2021 08:00), Max: 98.9 (18 Nov 2021 00:00)  HR: 94 (18 Nov 2021 10:00) (68 - 99)  BP: 101/69 (18 Nov 2021 10:00) (101/69 - 132/86)  BP(mean): 76 (18 Nov 2021 10:00) (72 - 98)  RR: 24 (18 Nov 2021 10:00) (12 - 24)  SpO2: 97% (18 Nov 2021 10:00) (96% - 99%)    PHYSICAL EXAM:    General: WN/WD NAD  Neurology: A&Ox3, nonfocal, ROJAS x 4  HEENT: NC/AT, neck supple, no JVD, EOMI, PERRL  Respiratory: CTA B/L  CV: RRR, S1S2, no murmurs, rubs or gallops  Abdominal: Soft, NT, ND +BS  Extremities: No edema, + peripheral pulses  Skin: No rashes    < from: TTE Echo Complete w/o Contrast w/ Doppler (11.16.21 @ 19:58) >     Impression     Summary     The mitral valve leaflets appear normal; there is no evidence of stenosis,   fluttering or prolapse.   Mild (1+) mitral regurgitation is present.   Normal aortic valve structure and function.   The tricuspid valve leaflets are thin and pliable; valve motion is normal.   Mild (1+) tricuspid valve regurgitation is present.   Normal appearing pulmonic valve structure and function.   The left atrium is mildly dilated.   Estimated left ventricular ejection fraction is 55 %.   Mild concentric left ventricular hypertrophy is present.   Left ventricular wall motion is normal.   The right ventricle is normal in size.   A device wire is seen in the RV and RA.   Pleural effusion cannot be ruled out.     Signature     ----------------------------------------------------------------   Electronically signed by Zulema Rocha MD(Interpreting

## 2021-11-18 NOTE — PROGRESS NOTE ADULT - SUBJECTIVE AND OBJECTIVE BOX
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      :  Pt seen and examined in CCU on IDR.  Awake and alert.  Calm.  Case d/w EP service.  For ICD generation change today.  No further VT.  Tm 98.8      PMH:  As above.     PSH:  As above.     FH: Non Contributory other than those listed in HPI    Social History:  As above    MEDICATIONS  (STANDING):  chlordiazePOXIDE   Oral   chlordiazePOXIDE 50 milliGRAM(s) Oral every 8 hours  chlorhexidine 4% Liquid 1 Application(s) Topical <User Schedule>  influenza   Vaccine 0.5 milliLiter(s) IntraMuscular once  metoprolol tartrate 50 milliGRAM(s) Oral two times a day  thiamine Injectable 100 milliGRAM(s) IV Push daily    MEDICATIONS  (PRN):  acetaminophen     Tablet .. 650 milliGRAM(s) Oral every 6 hours PRN Mild Pain (1 - 3)  LORazepam   Injectable 2 milliGRAM(s) IV Push every 1 hour PRN Symptom-triggered: each CIWA -Ar score 8 or GREATER      Allergies: NKDA    ROS:  SEE BELOW        ICU Vital Signs Last 24 Hrs  T(C): 36.1 (2021 08:00), Max: 37.2 (2021 00:00)  T(F): 97 (2021 08:00), Max: 98.9 (2021 00:00)  HR: 95 (2021 08:00) (68 - 99)  BP: 106/92 (2021 08:00) (103/64 - 132/86)  BP(mean): 96 (2021 08:00) (72 - 98)  ABP: --  ABP(mean): --  RR: 21 (2021 08:00) (12 - 24)  SpO2: 97% (2021 08:00) (96% - 99%)          I&O's Summary    2021 07:01  -  2021 07:00  --------------------------------------------------------  IN: 1060 mL / OUT: 725 mL / NET: 335 mL        Physical Exam:  SEE BELOW                          9.9    5.40  )-----------( 129      ( 2021 06:26 )             33.3           136  |  105  |  8   ----------------------------<  105<H>  3.8   |  25  |  0.70    Ca    8.3<L>      2021 06:26  Phos  2.6       Mg     2.1         TPro  7.3  /  Alb  3.3  /  TBili  0.9  /  DBili  x   /  AST  178<H>  /  ALT  97<H>  /  AlkPhos  88        CARDIAC MARKERS ( 2021 15:56 )  x     / x     / 97 U/L / x     / x                Urinalysis Basic - ( 2021 22:00 )    Color: Yellow / Appearance: Clear / S.005 / pH: x  Gluc: x / Ketone: Negative  / Bili: Negative / Urobili: 1 mg/dL   Blood: x / Protein: Negative mg/dL / Nitrite: Negative   Leuk Esterase: Negative / RBC: x / WBC x   Sq Epi: x / Non Sq Epi: x / Bacteria: x        DVT Prophylaxis:                                                            Contraindication:     Advanced Directives:    Discussed with:    Visit Information:  Time spent excluding procedure:      ** Time is exclusive of billed procedures and/or teaching and/or routine family updates.

## 2021-11-18 NOTE — PROCEDURE NOTE - GENERAL PROCEDURE DETAILS
successful CRTD generator replacement tyrex envelope used to cover the device. Normal lead testing, DDD  bpm, VT treated zone at 200 bpm atp, shocks x4; VF zone 240 bpm atp followed by shock x6

## 2021-11-18 NOTE — PACU DISCHARGE NOTE - COMMENTS
Pt tolerated well report given to Rick ccu pt verbalizes understanding of post op care Monitor paired with device by rep.  Education provided regarding home use.  Pt and/or family verbalizes understanding.  Device and ID card given to pt/family. handoff Archie

## 2021-11-19 ENCOUNTER — TRANSCRIPTION ENCOUNTER (OUTPATIENT)
Age: 41
End: 2021-11-19

## 2021-11-19 VITALS — HEART RATE: 81 BPM | RESPIRATION RATE: 15 BRPM

## 2021-11-19 PROCEDURE — 99232 SBSQ HOSP IP/OBS MODERATE 35: CPT

## 2021-11-19 RX ORDER — JNJ-78436735 50000000000 [PFU]/.5ML
0.5 SUSPENSION INTRAMUSCULAR
Qty: 0 | Refills: 0 | DISCHARGE

## 2021-11-19 RX ORDER — METOPROLOL TARTRATE 50 MG
1 TABLET ORAL
Qty: 60 | Refills: 3
Start: 2021-11-19 | End: 2022-03-18

## 2021-11-19 RX ORDER — METOPROLOL TARTRATE 50 MG
1 TABLET ORAL
Qty: 0 | Refills: 0 | DISCHARGE
Start: 2021-11-19

## 2021-11-19 RX ORDER — THIAMINE MONONITRATE (VIT B1) 100 MG
1 TABLET ORAL
Qty: 30 | Refills: 3
Start: 2021-11-19 | End: 2022-03-18

## 2021-11-19 RX ADMIN — Medication 50 MILLIGRAM(S): at 05:36

## 2021-11-19 RX ADMIN — Medication 50 MILLIGRAM(S): at 10:05

## 2021-11-19 RX ADMIN — Medication 1000 MILLIGRAM(S): at 05:36

## 2021-11-19 NOTE — PROGRESS NOTE ADULT - ASSESSMENT
41 yr old male with above history found to have ICD at [facility] since April 2021, sent from outpatient EP clinic to ER for admission and ICD generator change in a few days.  He may have etoh withdrawal.  NICM with improved EF    A/P:  AICD at end of service  Continuous tele monitoring  Continue beta blocker.  Keep defib pads on patient  Keep labs repleted K>4, Mg>2  Repeat echo reveals normal LVEF 55%  NPO for ICD generator change today, possible discharge after procedure.  Informed consent obtained, risks, benefits, alternatives to procedure, pt understands and wishes to proceed.  Discussed with Dr. Mcguire, patient, RN  
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    
41 yr old male with above history found to have ICD at [facility] since April 2021, sent from outpatient EP clinic to ER for admission and ICD generator change in a few days.  He may have etoh withdrawal.      A/P:  AICD at end of service  Continuous tele monitoring  Discontinue procainamide infusion.  Continue beta blocker.  Keep defib pads on patient  Repeat echo reveals normal LVEF 55%  NPO after MN tonight for ICD generator change tomorrow.  Discussed with Dr. Mcguire, patient, RN
Patient hx. Cardiomyopathy  admitted for PPM for generator change  had 18 beats vt, started on procainamide, by EP  generator change tomorrow  on librium taper for Possible alcohol withdrawal feels less tremulous
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

## 2021-11-19 NOTE — DISCHARGE NOTE PROVIDER - CARE PROVIDERS DIRECT ADDRESSES
[email or web address]

## 2021-11-19 NOTE — DISCHARGE NOTE PROVIDER - HOSPITAL COURSE
This is a 41 yr old male with PMHx of CHF, nonischemic dilated CM and prior EF 15% who had CRT-D implanted in 2014, HTN, inappropriate sinus tachycardia.  He had ICD shock last in March 2020 for inappropriate sinus tach and underwent EP study, no inducible arrhythmias.   His EF was improved at that time to 50-55%.  He has been non-compliant with follow up and medications, had change of insurance and has not seen cardiologist.  He came to outpatient EP clinic today for ICD interrogation and the device was found to be at [facility] since 4/2021.  He was sent to the ED for admission and he may have etoh withdrawal.    11/16/21:  TELE: sinus tachycardia with biventricular pacing.  Run of WCT 18 beats  EKG: sinus with BI-V pacing 88 bpm, , , QT/QTc 424/513    11/17/21:  pt seen eating lunch in bed, in NAD, VSS, on Librium   TELE: sinus with bi-v pacing 80-90 bpm, no further NSVT    11/18/21:  pt seen OOB washing up, feels well, in NAD. VSS on Librium taper  TELE: SR with bi-V pacing    11/19/21:  s/p CRT-D generator change, POD #1  TELE: sinus with Bi-V pacing 80-90 bpm    MEDICATIONS  (STANDING):  ceFAZolin   IVPB 2000 milliGRAM(s) IV Intermittent once  chlordiazePOXIDE   Oral   chlordiazePOXIDE 50 milliGRAM(s) Oral every 12 hours  chlordiazePOXIDE 50 milliGRAM(s) Oral once  chlorhexidine 4% Liquid 1 Application(s) Topical <User Schedule>  influenza   Vaccine 0.5 milliLiter(s) IntraMuscular once  metoprolol tartrate 50 milliGRAM(s) Oral two times a day  thiamine Injectable 100 milliGRAM(s) IV Push daily    MEDICATIONS  (PRN):  acetaminophen     Tablet .. 650 milliGRAM(s) Oral every 6 hours PRN Mild Pain (1 - 3)  LORazepam   Injectable 2 milliGRAM(s) IV Push every 1 hour PRN Symptom-triggered: each CIWA -Ar score 8 or GREATER    Allergies    No Known Allergies    REVIEW OF SYSTEMS:    CONSTITUTIONAL: No weakness, fevers or chills  EYES/ENT: No visual changes;  No vertigo or throat pain   NECK: No pain or stiffness  RESPIRATORY: No cough, wheezing, hemoptysis; No shortness of breath  CARDIOVASCULAR: No chest pain or palpitations  GASTROINTESTINAL: No abdominal or epigastric pain. No nausea, vomiting, or hematemesis; No diarrhea or constipation. No melena or hematochezia.  GENITOURINARY: No dysuria, frequency or hematuria  NEUROLOGICAL: No numbness or weakness  SKIN: No itching, burning, rashes, or lesions   All other review of systems is negative unless indicated above    Vital Signs Last 24 Hrs  T(C): 36.7 (19 Nov 2021 09:06), Max: 36.9 (18 Nov 2021 13:00)  T(F): 98 (19 Nov 2021 09:06), Max: 98.4 (18 Nov 2021 13:00)  HR: 76 (19 Nov 2021 08:00) (71 - 94)  BP: 123/74 (19 Nov 2021 08:00) (101/69 - 129/100)  BP(mean): 86 (19 Nov 2021 08:00) (73 - 108)  RR: 18 (19 Nov 2021 08:00) (14 - 24)  SpO2: 96% (19 Nov 2021 06:00) (89% - 100%)    PHYSICAL EXAM:    General: WN/WD NAD  Neurology: A&Ox3, nonfocal, ROJAS x 4  HEENT: NC/AT, neck supple, no JVD, EOMI, PERRL  Respiratory: CTA B/L  CV: RRR, S1S2, no murmurs, rubs or gallops  Abdominal: Soft, NT, ND +BS  Extremities: No edema, + peripheral pulses  Skin: No rashes.  Left chest wall incision site with Prineo dressing, dry, intact, no bleeding, drainage or hematoma.    < from: TTE Echo Complete w/o Contrast w/ Doppler (11.16.21 @ 19:58) >   Impression   Summary   The mitral valve leaflets appear normal; there is no evidence of stenosis,   fluttering or prolapse.   Mild (1+) mitral regurgitation is present.   Normal aortic valve structure and function.   The tricuspid valve leaflets are thin and pliable; valve motion is normal.   Mild (1+) tricuspid valve regurgitation is present.   Normal appearing pulmonic valve structure and function.   The left atrium is mildly dilated.   Estimated left ventricular ejection fraction is 55 %.   Mild concentric left ventricular hypertrophy is present.   Left ventricular wall motion is normal.   The right ventricle is normal in size.   A device wire is seen in the RV and RA.   Pleural effusion cannot be ruled out.      41 yr old male with above history found to have ICD at [facility] since April 2021, sent from outpatient EP clinic to ER for admission and ICD generator change in a few days.  He may have etoh withdrawal.  NICM with improved EF    A/P: NICM, s/p CRT-D generator change POD #1.  Post op instructions reviewed with patient.  Follow up as outpatient in EP clinic on 12/1/21.  Encouraged to abstain from alcohol,  Educated on compliance of taking his medications and routine Device follow up.  Stable for discharge home.       This is a 41 yr old male with PMHx of CHF, nonischemic dilated CM and prior EF 15% who had CRT-D implanted in 2014, HTN, inappropriate sinus tachycardia.  He had ICD shock last in March 2020 for inappropriate sinus tach and underwent EP study, no inducible arrhythmias.   His EF was improved at that time to 50-55%.  He has been non-compliant with follow up and medications, had change of insurance and has not seen cardiologist.  He came to outpatient EP clinic today for ICD interrogation and the device was found to be at [facility] since 4/2021.  He was sent to the ED for admission and he may have etoh withdrawal.    11/16/21:  TELE: sinus tachycardia with biventricular pacing.  Run of WCT 18 beats  EKG: sinus with BI-V pacing 88 bpm, , , QT/QTc 424/513    11/17/21:  pt seen eating lunch in bed, in NAD, VSS, on Librium   TELE: sinus with bi-v pacing 80-90 bpm, no further NSVT    11/18/21:  pt seen OOB washing up, feels well, in NAD. VSS on Librium taper  TELE: SR with bi-V pacing    11/19/21:  s/p CRT-D generator change, POD #1  TELE: sinus with Bi-V pacing 80-90 bpm    MEDICATIONS  (STANDING):  ceFAZolin   IVPB 2000 milliGRAM(s) IV Intermittent once  chlordiazePOXIDE   Oral   chlordiazePOXIDE 50 milliGRAM(s) Oral every 12 hours  chlordiazePOXIDE 50 milliGRAM(s) Oral once  chlorhexidine 4% Liquid 1 Application(s) Topical <User Schedule>  influenza   Vaccine 0.5 milliLiter(s) IntraMuscular once  metoprolol tartrate 50 milliGRAM(s) Oral two times a day  thiamine Injectable 100 milliGRAM(s) IV Push daily    MEDICATIONS  (PRN):  acetaminophen     Tablet .. 650 milliGRAM(s) Oral every 6 hours PRN Mild Pain (1 - 3)  LORazepam   Injectable 2 milliGRAM(s) IV Push every 1 hour PRN Symptom-triggered: each CIWA -Ar score 8 or GREATER    Allergies    No Known Allergies    REVIEW OF SYSTEMS:    CONSTITUTIONAL: No weakness, fevers or chills  EYES/ENT: No visual changes;  No vertigo or throat pain   NECK: No pain or stiffness  RESPIRATORY: No cough, wheezing, hemoptysis; No shortness of breath  CARDIOVASCULAR: No chest pain or palpitations  GASTROINTESTINAL: No abdominal or epigastric pain. No nausea, vomiting, or hematemesis; No diarrhea or constipation. No melena or hematochezia.  GENITOURINARY: No dysuria, frequency or hematuria  NEUROLOGICAL: No numbness or weakness  SKIN: No itching, burning, rashes, or lesions   All other review of systems is negative unless indicated above    Vital Signs Last 24 Hrs  T(C): 36.7 (19 Nov 2021 09:06), Max: 36.9 (18 Nov 2021 13:00)  T(F): 98 (19 Nov 2021 09:06), Max: 98.4 (18 Nov 2021 13:00)  HR: 76 (19 Nov 2021 08:00) (71 - 94)  BP: 123/74 (19 Nov 2021 08:00) (101/69 - 129/100)  BP(mean): 86 (19 Nov 2021 08:00) (73 - 108)  RR: 18 (19 Nov 2021 08:00) (14 - 24)  SpO2: 96% (19 Nov 2021 06:00) (89% - 100%)    PHYSICAL EXAM:    General: WN/WD NAD  Neurology: A&Ox3, nonfocal, ROJAS x 4  HEENT: NC/AT, neck supple, no JVD, EOMI, PERRL  Respiratory: CTA B/L  CV: RRR, S1S2, no murmurs, rubs or gallops  Abdominal: Soft, NT, ND +BS  Extremities: No edema, + peripheral pulses  Skin: No rashes.  Left chest wall incision site with Prineo dressing, dry, intact, no bleeding, drainage or hematoma.    < from: TTE Echo Complete w/o Contrast w/ Doppler (11.16.21 @ 19:58) >   Impression   Summary   The mitral valve leaflets appear normal; there is no evidence of stenosis,   fluttering or prolapse.   Mild (1+) mitral regurgitation is present.   Normal aortic valve structure and function.   The tricuspid valve leaflets are thin and pliable; valve motion is normal.   Mild (1+) tricuspid valve regurgitation is present.   Normal appearing pulmonic valve structure and function.   The left atrium is mildly dilated.   Estimated left ventricular ejection fraction is 55 %.   Mild concentric left ventricular hypertrophy is present.   Left ventricular wall motion is normal.   The right ventricle is normal in size.   A device wire is seen in the RV and RA.   Pleural effusion cannot be ruled out.      41 yr old male with above history found to have ICD at [facility] since April 2021, sent from outpatient EP clinic to ER for admission and ICD generator change in a few days.  He may have etoh withdrawal.  NICM with improved EF    A/P: NICM, s/p CRT-D generator change POD #1.  Post op instructions reviewed with patient.  Follow up as outpatient in EP clinic on 12/1/21.  Encouraged to abstain from alcohol,  Educated on compliance of taking his medications and routine Device follow up.  Will ask  to speak with patient regarding outpatient services such as AA.  Stable for discharge home.

## 2021-11-19 NOTE — SBIRT NOTE ADULT - NSSBIRTALCPASSREFTXDET_GEN_A_CORE
pt was in a rush to leave, being DC'd today, says  he wants inpt program; SW provided written information for him to follow up with once home. provided contact information for him to call SW if he has any questions or needs further assistance

## 2021-11-19 NOTE — DISCHARGE NOTE PROVIDER - CARE PROVIDER_API CALL
Kostas Mcguire)  Cardiac Electrophysiology; Cardiovascular Disease; Internal Medicine  [address] [place] [address]  Phone: [phone]  Fax: [phone]  Follow Up Time:

## 2021-11-19 NOTE — DISCHARGE NOTE PROVIDER - NSDCMRMEDTOKEN_GEN_ALL_CORE_FT
metoprolol tartrate 50 mg oral tablet: 1 tab(s) orally 2 times a day   metoprolol tartrate 50 mg oral tablet: 1 tab(s) orally 2 times a day  thiamine 50 mg oral tablet: 1 tab(s) orally once a day

## 2021-11-19 NOTE — DISCHARGE NOTE PROVIDER - NSDCCPCAREPLAN_GEN_ALL_CORE_FT
PRINCIPAL DISCHARGE DIAGNOSIS  Diagnosis: Ventricular tachycardia  Assessment and Plan of Treatment: s/p AICD generator change for end of service      SECONDARY DISCHARGE DIAGNOSES  Diagnosis: AICD at end of battery life  Assessment and Plan of Treatment:

## 2021-11-19 NOTE — DISCHARGE NOTE NURSING/CASE MANAGEMENT/SOCIAL WORK - PATIENT PORTAL LINK FT
You can access the FollowMyHealth Patient Portal offered by [facility] by registering at the following website: [email or web address]. By joining [employer]’s FollowMyHealth portal, you will also be able to view your health information using other applications (apps) compatible with our system.

## 2021-11-19 NOTE — PROGRESS NOTE ADULT - SUBJECTIVE AND OBJECTIVE BOX
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      :  Pt seen and examined in CCU on IDR.  Awake and alert.  Calm.  Case d/w EP service.  For ICD generation change today.  No further VT.  Tm 98.8    :  Pt seen and examined in CCU during IDR.  POD # 1 S/P ICD generator change.  No events overnight.  Awake and alert.  No evidence of WD.  Stable for DC home    PMH:  As above.     PSH:  As above.     FH: Non Contributory other than those listed in HPI    Social History:  As above    MEDICATIONS  (STANDING):  ceFAZolin   IVPB 2000 milliGRAM(s) IV Intermittent once  chlordiazePOXIDE   Oral   chlordiazePOXIDE 50 milliGRAM(s) Oral every 12 hours  chlordiazePOXIDE 50 milliGRAM(s) Oral once  chlorhexidine 4% Liquid 1 Application(s) Topical <User Schedule>  influenza   Vaccine 0.5 milliLiter(s) IntraMuscular once  metoprolol tartrate 50 milliGRAM(s) Oral two times a day  thiamine Injectable 100 milliGRAM(s) IV Push daily    MEDICATIONS  (PRN):  acetaminophen     Tablet .. 650 milliGRAM(s) Oral every 6 hours PRN Mild Pain (1 - 3)  LORazepam   Injectable 2 milliGRAM(s) IV Push every 1 hour PRN Symptom-triggered: each CIWA -Ar score 8 or GREATER      Allergies: NKDA    ROS:  SEE BELOW    Height (cm): 170.2 ( @ 14:01)  Weight (kg): 85 ( @ 14:01)  BMI (kg/m2): 29.3 ( @ 14:01)    ICU Vital Signs Last 24 Hrs  T(C): 36.7 (2021 09:06), Max: 36.9 (2021 13:00)  T(F): 98 (2021 09:06), Max: 98.4 (2021 13:00)  HR: 76 (2021 08:00) (71 - 94)  BP: 123/74 (2021 08:00) (105/67 - 129/100)  BP(mean): 86 (2021 08:00) (73 - 108)  ABP: --  ABP(mean): --  RR: 18 (2021 08:00) (14 - 22)  SpO2: 96% (2021 06:00) (89% - 100%)          I&O's Summary    2021 07:01  -  2021 07:00  --------------------------------------------------------  IN: 240 mL / OUT: 0 mL / NET: 240 mL        Physical Exam:  SEE BELOW                          Urinalysis Basic - ( 2021 22:00 )    Color: Yellow / Appearance: Clear / S.005 / pH: x  Gluc: x / Ketone: Negative  / Bili: Negative / Urobili: 1 mg/dL   Blood: x / Protein: Negative mg/dL / Nitrite: Negative   Leuk Esterase: Negative / RBC: x / WBC x   Sq Epi: x / Non Sq Epi: x / Bacteria: x        DVT Prophylaxis:                                                            Contraindication:     Advanced Directives:    Discussed with:    Visit Information:  Time spent excluding procedure:      ** Time is exclusive of billed procedures and/or teaching and/or routine family updates.

## 2021-11-22 DIAGNOSIS — Z91.14 PATIENT'S OTHER NONCOMPLIANCE WITH MEDICATION REGIMEN: ICD-10-CM

## 2021-11-22 DIAGNOSIS — Z79.82 LONG TERM (CURRENT) USE OF ASPIRIN: ICD-10-CM

## 2021-11-22 DIAGNOSIS — I50.9 HEART FAILURE, UNSPECIFIED: ICD-10-CM

## 2021-11-22 DIAGNOSIS — I11.0 HYPERTENSIVE HEART DISEASE WITH HEART FAILURE: ICD-10-CM

## 2021-11-22 DIAGNOSIS — F10.239 ALCOHOL DEPENDENCE WITH WITHDRAWAL, UNSPECIFIED: ICD-10-CM

## 2021-11-22 DIAGNOSIS — Z91.19 PATIENT'S NONCOMPLIANCE WITH OTHER MEDICAL TREATMENT AND REGIMEN: ICD-10-CM

## 2021-11-22 DIAGNOSIS — I47.2 VENTRICULAR TACHYCARDIA: ICD-10-CM

## 2021-11-22 DIAGNOSIS — Z45.02 ENCOUNTER FOR ADJUSTMENT AND MANAGEMENT OF AUTOMATIC IMPLANTABLE CARDIAC DEFIBRILLATOR: ICD-10-CM

## 2021-11-22 DIAGNOSIS — E51.9 THIAMINE DEFICIENCY, UNSPECIFIED: ICD-10-CM

## 2021-11-22 DIAGNOSIS — I42.0 DILATED CARDIOMYOPATHY: ICD-10-CM

## 2021-11-22 DIAGNOSIS — I44.7 LEFT BUNDLE-BRANCH BLOCK, UNSPECIFIED: ICD-10-CM

## 2021-11-26 NOTE — ED PROVIDER NOTE - CARE PROVIDER_API CALL
Patient/Caregiver provided printed discharge information. Irvin Dill)  Cardiology  [address] [place] [address]  Phone: [phone]  Fax: [phone]  Follow Up Time:    Irvin Dill)  Cardiology  [address] [place] [address]  Phone: [phone]  Fax: [phone]  Follow Up Time:     Irvin Bazan)  Gastroenterology; Internal Medicine  [address] [place] [address]  Phone: [phone]  Fax: [phone]  Follow Up Time:

## 2021-12-07 ENCOUNTER — APPOINTMENT (OUTPATIENT)
Dept: ELECTROPHYSIOLOGY | Facility: CLINIC | Age: 41
End: 2021-12-07
Payer: COMMERCIAL

## 2021-12-07 ENCOUNTER — EMERGENCY (EMERGENCY)
Facility: HOSPITAL | Age: 41
LOS: 0 days | Discharge: OTHER INSTITUTION | End: 2021-12-08
Attending: EMERGENCY MEDICINE
Payer: COMMERCIAL

## 2021-12-07 ENCOUNTER — NON-APPOINTMENT (OUTPATIENT)
Age: 41
End: 2021-12-07

## 2021-12-07 VITALS
SYSTOLIC BLOOD PRESSURE: 147 MMHG | RESPIRATION RATE: 20 BRPM | HEIGHT: 68 IN | DIASTOLIC BLOOD PRESSURE: 104 MMHG | OXYGEN SATURATION: 99 % | HEART RATE: 108 BPM

## 2021-12-07 VITALS
TEMPERATURE: 97 F | RESPIRATION RATE: 18 BRPM | DIASTOLIC BLOOD PRESSURE: 100 MMHG | OXYGEN SATURATION: 100 % | SYSTOLIC BLOOD PRESSURE: 147 MMHG | HEART RATE: 100 BPM

## 2021-12-07 DIAGNOSIS — Z95.810 PRESENCE OF AUTOMATIC (IMPLANTABLE) CARDIAC DEFIBRILLATOR: ICD-10-CM

## 2021-12-07 DIAGNOSIS — Z20.822 CONTACT WITH AND (SUSPECTED) EXPOSURE TO COVID-19: ICD-10-CM
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
## 2021-12-07 DIAGNOSIS — I25.5 ISCHEMIC CARDIOMYOPATHY: ICD-10-CM

## 2021-12-07 DIAGNOSIS — R25.1 TREMOR, UNSPECIFIED: ICD-10-CM

## 2021-12-07 DIAGNOSIS — F10.230 ALCOHOL DEPENDENCE WITH WITHDRAWAL, UNCOMPLICATED: ICD-10-CM

## 2021-12-07 DIAGNOSIS — I10 ESSENTIAL (PRIMARY) HYPERTENSION: ICD-10-CM

## 2021-12-07 LAB
ALBUMIN SERPL ELPH-MCNC: 3.4 G/DL — SIGNIFICANT CHANGE UP (ref 3.3–5)
ALP SERPL-CCNC: 95 U/L — SIGNIFICANT CHANGE UP (ref 40–120)
ALT FLD-CCNC: 69 U/L — SIGNIFICANT CHANGE UP (ref 12–78)
ANION GAP SERPL CALC-SCNC: 7 MMOL/L — SIGNIFICANT CHANGE UP (ref 5–17)
AST SERPL-CCNC: 158 U/L — HIGH (ref 15–37)
BASOPHILS # BLD AUTO: 0.05 K/UL — SIGNIFICANT CHANGE UP (ref 0–0.2)
BASOPHILS NFR BLD AUTO: 0.8 % — SIGNIFICANT CHANGE UP (ref 0–2)
BILIRUB SERPL-MCNC: 0.9 MG/DL — SIGNIFICANT CHANGE UP (ref 0.2–1.2)
BUN SERPL-MCNC: 8 MG/DL — SIGNIFICANT CHANGE UP (ref 7–23)
CALCIUM SERPL-MCNC: 7.9 MG/DL — LOW (ref 8.5–10.1)
CHLORIDE SERPL-SCNC: 109 MMOL/L — HIGH (ref 96–108)
CO2 SERPL-SCNC: 25 MMOL/L — SIGNIFICANT CHANGE UP (ref 22–31)
CREAT SERPL-MCNC: 0.66 MG/DL — SIGNIFICANT CHANGE UP (ref 0.5–1.3)
EOSINOPHIL # BLD AUTO: 0.1 K/UL — SIGNIFICANT CHANGE UP (ref 0–0.5)
EOSINOPHIL NFR BLD AUTO: 1.6 % — SIGNIFICANT CHANGE UP (ref 0–6)
ETHANOL SERPL-MCNC: <10 MG/DL — SIGNIFICANT CHANGE UP (ref 0–10)
GLUCOSE SERPL-MCNC: 108 MG/DL — HIGH (ref 70–99)
HCT VFR BLD CALC: 31.8 % — LOW (ref 39–50)
HGB BLD-MCNC: 9.8 G/DL — LOW (ref 13–17)
IMM GRANULOCYTES NFR BLD AUTO: 0.3 % — SIGNIFICANT CHANGE UP (ref 0–1.5)
LYMPHOCYTES # BLD AUTO: 1.35 K/UL — SIGNIFICANT CHANGE UP (ref 1–3.3)
LYMPHOCYTES # BLD AUTO: 21.7 % — SIGNIFICANT CHANGE UP (ref 13–44)
MAGNESIUM SERPL-MCNC: 1.7 MG/DL — SIGNIFICANT CHANGE UP (ref 1.6–2.6)
MCHC RBC-ENTMCNC: 23.7 PG — LOW (ref 27–34)
MCHC RBC-ENTMCNC: 30.8 GM/DL — LOW (ref 32–36)
MCV RBC AUTO: 77 FL — LOW (ref 80–100)
MONOCYTES # BLD AUTO: 0.56 K/UL — SIGNIFICANT CHANGE UP (ref 0–0.9)
MONOCYTES NFR BLD AUTO: 9 % — SIGNIFICANT CHANGE UP (ref 2–14)
NEUTROPHILS # BLD AUTO: 4.15 K/UL — SIGNIFICANT CHANGE UP (ref 1.8–7.4)
NEUTROPHILS NFR BLD AUTO: 66.6 % — SIGNIFICANT CHANGE UP (ref 43–77)
PLATELET # BLD AUTO: 105 K/UL — LOW (ref 150–400)
POTASSIUM SERPL-MCNC: 3.8 MMOL/L — SIGNIFICANT CHANGE UP (ref 3.5–5.3)
POTASSIUM SERPL-SCNC: 3.8 MMOL/L — SIGNIFICANT CHANGE UP (ref 3.5–5.3)
PROT SERPL-MCNC: 7.8 GM/DL — SIGNIFICANT CHANGE UP (ref 6–8.3)
RBC # BLD: 4.13 M/UL — LOW (ref 4.2–5.8)
RBC # FLD: 16.9 % — HIGH (ref 10.3–14.5)
SARS-COV-2 RNA SPEC QL NAA+PROBE: SIGNIFICANT CHANGE UP
SODIUM SERPL-SCNC: 141 MMOL/L — SIGNIFICANT CHANGE UP (ref 135–145)
WBC # BLD: 6.23 K/UL — SIGNIFICANT CHANGE UP (ref 3.8–10.5)
WBC # FLD AUTO: 6.23 K/UL — SIGNIFICANT CHANGE UP (ref 3.8–10.5)

## 2021-12-07 PROCEDURE — 85025 COMPLETE CBC W/AUTO DIFF WBC: CPT

## 2021-12-07 PROCEDURE — 80053 COMPREHEN METABOLIC PANEL: CPT

## 2021-12-07 PROCEDURE — 99285 EMERGENCY DEPT VISIT HI MDM: CPT | Mod: 25

## 2021-12-07 PROCEDURE — 93005 ELECTROCARDIOGRAM TRACING: CPT

## 2021-12-07 PROCEDURE — 93284 PRGRMG EVAL IMPLANTABLE DFB: CPT

## 2021-12-07 PROCEDURE — U0005: CPT

## 2021-12-07 PROCEDURE — U0003: CPT

## 2021-12-07 PROCEDURE — 80307 DRUG TEST PRSMV CHEM ANLYZR: CPT

## 2021-12-07 PROCEDURE — 70450 CT HEAD/BRAIN W/O DYE: CPT | Mod: 26,MA

## 2021-12-07 PROCEDURE — 96376 TX/PRO/DX INJ SAME DRUG ADON: CPT

## 2021-12-07 PROCEDURE — 99024 POSTOP FOLLOW-UP VISIT: CPT

## 2021-12-07 PROCEDURE — 83735 ASSAY OF MAGNESIUM: CPT

## 2021-12-07 PROCEDURE — 36415 COLL VENOUS BLD VENIPUNCTURE: CPT

## 2021-12-07 PROCEDURE — 93010 ELECTROCARDIOGRAM REPORT: CPT

## 2021-12-07 PROCEDURE — 70450 CT HEAD/BRAIN W/O DYE: CPT | Mod: MA

## 2021-12-07 PROCEDURE — 96374 THER/PROPH/DIAG INJ IV PUSH: CPT

## 2021-12-07 PROCEDURE — 99285 EMERGENCY DEPT VISIT HI MDM: CPT

## 2021-12-07 RX ORDER — SODIUM CHLORIDE 9 MG/ML
1000 INJECTION INTRAMUSCULAR; INTRAVENOUS; SUBCUTANEOUS ONCE
Refills: 0 | Status: COMPLETED | OUTPATIENT
Start: 2021-12-07 | End: 2021-12-07

## 2021-12-07 RX ORDER — FOLIC ACID 0.8 MG
1 TABLET ORAL ONCE
Refills: 0 | Status: COMPLETED | OUTPATIENT
Start: 2021-12-07 | End: 2021-12-07

## 2021-12-07 RX ORDER — THIAMINE MONONITRATE (VIT B1) 100 MG
100 TABLET ORAL ONCE
Refills: 0 | Status: COMPLETED | OUTPATIENT
Start: 2021-12-07 | End: 2021-12-07

## 2021-12-07 RX ADMIN — SODIUM CHLORIDE 1000 MILLILITER(S): 9 INJECTION INTRAMUSCULAR; INTRAVENOUS; SUBCUTANEOUS at 18:02

## 2021-12-07 RX ADMIN — Medication 2 MILLIGRAM(S): at 17:59

## 2021-12-07 RX ADMIN — Medication 100 MILLIGRAM(S): at 19:35

## 2021-12-07 RX ADMIN — Medication 1 TABLET(S): at 17:59

## 2021-12-07 RX ADMIN — Medication 2 MILLIGRAM(S): at 22:39

## 2021-12-07 RX ADMIN — Medication 1 MILLIGRAM(S): at 17:59

## 2021-12-07 NOTE — ED PROVIDER NOTE - PHYSICAL EXAMINATION
Gen: Well appearing. A&O x3. tremulous  HEENT: NC/AT. +tongue fasiculations. Nares patent.  Cardiac: s1s2, RRR.  Lungs: CTAB, no chest wall tenderness.  Abdomen: NBS x4, soft, nontender. No CVAT.  MSK: No obvious deformity to extremities. No midline spinal tenderness or tenderness over bony landmarks on extremities. 5/5 upper and lower extremity strength. Full ROM in all extremities  Neuro: CN II-XII intact. Sensation intact to light touch in all extremities. 5/5 strength in all extremities. Point to point intact.  PV: No LE edema or calf tenderness. Distal pulses 2+ in all extremities.

## 2021-12-07 NOTE — ED PROVIDER NOTE - OBJECTIVE STATEMENT
42 y/o M with PMH of PPM, HTN presents with concern for ETOH withdrawal. Pt was at cardiology appointment today, found to be tremulous and send to ED. Pt admits he did not drink alcohol today, last drink was yesterday. States he has apx 12 drinks per day. Pt states he would like to go through detox at outside facility. Admits he has difficulty with cessation of alcohol due to friends buying him drinks regularly. Believes he may have had seizure yesterday, but is not sure. States he did not have urinary incontinence or bite his tongue.

## 2021-12-07 NOTE — ED ADULT NURSE REASSESSMENT NOTE - NS ED NURSE REASSESS COMMENT FT1
care assumed from ADRIANA Kramer. chart reviewed, no distress noted, VS as charted. Safety maintained.

## 2021-12-07 NOTE — ED ADULT NURSE NOTE - INTERVENTIONS DEFINITIONS
Stretcher in lowest position, wheels locked, appropriate side rails in place/Provide visual cue, wrist band, yellow gown, etc./Review medications for side effects contributing to fall risk

## 2021-12-07 NOTE — SBIRT NOTE ADULT - NSSBIRTUNABLEREM_GEN_A_CORE
Current patient class: Observation  The patient is currently on Hospital Day: 2      The patient was admitted to the hospital at N/A on N/A for the following diagnosis:  Unstable angina ([address] ) [I20 0]  Chest pain [R07 9]       There is documentation in the medical record of an expected length of stay of at least 2 midnights  The patient is therefore expected to satisfy the 2 midnight benchmark and given the 2 midnight presumption is appropriate for INPATIENT ADMISSION  Given this expectation of a satisfying stay, CMS instructs us that the patient is most often appropriate for inpatient admission under part A provided medical necessity is documented in the chart  After review of the relevant documentation, labs, vital signs and test results, the patient is appropriate for INPATIENT ADMISSION  Admission to the hospital as an inpatient is a complex decision making process which requires the practitioner to consider the patients presenting complaint, history and physical examination and all relevant testing  With this in mind, in this case, the patient was deemed appropriate for INPATIENT ADMISSION  After review of the documentation and testing available at the time of the admission I concur with this clinical determination of medical necessity  Rationale is as follows:     The patient is a 52 yrs old Male who presented to the ED at 10/26/2017  8:27 AM with a chief complaint of Chest Pain (Pt states that he started with chest pain yesterday that is across the top of his chest  Pt denies any cardiac hx )    The patients vitals on arrival were ED Triage Vitals [10/26/17 0831]   Temperature Pulse Respirations Blood Pressure SpO2   98 2 °F (36 8 °C) 58 16 137/68 98 %      Temp Source Heart Rate Source Patient Position - Orthostatic VS BP Location FiO2 (%)   Oral Monitor Sitting Right arm --      Pain Score       Worst Possible Pain           Past Medical History:   Diagnosis Date    Irregular heart beat History reviewed  No pertinent surgical history  Consults have been placed to:   IP CONSULT TO CARDIOLOGY    Vitals:    10/27/17 1215 10/27/17 1315 10/27/17 1800 10/27/17 2200   BP: 138/72 137/77 130/77 123/67   Pulse: (!) 49 63 60 60   Resp: 18 18 18 19   Temp:   97 7 °F (36 5 °C) 98 °F (36 7 °C)   TempSrc:   Oral Oral   SpO2: (!) 10% 99% 99% 98%   Weight:       Height:           Most recent labs:    Recent Labs      10/26/17   0842   10/26/17   1454   10/26/17   2010   WBC  8 00   < >  8 50   --    --    HGB  15 2   < >  14 9   --    --    HCT  45 1   < >  44 6   --    --    PLT  281   < >  268   --    --    K  4 3   --   3 7   --    --    NA  139   --   140   --    --    CALCIUM  9 1   --   9 0   --    --    BUN  17   --   14   --    --    CREATININE  0 94   --   1 00   --    --    INR   --    < >  1 09   --    --    TROPONINI  <0 02   --   <0 02   < >  <0 02   AST  29   --    --    --    --    ALT  47   --    --    --    --    ALKPHOS  94   --    --    --    --    BILITOT  0 40   --    --    --    --     < > = values in this interval not displayed         Scheduled Meds:  [START ON 10/28/2017] aspirin 81 mg Oral Daily   atorvastatin 40 mg Oral Daily With Dinner   nicotine 1 patch Transdermal Daily   ticagrelor 90 mg Oral Q12H [address]     Continuous Infusions:   PRN Meds:   acetaminophen    aluminum-magnesium hydroxide-simethicone    nitroglycerin    ondansetron    Surgical procedures (if appropriate): Daily or almost daily

## 2021-12-07 NOTE — ED ADULT NURSE NOTE - OBJECTIVE STATEMENT
patient was at cardiology appointment today, noted to be tremulous.  he states he wants to detox.  he has been drinking vodka daily, 8 drinks of vodka a day.  Admits to using cocaine occasionally, last time one month ago.  he has a PPM, battery changed one month ago.

## 2021-12-07 NOTE — ED ADULT TRIAGE NOTE - CHIEF COMPLAINT QUOTE
pt presents to Ed with complaints of ETOH withdrawal. pt presented to cardiology department for routine follow up appointment and was brought to ED by staff for symptoms of ETOH withdrawal. pt reports drinking 8+ vodka drinks a day with last drink yesterday.

## 2021-12-07 NOTE — ED PROVIDER NOTE - PROGRESS NOTE DETAILS
D/w Adia from SBIRT who completed interview with patient. States she will contact rehab facilities which she admits can take multiple days. Also recommended discussion with [facility] for transfer for detox. - Bijan Miller PA-C Pt accepted for transfer to [facility] with Dr. Shelton as accepting. Advised continue CIWA assessments and ativan as needed prior to leaving [place]. - GRIS RossC

## 2021-12-07 NOTE — SBIRT NOTE ADULT - NSSBIRTALCPASSREFTXDET_GEN_A_CORE
Provided SBIRT services: Full screen positive. Referral to Treatment completed. Screening results were reviewed with the patient and patient was provided information about healthy guidelines and potential negative consequences associated with level of risk. Motivation and readiness to reduce or stop use was discussed and goals and activities to make changes were suggested/offered.  Options discussed for further evaluation and treatment- pt requesting rehab placement, however will need detox based on alcohol intake. [facility]s [facility] the following [facility]: [facility], however case review will need to be completed before patient is accepted/denied.

## 2021-12-08 VITALS
OXYGEN SATURATION: 99 % | RESPIRATION RATE: 18 BRPM | SYSTOLIC BLOOD PRESSURE: 150 MMHG | HEART RATE: 92 BPM | DIASTOLIC BLOOD PRESSURE: 100 MMHG | TEMPERATURE: 99 F

## 2021-12-08 RX ADMIN — Medication 1 MILLIGRAM(S): at 03:51

## 2022-02-24 ENCOUNTER — INPATIENT (INPATIENT)
Facility: HOSPITAL | Age: 42
LOS: 14 days | Discharge: PSYCHIATRIC OR SUBSTANCE USE FACILITY | End: 2022-03-11
Attending: PSYCHIATRY & NEUROLOGY | Admitting: PSYCHIATRY & NEUROLOGY
Payer: COMMERCIAL

## 2022-02-24 VITALS — HEIGHT: 68 IN | WEIGHT: 169.98 LBS

## 2022-02-24 DIAGNOSIS — F19.94 OTHER PSYCHOACTIVE SUBSTANCE USE, UNSPECIFIED WITH PSYCHOACTIVE SUBSTANCE-INDUCED MOOD DISORDER: ICD-10-CM

## 2022-02-24 DIAGNOSIS — F10.21 ALCOHOL DEPENDENCE, IN REMISSION: ICD-10-CM

## 2022-02-24 DIAGNOSIS — F10.239 ALCOHOL DEPENDENCE WITH WITHDRAWAL, UNSPECIFIED: ICD-10-CM

## 2022-02-24 LAB
ALBUMIN SERPL ELPH-MCNC: 3.6 G/DL — SIGNIFICANT CHANGE UP (ref 3.3–5)
ALBUMIN SERPL ELPH-MCNC: 3.7 G/DL — SIGNIFICANT CHANGE UP (ref 3.3–5)
ALP SERPL-CCNC: 84 U/L — SIGNIFICANT CHANGE UP (ref 40–120)
ALP SERPL-CCNC: 90 U/L — SIGNIFICANT CHANGE UP (ref 40–120)
ALT FLD-CCNC: 61 U/L — SIGNIFICANT CHANGE UP (ref 12–78)
ALT FLD-CCNC: 63 U/L — SIGNIFICANT CHANGE UP (ref 12–78)
ANION GAP SERPL CALC-SCNC: 5 MMOL/L — SIGNIFICANT CHANGE UP (ref 5–17)
ANION GAP SERPL CALC-SCNC: 7 MMOL/L — SIGNIFICANT CHANGE UP (ref 5–17)
AST SERPL-CCNC: 113 U/L — HIGH (ref 15–37)
AST SERPL-CCNC: 99 U/L — HIGH (ref 15–37)
BASOPHILS # BLD AUTO: 0.03 K/UL — SIGNIFICANT CHANGE UP (ref 0–0.2)
BASOPHILS NFR BLD AUTO: 0.5 % — SIGNIFICANT CHANGE UP (ref 0–2)
BILIRUB DIRECT SERPL-MCNC: 0.6 MG/DL — HIGH (ref 0–0.3)
BILIRUB INDIRECT FLD-MCNC: 0.6 MG/DL — SIGNIFICANT CHANGE UP (ref 0.2–1)
BILIRUB SERPL-MCNC: 1.2 MG/DL — SIGNIFICANT CHANGE UP (ref 0.2–1.2)
BILIRUB SERPL-MCNC: 1.4 MG/DL — HIGH (ref 0.2–1.2)
BUN SERPL-MCNC: 12 MG/DL — SIGNIFICANT CHANGE UP (ref 7–23)
BUN SERPL-MCNC: 13 MG/DL — SIGNIFICANT CHANGE UP (ref 7–23)
CALCIUM SERPL-MCNC: 8.8 MG/DL — SIGNIFICANT CHANGE UP (ref 8.5–10.1)
CALCIUM SERPL-MCNC: 8.9 MG/DL — SIGNIFICANT CHANGE UP (ref 8.5–10.1)
CHLORIDE SERPL-SCNC: 105 MMOL/L — SIGNIFICANT CHANGE UP (ref 96–108)
CHLORIDE SERPL-SCNC: 105 MMOL/L — SIGNIFICANT CHANGE UP (ref 96–108)
CO2 SERPL-SCNC: 25 MMOL/L — SIGNIFICANT CHANGE UP (ref 22–31)
CO2 SERPL-SCNC: 28 MMOL/L — SIGNIFICANT CHANGE UP (ref 22–31)
CREAT SERPL-MCNC: 0.96 MG/DL — SIGNIFICANT CHANGE UP (ref 0.5–1.3)
CREAT SERPL-MCNC: 0.97 MG/DL — SIGNIFICANT CHANGE UP (ref 0.5–1.3)
EOSINOPHIL # BLD AUTO: 0.1 K/UL — SIGNIFICANT CHANGE UP (ref 0–0.5)
EOSINOPHIL NFR BLD AUTO: 1.7 % — SIGNIFICANT CHANGE UP (ref 0–6)
ETHANOL SERPL-MCNC: <10 MG/DL — SIGNIFICANT CHANGE UP (ref 0–10)
GLUCOSE SERPL-MCNC: 134 MG/DL — HIGH (ref 70–99)
GLUCOSE SERPL-MCNC: 149 MG/DL — HIGH (ref 70–99)
HCT VFR BLD CALC: 36.3 % — LOW (ref 39–50)
HGB BLD-MCNC: 11.3 G/DL — LOW (ref 13–17)
IMM GRANULOCYTES NFR BLD AUTO: 0.2 % — SIGNIFICANT CHANGE UP (ref 0–1.5)
LYMPHOCYTES # BLD AUTO: 1.93 K/UL — SIGNIFICANT CHANGE UP (ref 1–3.3)
LYMPHOCYTES # BLD AUTO: 33 % — SIGNIFICANT CHANGE UP (ref 13–44)
MAGNESIUM SERPL-MCNC: 1.9 MG/DL — SIGNIFICANT CHANGE UP (ref 1.6–2.6)
MAGNESIUM SERPL-MCNC: 1.9 MG/DL — SIGNIFICANT CHANGE UP (ref 1.6–2.6)
MCHC RBC-ENTMCNC: 23.9 PG — LOW (ref 27–34)
MCHC RBC-ENTMCNC: 31.1 GM/DL — LOW (ref 32–36)
MCV RBC AUTO: 76.7 FL — LOW (ref 80–100)
MONOCYTES # BLD AUTO: 0.68 K/UL — SIGNIFICANT CHANGE UP (ref 0–0.9)
MONOCYTES NFR BLD AUTO: 11.6 % — SIGNIFICANT CHANGE UP (ref 2–14)
NEUTROPHILS # BLD AUTO: 3.1 K/UL — SIGNIFICANT CHANGE UP (ref 1.8–7.4)
NEUTROPHILS NFR BLD AUTO: 53 % — SIGNIFICANT CHANGE UP (ref 43–77)
PHOSPHATE SERPL-MCNC: 3 MG/DL — SIGNIFICANT CHANGE UP (ref 2.5–4.5)
PHOSPHATE SERPL-MCNC: 3.5 MG/DL — SIGNIFICANT CHANGE UP (ref 2.5–4.5)
PLATELET # BLD AUTO: 96 K/UL — LOW (ref 150–400)
POTASSIUM SERPL-MCNC: 3.5 MMOL/L — SIGNIFICANT CHANGE UP (ref 3.5–5.3)
POTASSIUM SERPL-MCNC: 4 MMOL/L — SIGNIFICANT CHANGE UP (ref 3.5–5.3)
POTASSIUM SERPL-SCNC: 3.5 MMOL/L — SIGNIFICANT CHANGE UP (ref 3.5–5.3)
POTASSIUM SERPL-SCNC: 4 MMOL/L — SIGNIFICANT CHANGE UP (ref 3.5–5.3)
PROT SERPL-MCNC: 8.1 GM/DL — SIGNIFICANT CHANGE UP (ref 6–8.3)
PROT SERPL-MCNC: 8.3 GM/DL — SIGNIFICANT CHANGE UP (ref 6–8.3)
RBC # BLD: 4.73 M/UL — SIGNIFICANT CHANGE UP (ref 4.2–5.8)
RBC # FLD: 19.2 % — HIGH (ref 10.3–14.5)
SARS-COV-2 RNA SPEC QL NAA+PROBE: SIGNIFICANT CHANGE UP
SODIUM SERPL-SCNC: 137 MMOL/L — SIGNIFICANT CHANGE UP (ref 135–145)
SODIUM SERPL-SCNC: 138 MMOL/L — SIGNIFICANT CHANGE UP (ref 135–145)
WBC # BLD: 5.85 K/UL — SIGNIFICANT CHANGE UP (ref 3.8–10.5)
WBC # FLD AUTO: 5.85 K/UL — SIGNIFICANT CHANGE UP (ref 3.8–10.5)

## 2022-02-24 PROCEDURE — 93005 ELECTROCARDIOGRAM TRACING: CPT

## 2022-02-24 PROCEDURE — 99285 EMERGENCY DEPT VISIT HI MDM: CPT

## 2022-02-24 PROCEDURE — 80048 BASIC METABOLIC PNL TOTAL CA: CPT

## 2022-02-24 PROCEDURE — U0005: CPT

## 2022-02-24 PROCEDURE — 84443 ASSAY THYROID STIM HORMONE: CPT

## 2022-02-24 PROCEDURE — U0003: CPT

## 2022-02-24 PROCEDURE — 83036 HEMOGLOBIN GLYCOSYLATED A1C: CPT

## 2022-02-24 PROCEDURE — 83735 ASSAY OF MAGNESIUM: CPT

## 2022-02-24 PROCEDURE — 80076 HEPATIC FUNCTION PANEL: CPT

## 2022-02-24 PROCEDURE — 36415 COLL VENOUS BLD VENIPUNCTURE: CPT

## 2022-02-24 PROCEDURE — 84100 ASSAY OF PHOSPHORUS: CPT

## 2022-02-24 PROCEDURE — 80061 LIPID PANEL: CPT

## 2022-02-24 PROCEDURE — 99232 SBSQ HOSP IP/OBS MODERATE 35: CPT

## 2022-02-24 RX ORDER — ACETAMINOPHEN 500 MG
650 TABLET ORAL EVERY 6 HOURS
Refills: 0 | Status: DISCONTINUED | OUTPATIENT
Start: 2022-02-24 | End: 2022-03-11

## 2022-02-24 RX ORDER — HALOPERIDOL DECANOATE 100 MG/ML
5 INJECTION INTRAMUSCULAR ONCE
Refills: 0 | Status: DISCONTINUED | OUTPATIENT
Start: 2022-02-24 | End: 2022-03-11

## 2022-02-24 RX ORDER — ONDANSETRON 8 MG/1
4 TABLET, FILM COATED ORAL ONCE
Refills: 0 | Status: COMPLETED | OUTPATIENT
Start: 2022-02-24 | End: 2022-02-24

## 2022-02-24 RX ORDER — SODIUM CHLORIDE 9 MG/ML
1000 INJECTION INTRAMUSCULAR; INTRAVENOUS; SUBCUTANEOUS ONCE
Refills: 0 | Status: COMPLETED | OUTPATIENT
Start: 2022-02-24 | End: 2022-02-24

## 2022-02-24 RX ORDER — DIPHENHYDRAMINE HCL 50 MG
50 CAPSULE ORAL ONCE
Refills: 0 | Status: DISCONTINUED | OUTPATIENT
Start: 2022-02-24 | End: 2022-03-11

## 2022-02-24 RX ORDER — THIAMINE MONONITRATE (VIT B1) 100 MG
100 TABLET ORAL ONCE
Refills: 0 | Status: COMPLETED | OUTPATIENT
Start: 2022-02-24 | End: 2022-02-24

## 2022-02-24 RX ORDER — ONDANSETRON 8 MG/1
4 TABLET, FILM COATED ORAL ONCE
Refills: 0 | Status: DISCONTINUED | OUTPATIENT
Start: 2022-02-24 | End: 2022-02-24

## 2022-02-24 RX ORDER — FOLIC ACID 0.8 MG
1 TABLET ORAL ONCE
Refills: 0 | Status: COMPLETED | OUTPATIENT
Start: 2022-02-24 | End: 2022-02-24

## 2022-02-24 RX ADMIN — Medication 650 MILLIGRAM(S): at 23:57

## 2022-02-24 RX ADMIN — Medication 50 MILLIGRAM(S): at 10:38

## 2022-02-24 RX ADMIN — Medication 2 MILLIGRAM(S): at 23:57

## 2022-02-24 RX ADMIN — Medication 1 MILLIGRAM(S): at 10:38

## 2022-02-24 RX ADMIN — SODIUM CHLORIDE 1000 MILLILITER(S): 9 INJECTION INTRAMUSCULAR; INTRAVENOUS; SUBCUTANEOUS at 11:52

## 2022-02-24 RX ADMIN — ONDANSETRON 4 MILLIGRAM(S): 8 TABLET, FILM COATED ORAL at 10:49

## 2022-02-24 RX ADMIN — SODIUM CHLORIDE 1000 MILLILITER(S): 9 INJECTION INTRAMUSCULAR; INTRAVENOUS; SUBCUTANEOUS at 10:49

## 2022-02-24 RX ADMIN — Medication 100 MILLIGRAM(S): at 10:53

## 2022-02-24 NOTE — ED BEHAVIORAL HEALTH ASSESSMENT NOTE - HPI (INCLUDE ILLNESS QUALITY, SEVERITY, DURATION, TIMING, CONTEXT, MODIFYING FACTORS, ASSOCIATED SIGNS AND SYMPTOMS)
42 year-old  male, domiciled with family, unemployed for past several years, from [facility] (English speaking), PMH HTN, dilated nonischemic cardiomyopathy, pacemaker, history of alcohol abuse (recent relapse 1.10.2022 - 2.22.2022), history of cocaine use, history detox a couple of times, history of substance abuse rehabilitation (last 12.2022), no suicidal ideation or suicide attempt, presenting with depression with suicidal ideation.    Patient is alert and oriented to person, time, place and situation. Patient is calm and cooperative, pleasant; linear, organized, with no evidence of thought disorder. Patient complaining of severe depressive symptoms, with persistent sad mood, hopelessness, anhedonia, insomnia, difficulty with concentration, amotivation, anergia. Reports recently starting having suicidal ideation (no plan or intent), exacerbated by grief (last 5 cousins: 3 in [place] and 2 in [facility] secondary to COVID-19). Denies manic / psychotic symptoms. Of note, reports auditory hallucination when intoxicated. No delusions elicited. Reports relapsing on alcohol 1.2022 until this past Tuesday, drinking daily. Denies withdrawals however in last 24 hours. Denies prior seizures. Reports wanting inpatient psychiatric treatment prior to rehabilitation secondary to severe depressive symptoms with hopelessness and suicidal ideation.

## 2022-02-24 NOTE — ED ADULT TRIAGE NOTE - AS HEIGHT TYPE
"Chief Complaint   Patient presents with     Generalized Weakness     fever and feeling weak x yesterday        Initial /66  Pulse 67  Temp 99  F (37.2  C) (Oral)  Wt 211 lb 14.4 oz (96.1 kg)  SpO2 97%  BMI 25.13 kg/m2 Estimated body mass index is 25.13 kg/(m^2) as calculated from the following:    Height as of 9/24/17: 6' 5\" (1.956 m).    Weight as of this encounter: 211 lb 14.4 oz (96.1 kg).  Medication Reconciliation: complete    " stated

## 2022-02-24 NOTE — ED PROVIDER NOTE - PROGRESS NOTE DETAILS
Dc PGY3: SBIRT emailed. Dc PGY3: pt endorses passive SI w/ plan (states several months of intention to shoot himself bc of etoh abuse. States + auditory hallucinations but only when intoxicated, no mental health hx. No guns at home, no prior SI attempt. States hasn't taken meds since 10th of january bc he has been drinking so much. Psych has been consulted. Dc PGY3: pt tba psych for SI.

## 2022-02-24 NOTE — SBIRT NOTE ADULT - NSSBIRTBRIEFINTDET_GEN_A_CORE
Services provided via Telephonic SBIRT line, [phone]. Screening results were reviewed with the patient and patient was provided information about healthy guidelines and potential negative consequences associated with level of risk. Motivation and readiness to reduce or stop use was discussed and goals and activities to make changes were suggested/offered.  [id number] utilized during interaction. Patient expressing interest in detox, followed by rehab and longer term care. HC discussed an admission to [facility] detox with patient, patient agreeable to plan. Patient expressed SI, during call, also mentioned he has been urinating blood and had not taken his medications since December. HC will inform provider of patient's admissions.

## 2022-02-24 NOTE — ED ADULT NURSE NOTE - OBJECTIVE STATEMENT
pt arrives to ED for detoxification. pt states he abused alcohol Pt went to rehab in december and was sober. Pt started drinking again this month. Last drink was Tuesday. Pt complaining of anxiety, shakes, headache. alert and oriented x 4. ambulatory.

## 2022-02-24 NOTE — ED PROVIDER NOTE - OBJECTIVE STATEMENT
42M hx PPM, HTN p/w concerns for etoh withdrawal, detox request. Reports last drink was Tuesday, has been trying to quit. Since last evening has been feeling numb/tingling in extremities, mild abdominal cramping and intermittent sweats. States has been drinking >6 drinks/day. Requesting detox. Denies seizures or LOC. No SI/HI or other substance use.

## 2022-02-24 NOTE — SBIRT NOTE ADULT - NSSBIRTDRGBRIEFINTDET_GEN_A_CORE
Services provided via Telephonic SBIRT line, [phone]. Screening results were reviewed with the patient and patient was provided information about healthy guidelines and potential negative consequences associated with level of risk. Motivation and readiness to reduce or stop use was discussed and goals and activities to make changes were suggested/offered.  [id number] utilized during interaction. Patient expressing interest in detox, followed by rehab and longer term care. HC discussed an admission to [facility] detox with patient, patient agreeable to plan. Patient expressed SI, during call, also mentioned he has been urinating blood and had not taken his medications since December. HC will inform provider of patient's admissions.

## 2022-02-24 NOTE — ED BEHAVIORAL HEALTH ASSESSMENT NOTE - RISK ASSESSMENT
High Acute Suicide Risk HIGH RISK     ACUTE RISK FACTORS: severe depressed mood, hopelessness, anhedonia, insomnia, suicidal ideation, alcohol abuse     CHRONIC RISK FACTORS: polysubstance abuse , medical comorbidities     PROTECTIVE FACTORS: motivation for psychiatric treatment     MITIGATION STRATEGIES: in-patient hospitalization

## 2022-02-24 NOTE — ED ADULT TRIAGE NOTE - CHIEF COMPLAINT QUOTE
Pt presented to the ER with requested detoxification. Pt stated that he was in rehab for ETOH abuse in Dec. and started drinking again last month. Pt stated that his last drink was Tuesday.  Pt is now experiencing shaking, sweating, and hearing things. Pt denies any SI or HI. Pt is requesting help at this time and wants to go back to rehab.

## 2022-02-24 NOTE — ED PROVIDER NOTE - ATTENDING CONTRIBUTION TO CARE
43 yo male presenting with request for detox.  Patient endorses passive SI but does not feel actively suicidal at this time.  Does not have plan.    will checks labs, librium, sbirt and psych consult --> re eval

## 2022-02-24 NOTE — ED BEHAVIORAL HEALTH ASSESSMENT NOTE - NS ED BHA PLAN HANDOFF TO INPATIENT PROVIDER YESNO
Marshal Mendoza & Maria Isabel,  Your glucose levels appear to be elevated - although this is likely due to not fasting.   Your labs note Moderate decrease in kidney function. Please stay well hydrated.   Some of your liver enzymes are mildly elevated.  Your cbc was unremarkable. Normal white count, no anemia.  Your pancreatic enzyme was normal.  We are awaiting the rest of your liver enzymes.  We will await the results of your CT abdomen/pelvis, as well as your liver labs when we receive them.  Please let us know if you have any concerns.  Take care,   Mary    
Yes

## 2022-02-24 NOTE — ED PROVIDER NOTE - CARE PLAN
1 Principal Discharge DX:	Alcohol withdrawal   Principal Discharge DX:	Alcohol withdrawal  Secondary Diagnosis:	Suicidal ideation

## 2022-02-24 NOTE — ED BEHAVIORAL HEALTH ASSESSMENT NOTE - SUMMARY
Detail Level: Detailed Quality 110: Preventive Care And Screening: Influenza Immunization: Influenza Immunization previously received during influenza season 42 year-old  male, domiciled with family, unemployed for past several years, from [facility] (English speaking), PMH HTN, dilated nonischemic cardiomyopathy, pacemaker, history of alcohol abuse (recent relapse 1.10.2022 - 2.22.2022), history of cocaine use, history detox a couple of times, history of substance abuse rehabilitation (last 12.2022), no suicidal ideation or suicide attempt, presenting with depression with suicidal ideation.    Patient presenting with Substance induced Mood Disorder. Patient has Grief reaction. Patient has comorbid Alcohol Use Disorder. Patient has current severe depressed mood, hopelessness, suicidal ideation and not engaged in safety planning, fearing attempting / committing suicide if discharged. Patient seeking voluntary admission for safety and stabilization.

## 2022-02-24 NOTE — ED BEHAVIORAL HEALTH ASSESSMENT NOTE - DETAILS
suicidal ideation with no plan states he had chest pain and palpitations, had cardiac workup done in ER son (11) self-referred As per HPI / Formulation / Summary

## 2022-02-24 NOTE — ED BEHAVIORAL HEALTH ASSESSMENT NOTE - NSSUICPROTFACT_PSY_ALL_CORE
Responsibility to children, family, or others/Identifies reasons for living/Supportive social network of family or friends/Fear of death or the actual act of killing self/Cultural, spiritual and/or moral attitudes against suicide/Spiritism beliefs

## 2022-02-24 NOTE — PHARMACOTHERAPY INTERVENTION NOTE - COMMENTS
Medication reconciliation completed.  Reviewed Medication list and confirmed med allergies with patient; confirmed with Dr. First Medrusty.

## 2022-02-24 NOTE — ED BEHAVIORAL HEALTH ASSESSMENT NOTE - DESCRIPTION
from [facility] HTN, dilated nonischemic cardiomyopathy, pacemaker As per HPI     Vital Signs Last 24 Hrs  T(C): 36.8 (24 Feb 2022 16:40), Max: 36.9 (24 Feb 2022 09:10)  T(F): 98.3 (24 Feb 2022 16:40), Max: 98.4 (24 Feb 2022 09:10)  HR: 86 (24 Feb 2022 16:40) (86 - 93)  BP: 142/100 (24 Feb 2022 16:40) (140/72 - 142/100)  BP(mean): 112 (24 Feb 2022 16:40) (87 - 112)  RR: 18 (24 Feb 2022 16:40) (18 - 18)  SpO2: 99% (24 Feb 2022 16:40) (98% - 99%)    COVID screen:  1. *Have you had a COVID-19 test in the last 90 days? No  3. *Have you tested positive for COVID-19 antibodies? No  5. *Have you received 2 doses of the COVID-19 vaccine? No  7. *In the past 10 days, have you been around anyone with a positive COVID-19 test?* No  13. *Have you been out of [place] within the past 10 days?* No

## 2022-02-24 NOTE — ED PROVIDER NOTE - NS ED ROS FT
CONST: no fevers, no chills, no lightheadedness  HEENT: no vision change, no sore throat  CV: no chest pain, no palpitations  RESP: no cough, no shortness of breath  ABD: no abdominal pain + abd cramps, + nausea no vomiting, no diarrhea  : no dysuria, no hematuria  ENDO: no frequent urination, no unusual thirst  MSK: no musculoskeletal pain  NEURO: no headache, no focal weakness, no loss of sensation + tremors + numb/ting of fingers   SKIN:  no rash

## 2022-02-25 LAB
A1C WITH ESTIMATED AVERAGE GLUCOSE RESULT: 6.5 % — HIGH (ref 4–5.6)
ANION GAP SERPL CALC-SCNC: 7 MMOL/L — SIGNIFICANT CHANGE UP (ref 5–17)
BUN SERPL-MCNC: 13 MG/DL — SIGNIFICANT CHANGE UP (ref 7–23)
CALCIUM SERPL-MCNC: 9.3 MG/DL — SIGNIFICANT CHANGE UP (ref 8.5–10.1)
CHLORIDE SERPL-SCNC: 105 MMOL/L — SIGNIFICANT CHANGE UP (ref 96–108)
CHOLEST SERPL-MCNC: 182 MG/DL — SIGNIFICANT CHANGE UP
CO2 SERPL-SCNC: 26 MMOL/L — SIGNIFICANT CHANGE UP (ref 22–31)
CREAT SERPL-MCNC: 0.94 MG/DL — SIGNIFICANT CHANGE UP (ref 0.5–1.3)
ESTIMATED AVERAGE GLUCOSE: 140 MG/DL — HIGH (ref 68–114)
GLUCOSE SERPL-MCNC: 176 MG/DL — HIGH (ref 70–99)
HDLC SERPL-MCNC: 54 MG/DL — SIGNIFICANT CHANGE UP
LIPID PNL WITH DIRECT LDL SERPL: 110 MG/DL — HIGH
MAGNESIUM SERPL-MCNC: 1.9 MG/DL — SIGNIFICANT CHANGE UP (ref 1.6–2.6)
NON HDL CHOLESTEROL: 127 MG/DL — SIGNIFICANT CHANGE UP
PHOSPHATE SERPL-MCNC: 3.6 MG/DL — SIGNIFICANT CHANGE UP (ref 2.5–4.5)
POTASSIUM SERPL-MCNC: 3.8 MMOL/L — SIGNIFICANT CHANGE UP (ref 3.5–5.3)
POTASSIUM SERPL-SCNC: 3.8 MMOL/L — SIGNIFICANT CHANGE UP (ref 3.5–5.3)
SODIUM SERPL-SCNC: 138 MMOL/L — SIGNIFICANT CHANGE UP (ref 135–145)
TRIGL SERPL-MCNC: 88 MG/DL — SIGNIFICANT CHANGE UP

## 2022-02-25 PROCEDURE — 99223 1ST HOSP IP/OBS HIGH 75: CPT

## 2022-02-25 RX ORDER — SERTRALINE 25 MG/1
25 TABLET, FILM COATED ORAL ONCE
Refills: 0 | Status: COMPLETED | OUTPATIENT
Start: 2022-02-25 | End: 2022-02-25

## 2022-02-25 RX ORDER — THIAMINE MONONITRATE (VIT B1) 100 MG
100 TABLET ORAL DAILY
Refills: 0 | Status: DISCONTINUED | OUTPATIENT
Start: 2022-02-25 | End: 2022-03-11

## 2022-02-25 RX ORDER — ASPIRIN/CALCIUM CARB/MAGNESIUM 324 MG
81 TABLET ORAL DAILY
Refills: 0 | Status: DISCONTINUED | OUTPATIENT
Start: 2022-02-25 | End: 2022-03-11

## 2022-02-25 RX ORDER — SERTRALINE 25 MG/1
50 TABLET, FILM COATED ORAL DAILY
Refills: 0 | Status: DISCONTINUED | OUTPATIENT
Start: 2022-02-26 | End: 2022-03-11

## 2022-02-25 RX ORDER — METOPROLOL TARTRATE 50 MG
50 TABLET ORAL
Refills: 0 | Status: DISCONTINUED | OUTPATIENT
Start: 2022-02-25 | End: 2022-03-11

## 2022-02-25 RX ADMIN — Medication 100 MILLIGRAM(S): at 13:15

## 2022-02-25 RX ADMIN — SERTRALINE 25 MILLIGRAM(S): 25 TABLET, FILM COATED ORAL at 13:15

## 2022-02-25 RX ADMIN — Medication 650 MILLIGRAM(S): at 00:57

## 2022-02-25 RX ADMIN — Medication 650 MILLIGRAM(S): at 13:34

## 2022-02-25 RX ADMIN — Medication 81 MILLIGRAM(S): at 13:15

## 2022-02-25 RX ADMIN — Medication 50 MILLIGRAM(S): at 18:36

## 2022-02-25 RX ADMIN — Medication 650 MILLIGRAM(S): at 12:24

## 2022-02-25 NOTE — PATIENT PROFILE BEHAVIORAL HEALTH - FALL HARM RISK - HARM RISK INTERVENTIONS

## 2022-02-25 NOTE — H&P ADULT - NSHPPHYSICALEXAM_GEN_ALL_CORE
T(C): 36.2 (02-25-22 @ 08:24), Max: 36.9 (02-25-22 @ 06:00)  HR: 91 (02-24-22 @ 22:44) (86 - 91)  BP: 139/96 (02-24-22 @ 22:44) (139/96 - 142/100)  RR: 16 (02-25-22 @ 08:24) (16 - 20)  SpO2: 100% (02-25-22 @ 08:24) (99% - 100%)    CONSTITUTIONAL: Well groomed, no apparent distress    EYES: PERRLA and symmetric, EOMI, No conjunctival or scleral injection, non-icteric    ENMT: Oral mucosa with moist membranes. No external nasal lesions; nasal mucosa not inflamed; normal dentition; no pharyngeal injection or exudates. Otoscopic exam with normal tympanic membranes; no gross hearing impairment noted.  	NECK: Supple, symmetric and without tracheal deviation; thyroid gland not enlarged and without palpable masses    RESPIRATORY: No respiratory distress, no use of accessory muscles; CTA b/l, no wheezes, rales or rhonchi, no dullness or hyperresonance to percussion, no tactile fremitus, no subcutaneous emphysema    CARDIOVASCULAR: RRRR, +S1S2, no murmurs, no rubs, no gallops; no JVD; no peripheral edema  	Vascular: no carotid bruits; no abdominal bruit; carotid pulse palpable, radial pulse palpable, femoral pulse palpable, dorsalis pedis pulse palpable, posterior tibialis pulse palpable    GASTROINTESTINAL: Soft, non tender, non distended, no rebound, no guarding; No palpable masses; no hepatosplenomegaly; no hernia palpated;  	Rectal: normal sphincter tone and no masses palpated; stool negative for blood    GENITOURINARY:  	MALE: Normal appearing external genitalia, no penile lesion; no palpable testicular or scrotal mass; prostate not enlarged and without palpable nodule   	Breasts: Breasts symmetric, no nipple discharge; palpation without masses, lumps, or focal tenderness    	FEMALE: Normal appearing external genitalia, no vaginal discharge or lesion noted; examination of urethra with no abnormalities; bladder without fullness or tenderness on palpation; cervix visualized, without lesion or discharge; palpation of uterus and adnexa without tenderness or mass   	Breasts: Breasts symmetric, no nipple discharge; palpation without masses, lumps, or focal tenderness    LYMPHATIC: No cervical LAD or tenderness; no axillary LAD or tenderness; no inguinal LAD or tenderness    MUSCULOSKELETAL: Normal gait and station; no digital clubbing or cyanosis; examination of the (head/neck, spine/ribs/pelvis, RUE, LUE, RLE, LLE) without misalignment, normal range of motion without pain, no spinal tenderness, normal muscle strength/tone    SKIN: No rashes or ulcers noted; no subcutaneous nodules or induration palpable    NEUROLOGIC: CN II-XII intact; normal reflexes in upper and lower extremities, sensation intact in upper and lower extremities b/l to light touch; Babinski down b/l; no Kernig’s sign, no Brudzinski’s sign    PSYCHIATRIC: Appropriate insight/judgment; A+O x 3, mood and affect appropriate, recent/remote memory intact

## 2022-02-25 NOTE — PROGRESS NOTE BEHAVIORAL HEALTH - NSBHFUPINTERVALHXFT_PSY_A_CORE
· HPI: 42 year-old  male, domiciled with family, unemployed for past several years, from [facility] (English speaking), PMH HTN, dilated nonischemic cardiomyopathy, pacemaker, history of alcohol abuse (recent relapse 1.10.2022 - 2.22.2022), history of cocaine use, history detox a couple of times, history of substance abuse rehabilitation (last 12.2022), no suicidal ideation or suicide attempt, presenting with depression with suicidal ideation.    Patient is alert and oriented to person, time, place and situation. Patient is calm and cooperative, pleasant; linear, organized, with no evidence of thought disorder. Patient complaining of severe depressive symptoms, with persistent sad mood, hopelessness, anhedonia, insomnia, difficulty with concentration, amotivation, anergia. Reports recently starting having suicidal ideation (no plan or intent), exacerbated by grief (last 5 cousins: 3 in [place] and 2 in [facility] secondary to COVID-19). Denies manic / psychotic symptoms. Of note, reports auditory hallucination when intoxicated. No delusions elicited. Reports relapsing on alcohol 1.2022 until this past Tuesday, drinking daily. Denies withdrawals however in last 24 hours. Denies prior seizures. Reports wanting inpatient psychiatric treatment prior to rehabilitation secondary to severe depressive symptoms with hopelessness and suicidal ideation.    Interval Hx: Patient endorses that he is depressed with poor sleep, domiciled with brother/sister who brought hm her to ED at [place]. He never tried to commit suicide in the past, b=ut while drunk he added that he at times hears voices, voices to kill  which he never did and at times talks to self.     Plan: To start Zoloft 25 mg x 1 dose           Increase Zoloft to 50 mg daily and see response          Discharge planning

## 2022-02-25 NOTE — PROGRESS NOTE BEHAVIORAL HEALTH - NSBHADDHXSUBSTFT_PSY_A_CORE
Hx of Alcohol use, relapsed on January' 2022 and has been drinking daily since then with past Detox/Rehab. Hx of Cocaine/Cannabis abuse

## 2022-02-25 NOTE — H&P ADULT - HISTORY OF PRESENT ILLNESS
42 year old male with pmhx of HTN not on any meds who presents from home for feeling depressed.    Patient lives at home with his sister and brother. Patient admits to drinking alot and his last drink was tuesday. When asking him how much he drinks he told me " alot ".     Currently patient has no tremors. he is able to ambulate. He has a flat affect.     Patient takes vitamins for his Blood pressure.     he will be admitted to psych for depression

## 2022-02-25 NOTE — H&P ADULT - ASSESSMENT
42 year old male with depression    1. depression  - as per psych    2. ETOH abuse  - hasnt had a drink in 3 days  - he is currently not withdrawing   - ativan PRN     dvt ppx: ambulation    code: FULL

## 2022-02-25 NOTE — BH SAFETY PLAN - ASK FOR HELP PHONE 1
Attempted to perform internal medicine consultation  However patient has already been discharged at this time 
[phone]

## 2022-02-26 PROCEDURE — 99231 SBSQ HOSP IP/OBS SF/LOW 25: CPT

## 2022-02-26 RX ORDER — FOLIC ACID 0.8 MG
1 TABLET ORAL DAILY
Refills: 0 | Status: DISCONTINUED | OUTPATIENT
Start: 2022-02-26 | End: 2022-03-11

## 2022-02-26 RX ADMIN — Medication 50 MILLIGRAM(S): at 21:07

## 2022-02-26 RX ADMIN — Medication 1 TABLET(S): at 10:07

## 2022-02-26 RX ADMIN — Medication 100 MILLIGRAM(S): at 10:08

## 2022-02-26 RX ADMIN — Medication 1 MILLIGRAM(S): at 10:08

## 2022-02-26 RX ADMIN — SERTRALINE 50 MILLIGRAM(S): 25 TABLET, FILM COATED ORAL at 10:08

## 2022-02-26 RX ADMIN — Medication 50 MILLIGRAM(S): at 10:07

## 2022-02-26 RX ADMIN — Medication 81 MILLIGRAM(S): at 10:07

## 2022-02-26 NOTE — PROGRESS NOTE BEHAVIORAL HEALTH - OTHER
pt appears dysphoric and apprehensive despite false bravado becoming more spontaneous " I am feeling alright."

## 2022-02-26 NOTE — PROGRESS NOTE BEHAVIORAL HEALTH - NSBHFUPINTERVALHXFT_PSY_A_CORE
Covering MD Note( 2/26/2022)    Pt seen in the day room. Neatly attired and with good eye contact. Pt more visible on the unit and interacting with bilingual peers and staff.  The pt reported poor sleep in part due to other unit disruptions. The pt remains depressed appearing with constricted affect and with soft coherent speech . The pt spoke of his family in [facility] and of his strong support system here in [place]. The pt acknowledged his longstanding h/o ETOH  use d/o along with comorbid cocaine  and THC use  and he has been told by family and by hospital staff of the synergistic CNS depressant effects of ETOH along with worsening depression in the context of cocaine abrupt discontinuation. The pt has been talking with his family who remain very supportive  of the pt. The pt reporting eating alright and he is tolerating the newly begun low dose SSRI Zoloft at 25 mg now increased to 50 mg po q am in order to alleviate the pt's dual diagnosis mood and substance use disorders.     The pt has been encouraged to attend therapy groups especially those with a focus on substance use d/o treatment. The pt denies current SI /HI and he has no h/o psychosis. CIWA as above with most recent score =0. The pt reports motivation to remain clean and sober after discharge with substance use d/o treatment planning ongoing.

## 2022-02-27 PROCEDURE — 99231 SBSQ HOSP IP/OBS SF/LOW 25: CPT

## 2022-02-27 RX ORDER — HYDROCORTISONE 1 %
1 OINTMENT (GRAM) TOPICAL
Refills: 0 | Status: DISCONTINUED | OUTPATIENT
Start: 2022-02-27 | End: 2022-03-11

## 2022-02-27 RX ADMIN — Medication 50 MILLIGRAM(S): at 21:44

## 2022-02-27 RX ADMIN — Medication 1 MILLIGRAM(S): at 09:51

## 2022-02-27 RX ADMIN — Medication 100 MILLIGRAM(S): at 09:51

## 2022-02-27 RX ADMIN — Medication 1 APPLICATION(S): at 21:44

## 2022-02-27 RX ADMIN — SERTRALINE 50 MILLIGRAM(S): 25 TABLET, FILM COATED ORAL at 09:51

## 2022-02-27 RX ADMIN — Medication 50 MILLIGRAM(S): at 09:52

## 2022-02-27 RX ADMIN — Medication 1 TABLET(S): at 09:51

## 2022-02-27 RX ADMIN — Medication 81 MILLIGRAM(S): at 09:51

## 2022-02-27 NOTE — PROGRESS NOTE BEHAVIORAL HEALTH - NSBHFUPINTERVALHXFT_PSY_A_CORE
Covering MD Note( 2/27/2022)    Pt seen in the day room. Neatly attired and with good eye contact.  Pt wearing a face mask as per CDC guidelines . Pt more visible on the unit and interacting with a few bilingual peers and staff.  The pt reported ongoing poor sleep in part due to other unit disruptions. The pt  appears less overtly depressed but more anxious appearing with constricted affect and with soft coherent speech . The pt spoke of his family in [facility] and of his strong support system here in [place]. The pt acknowledged his longstanding h/o ETOH  use d/o along with comorbid cocaine  and THC use  and he has been told by family and by hospital staff of the synergistic CNS depressant effects of ETOH along with worsening depression in the context of cocaine abrupt discontinuation. The pt has been talking with his family who remain very supportive  of the pt. The pt reporting eating alright and he is tolerating the newly begun low dose SSRI Zoloft at 25 mg now increased to 50 mg po q am in order to alleviate the pt's dual diagnosis mood and substance use disorders.     The pt has been encouraged to attend therapy groups especially those with a focus on substance use d/o treatment. The pt denies current SI /HI and he has no h/o psychosis. CIWA as above with most recent score =0. The pt reports motivation to remain clean and sober after discharge with substance use d/o treatment planning ongoing. Writer reviewed with the pt the pt's elevated HgA1C = 6.5 and plan to have hospitalist reconsult with plan for Insulin sliding scale and further follow up outpatient.    Pt amenable to plan

## 2022-02-27 NOTE — PROGRESS NOTE BEHAVIORAL HEALTH - OTHER
becoming more spontaneous " I am feeling alright." pt appears somewhat less dysphoric but more apprehensive

## 2022-02-28 PROCEDURE — 99232 SBSQ HOSP IP/OBS MODERATE 35: CPT

## 2022-02-28 RX ORDER — BENZOCAINE AND MENTHOL 5; 1 G/100ML; G/100ML
1 LIQUID ORAL
Refills: 0 | Status: DISCONTINUED | OUTPATIENT
Start: 2022-02-28 | End: 2022-03-11

## 2022-02-28 RX ADMIN — Medication 1 APPLICATION(S): at 09:39

## 2022-02-28 RX ADMIN — Medication 1 APPLICATION(S): at 21:58

## 2022-02-28 RX ADMIN — Medication 650 MILLIGRAM(S): at 21:59

## 2022-02-28 RX ADMIN — Medication 1 TABLET(S): at 09:38

## 2022-02-28 RX ADMIN — Medication 650 MILLIGRAM(S): at 12:15

## 2022-02-28 RX ADMIN — SERTRALINE 50 MILLIGRAM(S): 25 TABLET, FILM COATED ORAL at 09:38

## 2022-02-28 RX ADMIN — Medication 50 MILLIGRAM(S): at 21:58

## 2022-02-28 RX ADMIN — BENZOCAINE AND MENTHOL 1 LOZENGE: 5; 1 LIQUID ORAL at 16:40

## 2022-02-28 RX ADMIN — Medication 650 MILLIGRAM(S): at 23:00

## 2022-02-28 RX ADMIN — Medication 50 MILLIGRAM(S): at 09:39

## 2022-02-28 RX ADMIN — Medication 650 MILLIGRAM(S): at 11:10

## 2022-02-28 RX ADMIN — Medication 1 MILLIGRAM(S): at 09:38

## 2022-02-28 RX ADMIN — Medication 100 MILLIGRAM(S): at 09:38

## 2022-02-28 RX ADMIN — Medication 81 MILLIGRAM(S): at 09:38

## 2022-02-28 NOTE — PROGRESS NOTE BEHAVIORAL HEALTH - OTHER
pt appears somewhat less dysphoric but more apprehensive " I am feeling alright." becoming more spontaneous

## 2022-02-28 NOTE — PROGRESS NOTE BEHAVIORAL HEALTH - NSBHFUPINTERVALHXFT_PSY_A_CORE
Covering MD Note( 2/27/2022)    Pt seen in the day room. Neatly attired and with good eye contact.  Pt wearing a face mask as per CDC guidelines . Pt more visible on the unit and interacting with a few bilingual peers and staff.  The pt reported ongoing poor sleep in part due to other unit disruptions. The pt  appears less overtly depressed but more anxious appearing with constricted affect and with soft coherent speech . The pt spoke of his family in [facility] and of his strong support system here in [place]. The pt acknowledged his longstanding h/o ETOH  use d/o along with comorbid cocaine  and THC use  and he has been told by family and by hospital staff of the synergistic CNS depressant effects of ETOH along with worsening depression in the context of cocaine abrupt discontinuation. The pt has been talking with his family who remain very supportive  of the pt. The pt reporting eating alright and he is tolerating the newly begun low dose SSRI Zoloft at 25 mg now increased to 50 mg po q am in order to alleviate the pt's dual diagnosis mood and substance use disorders.     The pt has been encouraged to attend therapy groups especially those with a focus on substance use d/o treatment. The pt denies current SI /HI and he has no h/o psychosis. CIWA as above with most recent score =0. The pt reports motivation to remain clean and sober after discharge with substance use d/o treatment planning ongoing. Writer reviewed with the pt the pt's elevated HgA1C = 6.5 and plan to have hospitalist reconsult with plan for Insulin sliding scale and further follow up outpatient.    Pt amenable to plan    02/28/2022: Patient was seen today AM, chart reviewed and discussed in team. He is meds compliant and seems to be coasting well with the meds given. Overall OK and with limited participation in unit activities, no PRN needed, good BP control with Metoprolol. Tolerating Zoloft well.

## 2022-03-01 PROCEDURE — 99232 SBSQ HOSP IP/OBS MODERATE 35: CPT

## 2022-03-01 RX ADMIN — Medication 1 APPLICATION(S): at 19:55

## 2022-03-01 RX ADMIN — Medication 81 MILLIGRAM(S): at 09:29

## 2022-03-01 RX ADMIN — Medication 50 MILLIGRAM(S): at 19:56

## 2022-03-01 RX ADMIN — Medication 1 TABLET(S): at 09:29

## 2022-03-01 RX ADMIN — Medication 1 MILLIGRAM(S): at 09:29

## 2022-03-01 RX ADMIN — Medication 50 MILLIGRAM(S): at 09:29

## 2022-03-01 RX ADMIN — Medication 100 MILLIGRAM(S): at 09:29

## 2022-03-01 RX ADMIN — SERTRALINE 50 MILLIGRAM(S): 25 TABLET, FILM COATED ORAL at 09:29

## 2022-03-01 NOTE — PROGRESS NOTE BEHAVIORAL HEALTH - NSBHFUPINTERVALHXFT_PSY_A_CORE
Covering MD Note( 2/27/2022)    Pt seen in the day room. Neatly attired and with good eye contact.  Pt wearing a face mask as per CDC guidelines . Pt more visible on the unit and interacting with a few bilingual peers and staff.  The pt reported ongoing poor sleep in part due to other unit disruptions. The pt  appears less overtly depressed but more anxious appearing with constricted affect and with soft coherent speech . The pt spoke of his family in [facility] and of his strong support system here in [place]. The pt acknowledged his longstanding h/o ETOH  use d/o along with comorbid cocaine  and THC use  and he has been told by family and by hospital staff of the synergistic CNS depressant effects of ETOH along with worsening depression in the context of cocaine abrupt discontinuation. The pt has been talking with his family who remain very supportive  of the pt. The pt reporting eating alright and he is tolerating the newly begun low dose SSRI Zoloft at 25 mg now increased to 50 mg po q am in order to alleviate the pt's dual diagnosis mood and substance use disorders.     The pt has been encouraged to attend therapy groups especially those with a focus on substance use d/o treatment. The pt denies current SI /HI and he has no h/o psychosis. CIWA as above with most recent score =0. The pt reports motivation to remain clean and sober after discharge with substance use d/o treatment planning ongoing. Writer reviewed with the pt the pt's elevated HgA1C = 6.5 and plan to have hospitalist reconsult with plan for Insulin sliding scale and further follow up outpatient.    Pt amenable to plan    02/28/2022: Patient was seen today AM, chart reviewed and discussed in team. He is meds compliant and seems to be coasting well with the meds given. Overall OK and with limited participation in unit activities, no PRN needed, good BP control with Metoprolol. Tolerating Zoloft well.    03/01/2022: Patient was seen today AM, no acute issues, endorses that he would like to stay for a long period if needed for adequate stability. Tolerating  meds well with fair to good sleep/appetite. He has a Cardiac Pacemaker and is due for checkup tomorrow. Cardiology Consult for EP done , patient to go down tomorrow to the 4th floor here at  for pacemaker adjustment.

## 2022-03-01 NOTE — PROGRESS NOTE BEHAVIORAL HEALTH - OTHER
" I am feeling alright." pt appears somewhat less dysphoric but more apprehensive becoming more spontaneous

## 2022-03-01 NOTE — ED BEHAVIORAL HEALTH ASSESSMENT NOTE - KNOWN PSYCHIATRIC ADMISSION WITHIN THE PAST 30 DAYS
Quality 110: Preventive Care And Screening: Influenza Immunization: Influenza immunization was not ordered or administered, reason not given Detail Level: Generalized No

## 2022-03-02 PROCEDURE — 93284 PRGRMG EVAL IMPLANTABLE DFB: CPT | Mod: 26

## 2022-03-02 PROCEDURE — 99231 SBSQ HOSP IP/OBS SF/LOW 25: CPT

## 2022-03-02 RX ADMIN — Medication 100 MILLIGRAM(S): at 09:27

## 2022-03-02 RX ADMIN — Medication 1 MILLIGRAM(S): at 09:26

## 2022-03-02 RX ADMIN — Medication 1 TABLET(S): at 09:26

## 2022-03-02 RX ADMIN — Medication 81 MILLIGRAM(S): at 09:25

## 2022-03-02 RX ADMIN — SERTRALINE 50 MILLIGRAM(S): 25 TABLET, FILM COATED ORAL at 09:25

## 2022-03-02 RX ADMIN — Medication 1 APPLICATION(S): at 09:28

## 2022-03-02 RX ADMIN — Medication 50 MILLIGRAM(S): at 09:30

## 2022-03-02 RX ADMIN — Medication 50 MILLIGRAM(S): at 21:24

## 2022-03-02 RX ADMIN — BENZOCAINE AND MENTHOL 1 LOZENGE: 5; 1 LIQUID ORAL at 09:28

## 2022-03-02 RX ADMIN — Medication 1 APPLICATION(S): at 20:28

## 2022-03-02 NOTE — PROCEDURE NOTE - ADDITIONAL PROCEDURE DETAILS
CRT-D with normal function, adequate sensing and pacing thresholds.  Stored data reveals multiple SVT events for review c/w previous interrogations.  Patient reports some missed meds recently.  Reinforced importance of medication adherence.  Recommend routine device follow up in three months.

## 2022-03-02 NOTE — PROGRESS NOTE BEHAVIORAL HEALTH - NSBHFUPINTERVALHXFT_PSY_A_CORE
Covering MD Note( 2/27/2022)    Pt seen in the day room. Neatly attired and with good eye contact.  Pt wearing a face mask as per CDC guidelines . Pt more visible on the unit and interacting with a few bilingual peers and staff.  The pt reported ongoing poor sleep in part due to other unit disruptions. The pt  appears less overtly depressed but more anxious appearing with constricted affect and with soft coherent speech . The pt spoke of his family in [facility] and of his strong support system here in [place]. The pt acknowledged his longstanding h/o ETOH  use d/o along with comorbid cocaine  and THC use  and he has been told by family and by hospital staff of the synergistic CNS depressant effects of ETOH along with worsening depression in the context of cocaine abrupt discontinuation. The pt has been talking with his family who remain very supportive  of the pt. The pt reporting eating alright and he is tolerating the newly begun low dose SSRI Zoloft at 25 mg now increased to 50 mg po q am in order to alleviate the pt's dual diagnosis mood and substance use disorders.     The pt has been encouraged to attend therapy groups especially those with a focus on substance use d/o treatment. The pt denies current SI /HI and he has no h/o psychosis. CIWA as above with most recent score =0. The pt reports motivation to remain clean and sober after discharge with substance use d/o treatment planning ongoing. Writer reviewed with the pt the pt's elevated HgA1C = 6.5 and plan to have hospitalist reconsult with plan for Insulin sliding scale and further follow up outpatient.    Pt amenable to plan    02/28/2022: Patient was seen today AM, chart reviewed and discussed in team. He is meds compliant and seems to be coasting well with the meds given. Overall OK and with limited participation in unit activities, no PRN needed, good BP control with Metoprolol. Tolerating Zoloft well.    03/01/2022: Patient was seen today AM, no acute issues, endorses that he would like to stay for a long period if needed for adequate stability. Tolerating  meds well with fair to good sleep/appetite. He has a Cardiac Pacemaker and is due for checkup tomorrow. Cardiology Consult for EP done , patient to go down tomorrow to the 4th floor here at  for pacemaker adjustment.    03/02/2022: Patient was seen today AM, chart reviewed and discussed in team. Mood seems OK, no acute issues need to go to 4 th floor for Pacemaker function. will do tomorrow, no other acute issues prefers to got rehab, SW are.

## 2022-03-03 DIAGNOSIS — I10 ESSENTIAL (PRIMARY) HYPERTENSION: ICD-10-CM

## 2022-03-03 DIAGNOSIS — Z86.79 PERSONAL HISTORY OF OTHER DISEASES OF THE CIRCULATORY SYSTEM: ICD-10-CM

## 2022-03-03 DIAGNOSIS — Z95.810 PRESENCE OF AUTOMATIC (IMPLANTABLE) CARDIAC DEFIBRILLATOR: ICD-10-CM

## 2022-03-03 LAB — SARS-COV-2 RNA SPEC QL NAA+PROBE: SIGNIFICANT CHANGE UP

## 2022-03-03 PROCEDURE — 99232 SBSQ HOSP IP/OBS MODERATE 35: CPT

## 2022-03-03 PROCEDURE — 99222 1ST HOSP IP/OBS MODERATE 55: CPT

## 2022-03-03 RX ADMIN — Medication 1 MILLIGRAM(S): at 09:18

## 2022-03-03 RX ADMIN — Medication 50 MILLIGRAM(S): at 20:44

## 2022-03-03 RX ADMIN — Medication 50 MILLIGRAM(S): at 09:18

## 2022-03-03 RX ADMIN — Medication 100 MILLIGRAM(S): at 09:18

## 2022-03-03 RX ADMIN — Medication 81 MILLIGRAM(S): at 09:18

## 2022-03-03 RX ADMIN — SERTRALINE 50 MILLIGRAM(S): 25 TABLET, FILM COATED ORAL at 09:18

## 2022-03-03 RX ADMIN — Medication 1 TABLET(S): at 09:18

## 2022-03-03 NOTE — CONSULT NOTE ADULT - PROBLEM SELECTOR RECOMMENDATION 2
Past cardiomyopathy associated with severe LV dysfunction -- LV function has improved and was normal on TTE in 11/2021.  Alcohol cessation.

## 2022-03-03 NOTE — PROGRESS NOTE BEHAVIORAL HEALTH - NSBHFUPINTERVALHXFT_PSY_A_CORE
Covering MD Note( 2/27/2022)    Pt seen in the day room. Neatly attired and with good eye contact.  Pt wearing a face mask as per CDC guidelines . Pt more visible on the unit and interacting with a few bilingual peers and staff.  The pt reported ongoing poor sleep in part due to other unit disruptions. The pt  appears less overtly depressed but more anxious appearing with constricted affect and with soft coherent speech . The pt spoke of his family in [facility] and of his strong support system here in [place]. The pt acknowledged his longstanding h/o ETOH  use d/o along with comorbid cocaine  and THC use  and he has been told by family and by hospital staff of the synergistic CNS depressant effects of ETOH along with worsening depression in the context of cocaine abrupt discontinuation. The pt has been talking with his family who remain very supportive  of the pt. The pt reporting eating alright and he is tolerating the newly begun low dose SSRI Zoloft at 25 mg now increased to 50 mg po q am in order to alleviate the pt's dual diagnosis mood and substance use disorders.     The pt has been encouraged to attend therapy groups especially those with a focus on substance use d/o treatment. The pt denies current SI /HI and he has no h/o psychosis. CIWA as above with most recent score =0. The pt reports motivation to remain clean and sober after discharge with substance use d/o treatment planning ongoing. Writer reviewed with the pt the pt's elevated HgA1C = 6.5 and plan to have hospitalist reconsult with plan for Insulin sliding scale and further follow up outpatient.    Pt amenable to plan    02/28/2022: Patient was seen today AM, chart reviewed and discussed in team. He is meds compliant and seems to be coasting well with the meds given. Overall OK and with limited participation in unit activities, no PRN needed, good BP control with Metoprolol. Tolerating Zoloft well.    03/01/2022: Patient was seen today AM, no acute issues, endorses that he would like to stay for a long period if needed for adequate stability. Tolerating  meds well with fair to good sleep/appetite. He has a Cardiac Pacemaker and is due for checkup tomorrow. Cardiology Consult for EP done , patient to go down tomorrow to the 4th floor here at  for pacemaker adjustment.    03/02/2022: Patient was seen today AM, chart reviewed and discussed in team. Mood seems OK, no acute issues need to go to 4 th floor for Pacemaker function. will do tomorrow, no other acute issues prefers to got rehab, SW are.    03/03/2022: Patient was seen today AM, chart reviewed and discussed in team. He is meds compliant and seems to be doing very well on meds prescribed. Seen by Cardiology and has no active recommendation. HTN in control with Metoprolol, TTE- Advise to stop drinking , Pacemaker in function. [facility], prefers, SW to start rehab processing.

## 2022-03-03 NOTE — CONSULT NOTE ADULT - SUBJECTIVE AND OBJECTIVE BOX
CHIEF COMPLAINT: No cardiac complaints; admitted to  for depression    HPI:  42 year old man with a history of alcohol abuse, cardiomyopathy, ICD, and HTN who presented to the ED on 2/24/22 requesting etOH detoxification.  He was not in alcohol withdrawal and was admitted to  for depression.  He saw my associate, Dr Dill in 3/2021; tells me that he sees Dr Mcguire (EP) in the outpatient setting.    He has no cardiac complaints -- specifically denies: angina, dyspnea, palpitations edema.  e with pmhx of HTN not on any meds who presents from home for feeling depressed.      PAST MEDICAL & SURGICAL HISTORY:  Pacemaker  CHF  HTN (hypertension)    SOCIAL HISTORY:   Alcohol: Yes    FAMILY HISTORY:  HTN (hypertension)    MEDICATIONS  (STANDING):  aspirin enteric coated 81 milliGRAM(s) Oral daily  folic acid 1 milliGRAM(s) Oral daily  hydrocortisone 1% Cream 1 Application(s) Topical two times a day  metoprolol tartrate 50 milliGRAM(s) Oral two times a day  multivitamin 1 Tablet(s) Oral daily  sertraline 50 milliGRAM(s) Oral daily  thiamine 100 milliGRAM(s) Oral daily    MEDICATIONS  (PRN):  acetaminophen     Tablet .. 650 milliGRAM(s) Oral every 6 hours PRN Temp greater or equal to 38C (100.4F), Mild Pain (1 - 3)  benzocaine 15 mG/menthol 3.6 mG Lozenge 1 Lozenge Oral every 2 hours PRN sore throat  diphenhydrAMINE Injectable 50 milliGRAM(s) IntraMuscular once PRN Extrapyramidal prophylaxis  haloperidol    Injectable 5 milliGRAM(s) IntraMuscular once PRN Psychotic Agitation SEVERE  LORazepam     Tablet 2 milliGRAM(s) Oral every 2 hours PRN CIWA-Ar score 8 or greater  LORazepam   Injectable 2 milliGRAM(s) IntraMuscular once PRN Anxiety SEVERE    Allergies:  No Known Allergies    REVIEW OF SYSTEMS:  CONSTITUTIONAL: No weakness, fevers or chills  Eyes: No visual changes  NECK: No pain or stiffness  RESPIRATORY: No cough, wheezing, hemoptysis; No shortness of breath  CARDIOVASCULAR: No chest pain or palpitations  GASTROINTESTINAL: No abdominal pain. No nausea, vomiting, or hematemesis; No diarrhea or constipation. No melena or hematochezia.  GENITOURINARY: No dysuria, frequency or hematuria  NEUROLOGICAL: No numbness.  SKIN: No itching or rash  PSYCH: Depressed mood  All other review of systems is negative unless indicated above    VITAL SIGNS:   Vital Signs Last 24 Hrs  T(C): 36.9 (03 Mar 2022 07:46), Max: 36.9 (03 Mar 2022 07:46)  T(F): 98.4 (03 Mar 2022 07:46), Max: 98.4 (03 Mar 2022 07:46)  HR: 74 (02 Mar 2022 19:57) (74 - 74)  BP: 133/86 (02 Mar 2022 19:57) (133/86 - 133/86)  RR: 16 (03 Mar 2022 07:46) (16 - 16)  SpO2: 100% (03 Mar 2022 07:46) (100% - 100%)    PHYSICAL EXAM:  Constitutional: NAD, awake and alert  HEENT:  EOMI,  Pupils round, No oral cyanosis.  Pulmonary: Non-labored, breath sounds are clear bilaterally, No wheezing, rales or rhonchi  Cardiovascular: S1 and S2, regular rate and rhythm, no Murmurs, gallops or rubs  Gastrointestinal: Bowel Sounds present, soft, nontender.   Lymph: No peripheral edema. No cervical lymphadenopathy.  Neurological: Alert, no focal deficits  Skin: No rashes.  Psych:  Mood & affect appropriate    TTE Echo Complete w/o Contrast w/ Doppler (11.16.21 @ 19:58) >  The mitral valve leaflets appear normal; there is no evidence of stenosis, fluttering or prolapse.   Mild (1+) mitral regurgitation is present.   Normal aortic valve structure and function.   The tricuspid valve leaflets are thin and pliable; valve motion is normal. Mild (1+) tricuspid valve regurgitation is present.   Normal appearing pulmonic valve structure and function.   The left atrium is mildly dilated.   Estimated left ventricular ejection fraction is 55 %.   Mild concentric left ventricular hypertrophy is present.   Left ventricular wall motion is normal.   The right ventricle is normal in size.   A device wire is seen in the RV and RA.   Pleural effusion cannot be ruled out.

## 2022-03-03 NOTE — PROGRESS NOTE BEHAVIORAL HEALTH - OTHER
becoming more spontaneous pt appears somewhat less dysphoric but more apprehensive " I am feeling alright."

## 2022-03-04 PROCEDURE — 99231 SBSQ HOSP IP/OBS SF/LOW 25: CPT

## 2022-03-04 RX ADMIN — Medication 1 APPLICATION(S): at 20:06

## 2022-03-04 RX ADMIN — Medication 1 TABLET(S): at 09:41

## 2022-03-04 RX ADMIN — Medication 50 MILLIGRAM(S): at 09:41

## 2022-03-04 RX ADMIN — Medication 1 MILLIGRAM(S): at 09:41

## 2022-03-04 RX ADMIN — Medication 50 MILLIGRAM(S): at 20:06

## 2022-03-04 RX ADMIN — Medication 100 MILLIGRAM(S): at 09:41

## 2022-03-04 RX ADMIN — SERTRALINE 50 MILLIGRAM(S): 25 TABLET, FILM COATED ORAL at 09:41

## 2022-03-04 RX ADMIN — Medication 81 MILLIGRAM(S): at 09:41

## 2022-03-04 NOTE — PROGRESS NOTE BEHAVIORAL HEALTH - NSBHFUPINTERVALHXFT_PSY_A_CORE
Covering MD Note( 2/27/2022)    Pt seen in the day room. Neatly attired and with good eye contact.  Pt wearing a face mask as per CDC guidelines . Pt more visible on the unit and interacting with a few bilingual peers and staff.  The pt reported ongoing poor sleep in part due to other unit disruptions. The pt  appears less overtly depressed but more anxious appearing with constricted affect and with soft coherent speech . The pt spoke of his family in [facility] and of his strong support system here in [place]. The pt acknowledged his longstanding h/o ETOH  use d/o along with comorbid cocaine  and THC use  and he has been told by family and by hospital staff of the synergistic CNS depressant effects of ETOH along with worsening depression in the context of cocaine abrupt discontinuation. The pt has been talking with his family who remain very supportive  of the pt. The pt reporting eating alright and he is tolerating the newly begun low dose SSRI Zoloft at 25 mg now increased to 50 mg po q am in order to alleviate the pt's dual diagnosis mood and substance use disorders.     The pt has been encouraged to attend therapy groups especially those with a focus on substance use d/o treatment. The pt denies current SI /HI and he has no h/o psychosis. CIWA as above with most recent score =0. The pt reports motivation to remain clean and sober after discharge with substance use d/o treatment planning ongoing. Writer reviewed with the pt the pt's elevated HgA1C = 6.5 and plan to have hospitalist reconsult with plan for Insulin sliding scale and further follow up outpatient.    Pt amenable to plan    02/28/2022: Patient was seen today AM, chart reviewed and discussed in team. He is meds compliant and seems to be coasting well with the meds given. Overall OK and with limited participation in unit activities, no PRN needed, good BP control with Metoprolol. Tolerating Zoloft well.    03/01/2022: Patient was seen today AM, no acute issues, endorses that he would like to stay for a long period if needed for adequate stability. Tolerating  meds well with fair to good sleep/appetite. He has a Cardiac Pacemaker and is due for checkup tomorrow. Cardiology Consult for EP done , patient to go down tomorrow to the 4th floor here at  for pacemaker adjustment.    03/02/2022: Patient was seen today AM, chart reviewed and discussed in team. Mood seems OK, no acute issues need to go to 4 th floor for Pacemaker function. will do tomorrow, no other acute issues prefers to got rehab, SW are.    03/03/2022: Patient was seen today AM, chart reviewed and discussed in team. He is meds compliant and seems to be doing very well on meds prescribed. Seen by Cardiology and has no active recommendation. HTN in control with Metoprolol, TTE- Advise to stop drinking , Pacemaker in function. To apply for rehab as pt, prefers, SW to start rehab processing.    03/04/2022: Patient was seen today AM, chart reviewed and discussed in team. He was in bed and woke up on mentioning his name. He endorses that he feels better and has been meds compliant all along and also prefers to have rehab rather in-patient as he fells that he needs it. He was also suggested by Cardiology to avoid ETOH, so going for rehab would be the best choice. No meds changes for now, cleared from Cardiology and mentally feels OK, to go to rehab.

## 2022-03-04 NOTE — PROGRESS NOTE BEHAVIORAL HEALTH - OTHER
pt appears somewhat less dysphoric but more apprehensive becoming more spontaneous " I am feeling alright."

## 2022-03-05 RX ADMIN — Medication 1 TABLET(S): at 09:11

## 2022-03-05 RX ADMIN — Medication 50 MILLIGRAM(S): at 09:10

## 2022-03-05 RX ADMIN — Medication 81 MILLIGRAM(S): at 09:10

## 2022-03-05 RX ADMIN — Medication 100 MILLIGRAM(S): at 09:11

## 2022-03-05 RX ADMIN — Medication 650 MILLIGRAM(S): at 12:28

## 2022-03-05 RX ADMIN — Medication 50 MILLIGRAM(S): at 21:33

## 2022-03-05 RX ADMIN — Medication 650 MILLIGRAM(S): at 13:28

## 2022-03-05 RX ADMIN — SERTRALINE 50 MILLIGRAM(S): 25 TABLET, FILM COATED ORAL at 09:12

## 2022-03-05 RX ADMIN — Medication 1 MILLIGRAM(S): at 09:10

## 2022-03-05 RX ADMIN — Medication 1 APPLICATION(S): at 09:12

## 2022-03-05 RX ADMIN — Medication 1 APPLICATION(S): at 21:33

## 2022-03-06 RX ADMIN — Medication 1 APPLICATION(S): at 21:36

## 2022-03-06 RX ADMIN — Medication 100 MILLIGRAM(S): at 09:17

## 2022-03-06 RX ADMIN — SERTRALINE 50 MILLIGRAM(S): 25 TABLET, FILM COATED ORAL at 09:17

## 2022-03-06 RX ADMIN — Medication 50 MILLIGRAM(S): at 09:17

## 2022-03-06 RX ADMIN — Medication 1 TABLET(S): at 09:17

## 2022-03-06 RX ADMIN — Medication 1 MILLIGRAM(S): at 09:17

## 2022-03-06 RX ADMIN — Medication 81 MILLIGRAM(S): at 09:17

## 2022-03-06 RX ADMIN — Medication 50 MILLIGRAM(S): at 21:36

## 2022-03-07 LAB — SARS-COV-2 RNA SPEC QL NAA+PROBE: SIGNIFICANT CHANGE UP

## 2022-03-07 PROCEDURE — 99231 SBSQ HOSP IP/OBS SF/LOW 25: CPT

## 2022-03-07 RX ADMIN — Medication 50 MILLIGRAM(S): at 21:13

## 2022-03-07 RX ADMIN — Medication 81 MILLIGRAM(S): at 09:31

## 2022-03-07 RX ADMIN — Medication 650 MILLIGRAM(S): at 22:18

## 2022-03-07 RX ADMIN — Medication 1 APPLICATION(S): at 09:31

## 2022-03-07 RX ADMIN — Medication 650 MILLIGRAM(S): at 21:13

## 2022-03-07 RX ADMIN — Medication 1 TABLET(S): at 09:31

## 2022-03-07 RX ADMIN — SERTRALINE 50 MILLIGRAM(S): 25 TABLET, FILM COATED ORAL at 09:31

## 2022-03-07 RX ADMIN — Medication 1 APPLICATION(S): at 21:14

## 2022-03-07 RX ADMIN — Medication 100 MILLIGRAM(S): at 09:30

## 2022-03-07 RX ADMIN — Medication 1 MILLIGRAM(S): at 09:31

## 2022-03-07 RX ADMIN — Medication 50 MILLIGRAM(S): at 09:31

## 2022-03-07 NOTE — PROGRESS NOTE BEHAVIORAL HEALTH - NSBHFUPINTERVALHXFT_PSY_A_CORE
Covering MD Note( 2/27/2022)    Pt seen in the day room. Neatly attired and with good eye contact.  Pt wearing a face mask as per CDC guidelines . Pt more visible on the unit and interacting with a few bilingual peers and staff.  The pt reported ongoing poor sleep in part due to other unit disruptions. The pt  appears less overtly depressed but more anxious appearing with constricted affect and with soft coherent speech . The pt spoke of his family in [facility] and of his strong support system here in [place]. The pt acknowledged his longstanding h/o ETOH  use d/o along with comorbid cocaine  and THC use  and he has been told by family and by hospital staff of the synergistic CNS depressant effects of ETOH along with worsening depression in the context of cocaine abrupt discontinuation. The pt has been talking with his family who remain very supportive  of the pt. The pt reporting eating alright and he is tolerating the newly begun low dose SSRI Zoloft at 25 mg now increased to 50 mg po q am in order to alleviate the pt's dual diagnosis mood and substance use disorders.     The pt has been encouraged to attend therapy groups especially those with a focus on substance use d/o treatment. The pt denies current SI /HI and he has no h/o psychosis. CIWA as above with most recent score =0. The pt reports motivation to remain clean and sober after discharge with substance use d/o treatment planning ongoing. Writer reviewed with the pt the pt's elevated HgA1C = 6.5 and plan to have hospitalist reconsult with plan for Insulin sliding scale and further follow up outpatient.    Pt amenable to plan    02/28/2022: Patient was seen today AM, chart reviewed and discussed in team. He is meds compliant and seems to be coasting well with the meds given. Overall OK and with limited participation in unit activities, no PRN needed, good BP control with Metoprolol. Tolerating Zoloft well.    03/01/2022: Patient was seen today AM, no acute issues, endorses that he would like to stay for a long period if needed for adequate stability. Tolerating  meds well with fair to good sleep/appetite. He has a Cardiac Pacemaker and is due for checkup tomorrow. Cardiology Consult for EP done , patient to go down tomorrow to the 4th floor here at  for pacemaker adjustment.    03/02/2022: Patient was seen today AM, chart reviewed and discussed in team. Mood seems OK, no acute issues need to go to 4 th floor for Pacemaker function. will do tomorrow, no other acute issues prefers to got rehab, SW are.    03/03/2022: Patient was seen today AM, chart reviewed and discussed in team. He is meds compliant and seems to be doing very well on meds prescribed. Seen by Cardiology and has no active recommendation. HTN in control with Metoprolol, TTE- Advise to stop drinking , Pacemaker in function. To apply for rehab as pt, prefers, SW to start rehab processing.    03/04/2022: Patient was seen today AM, chart reviewed and discussed in team. He was in bed and woke up on mentioning his name. He endorses that he feels better and has been meds compliant all along and also prefers to have rehab rather in-patient as he fells that he needs it. He was also suggested by Cardiology to avoid ETOH, so going for rehab would be the best choice. No meds changes for now, cleared from Cardiology and mentally feels OK, to go to rehab.    03/07/2022: Patient was seen today AM, chart reviewed and discussed in team. Mood is OK, medically seems OK and has been meds compliant with no issues, need to go to rehab for stability/safety. SW aware and faxed paper work for acceptance to different programs, awaiting response. He has hx of ETOH abuse in the past . No meds changes

## 2022-03-08 PROCEDURE — 99231 SBSQ HOSP IP/OBS SF/LOW 25: CPT

## 2022-03-08 PROCEDURE — 93010 ELECTROCARDIOGRAM REPORT: CPT

## 2022-03-08 RX ADMIN — Medication 50 MILLIGRAM(S): at 09:27

## 2022-03-08 RX ADMIN — SERTRALINE 50 MILLIGRAM(S): 25 TABLET, FILM COATED ORAL at 09:27

## 2022-03-08 RX ADMIN — Medication 1 TABLET(S): at 09:27

## 2022-03-08 RX ADMIN — Medication 100 MILLIGRAM(S): at 09:27

## 2022-03-08 RX ADMIN — Medication 81 MILLIGRAM(S): at 09:27

## 2022-03-08 RX ADMIN — Medication 50 MILLIGRAM(S): at 21:30

## 2022-03-08 RX ADMIN — Medication 1 MILLIGRAM(S): at 09:27

## 2022-03-08 NOTE — PROGRESS NOTE BEHAVIORAL HEALTH - NSBHFUPINTERVALHXFT_PSY_A_CORE
Covering MD Note( 2/27/2022)    Pt seen in the day room. Neatly attired and with good eye contact.  Pt wearing a face mask as per CDC guidelines . Pt more visible on the unit and interacting with a few bilingual peers and staff.  The pt reported ongoing poor sleep in part due to other unit disruptions. The pt  appears less overtly depressed but more anxious appearing with constricted affect and with soft coherent speech . The pt spoke of his family in [facility] and of his strong support system here in [place]. The pt acknowledged his longstanding h/o ETOH  use d/o along with comorbid cocaine  and THC use  and he has been told by family and by hospital staff of the synergistic CNS depressant effects of ETOH along with worsening depression in the context of cocaine abrupt discontinuation. The pt has been talking with his family who remain very supportive  of the pt. The pt reporting eating alright and he is tolerating the newly begun low dose SSRI Zoloft at 25 mg now increased to 50 mg po q am in order to alleviate the pt's dual diagnosis mood and substance use disorders.     The pt has been encouraged to attend therapy groups especially those with a focus on substance use d/o treatment. The pt denies current SI /HI and he has no h/o psychosis. CIWA as above with most recent score =0. The pt reports motivation to remain clean and sober after discharge with substance use d/o treatment planning ongoing. Writer reviewed with the pt the pt's elevated HgA1C = 6.5 and plan to have hospitalist reconsult with plan for Insulin sliding scale and further follow up outpatient.    Pt amenable to plan    02/28/2022: Patient was seen today AM, chart reviewed and discussed in team. He is meds compliant and seems to be coasting well with the meds given. Overall OK and with limited participation in unit activities, no PRN needed, good BP control with Metoprolol. Tolerating Zoloft well.    03/01/2022: Patient was seen today AM, no acute issues, endorses that he would like to stay for a long period if needed for adequate stability. Tolerating  meds well with fair to good sleep/appetite. He has a Cardiac Pacemaker and is due for checkup tomorrow. Cardiology Consult for EP done , patient to go down tomorrow to the 4th floor here at  for pacemaker adjustment.    03/02/2022: Patient was seen today AM, chart reviewed and discussed in team. Mood seems OK, no acute issues need to go to 4 th floor for Pacemaker function. will do tomorrow, no other acute issues prefers to got rehab, SW are.    03/03/2022: Patient was seen today AM, chart reviewed and discussed in team. He is meds compliant and seems to be doing very well on meds prescribed. Seen by Cardiology and has no active recommendation. HTN in control with Metoprolol, TTE- Advise to stop drinking , Pacemaker in function. To apply for rehab as pt, prefers, SW to start rehab processing.    03/04/2022: Patient was seen today AM, chart reviewed and discussed in team. He was in bed and woke up on mentioning his name. He endorses that he feels better and has been meds compliant all along and also prefers to have rehab rather in-patient as he fells that he needs it. He was also suggested by Cardiology to avoid ETOH, so going for rehab would be the best choice. No meds changes for now, cleared from Cardiology and mentally feels OK, to go to rehab.    03/07/2022: Patient was seen today AM, chart reviewed and discussed in team. Mood is OK, medically seems OK and has been meds compliant with no issues, need to go to rehab for stability/safety. SW aware and faxed paper work for acceptance to different programs, awaiting response. He has hx of ETOH abuse in the past . No meds changes    03/08/2022: Patient was seen today AM, chart reviewed and discussed in team. 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 Overall improved and to go for rehab at [facility]. Covering MD Note( 2/27/2022)    Pt seen in the day room. Neatly attired and with good eye contact.  Pt wearing a face mask as per CDC guidelines . Pt more visible on the unit and interacting with a few bilingual peers and staff.  The pt reported ongoing poor sleep in part due to other unit disruptions. The pt  appears less overtly depressed but more anxious appearing with constricted affect and with soft coherent speech . The pt spoke of his family in [facility] and of his strong support system here in [place]. The pt acknowledged his longstanding h/o ETOH  use d/o along with comorbid cocaine  and THC use  and he has been told by family and by hospital staff of the synergistic CNS depressant effects of ETOH along with worsening depression in the context of cocaine abrupt discontinuation. The pt has been talking with his family who remain very supportive  of the pt. The pt reporting eating alright and he is tolerating the newly begun low dose SSRI Zoloft at 25 mg now increased to 50 mg po q am in order to alleviate the pt's dual diagnosis mood and substance use disorders.     The pt has been encouraged to attend therapy groups especially those with a focus on substance use d/o treatment. The pt denies current SI /HI and he has no h/o psychosis. CIWA as above with most recent score =0. The pt reports motivation to remain clean and sober after discharge with substance use d/o treatment planning ongoing. Writer reviewed with the pt the pt's elevated HgA1C = 6.5 and plan to have hospitalist reconsult with plan for Insulin sliding scale and further follow up outpatient.    Pt amenable to plan    02/28/2022: Patient was seen today AM, chart reviewed and discussed in team. He is meds compliant and seems to be coasting well with the meds given. Overall OK and with limited participation in unit activities, no PRN needed, good BP control with Metoprolol. Tolerating Zoloft well.    03/01/2022: Patient was seen today AM, no acute issues, endorses that he would like to stay for a long period if needed for adequate stability. Tolerating  meds well with fair to good sleep/appetite. He has a Cardiac Pacemaker and is due for checkup tomorrow. Cardiology Consult for EP done , patient to go down tomorrow to the 4th floor here at  for pacemaker adjustment.    03/02/2022: Patient was seen today AM, chart reviewed and discussed in team. Mood seems OK, no acute issues need to go to 4 th floor for Pacemaker function. will do tomorrow, no other acute issues prefers to got rehab, SW are.    03/03/2022: Patient was seen today AM, chart reviewed and discussed in team. He is meds compliant and seems to be doing very well on meds prescribed. Seen by Cardiology and has no active recommendation. HTN in control with Metoprolol, TTE- Advise to stop drinking , Pacemaker in function. To apply for rehab as pt, prefers, SW to start rehab processing.    03/04/2022: Patient was seen today AM, chart reviewed and discussed in team. He was in bed and woke up on mentioning his name. He endorses that he feels better and has been meds compliant all along and also prefers to have rehab rather in-patient as he fells that he needs it. He was also suggested by Cardiology to avoid ETOH, so going for rehab would be the best choice. No meds changes for now, cleared from Cardiology and mentally feels OK, to go to rehab.    03/07/2022: Patient was seen today AM, chart reviewed and discussed in team. Mood is OK, medically seems OK and has been meds compliant with no issues, need to go to rehab for stability/safety. SW aware and faxed paper work for acceptance to different programs, awaiting response. He has hx of ETOH abuse in the past . No meds changes    03/08/2022: Patient was seen today AM, chart reviewed and discussed in team. 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 Overall improved and to go for rehab at [facility]. He has no shakes from withdrawal and has not been scoring for CIWA for past few days, CIWA has been discontinued.

## 2022-03-09 LAB — SARS-COV-2 RNA SPEC QL NAA+PROBE: SIGNIFICANT CHANGE UP

## 2022-03-09 PROCEDURE — 99231 SBSQ HOSP IP/OBS SF/LOW 25: CPT

## 2022-03-09 RX ADMIN — Medication 81 MILLIGRAM(S): at 09:27

## 2022-03-09 RX ADMIN — Medication 1 MILLIGRAM(S): at 09:27

## 2022-03-09 RX ADMIN — Medication 100 MILLIGRAM(S): at 09:27

## 2022-03-09 RX ADMIN — SERTRALINE 50 MILLIGRAM(S): 25 TABLET, FILM COATED ORAL at 09:28

## 2022-03-09 RX ADMIN — Medication 50 MILLIGRAM(S): at 09:27

## 2022-03-09 RX ADMIN — Medication 50 MILLIGRAM(S): at 21:41

## 2022-03-09 RX ADMIN — Medication 1 APPLICATION(S): at 21:42

## 2022-03-09 RX ADMIN — Medication 1 TABLET(S): at 10:10

## 2022-03-09 NOTE — PROGRESS NOTE BEHAVIORAL HEALTH - NSBHFUPINTERVALHXFT_PSY_A_CORE
Covering MD Note( 2/27/2022)    Pt seen in the day room. Neatly attired and with good eye contact.  Pt wearing a face mask as per CDC guidelines . Pt more visible on the unit and interacting with a few bilingual peers and staff.  The pt reported ongoing poor sleep in part due to other unit disruptions. The pt  appears less overtly depressed but more anxious appearing with constricted affect and with soft coherent speech . The pt spoke of his family in [facility] and of his strong support system here in [place]. The pt acknowledged his longstanding h/o ETOH  use d/o along with comorbid cocaine  and THC use  and he has been told by family and by hospital staff of the synergistic CNS depressant effects of ETOH along with worsening depression in the context of cocaine abrupt discontinuation. The pt has been talking with his family who remain very supportive  of the pt. The pt reporting eating alright and he is tolerating the newly begun low dose SSRI Zoloft at 25 mg now increased to 50 mg po q am in order to alleviate the pt's dual diagnosis mood and substance use disorders.     The pt has been encouraged to attend therapy groups especially those with a focus on substance use d/o treatment. The pt denies current SI /HI and he has no h/o psychosis. CIWA as above with most recent score =0. The pt reports motivation to remain clean and sober after discharge with substance use d/o treatment planning ongoing. Writer reviewed with the pt the pt's elevated HgA1C = 6.5 and plan to have hospitalist reconsult with plan for Insulin sliding scale and further follow up outpatient.    Pt amenable to plan    02/28/2022: Patient was seen today AM, chart reviewed and discussed in team. He is meds compliant and seems to be coasting well with the meds given. Overall OK and with limited participation in unit activities, no PRN needed, good BP control with Metoprolol. Tolerating Zoloft well.    03/01/2022: Patient was seen today AM, no acute issues, endorses that he would like to stay for a long period if needed for adequate stability. Tolerating  meds well with fair to good sleep/appetite. He has a Cardiac Pacemaker and is due for checkup tomorrow. Cardiology Consult for EP done , patient to go down tomorrow to the 4th floor here at  for pacemaker adjustment.    03/02/2022: Patient was seen today AM, chart reviewed and discussed in team. Mood seems OK, no acute issues need to go to 4 th floor for Pacemaker function. will do tomorrow, no other acute issues prefers to got rehab, SW are.    03/03/2022: Patient was seen today AM, chart reviewed and discussed in team. He is meds compliant and seems to be doing very well on meds prescribed. Seen by Cardiology and has no active recommendation. HTN in control with Metoprolol, TTE- Advise to stop drinking , Pacemaker in function. To apply for rehab as pt, prefers, SW to start rehab processing.    03/04/2022: Patient was seen today AM, chart reviewed and discussed in team. He was in bed and woke up on mentioning his name. He endorses that he feels better and has been meds compliant all along and also prefers to have rehab rather in-patient as he fells that he needs it. He was also suggested by Cardiology to avoid ETOH, so going for rehab would be the best choice. No meds changes for now, cleared from Cardiology and mentally feels OK, to go to rehab.    03/07/2022: Patient was seen today AM, chart reviewed and discussed in team. Mood is OK, medically seems OK and has been meds compliant with no issues, need to go to rehab for stability/safety. SW aware and faxed paper work for acceptance to different programs, awaiting response. He has hx of ETOH abuse in the past . No meds changes    03/08/2022: Patient was seen today AM, chart reviewed and discussed in team. 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 Overall improved and to go for rehab at [facility]. He has no shakes from withdrawal and has not been scoring for CIWA for past few days, CIWA has been discontinued.    03/09/2022: Patient was seen today AM, chart reviewed and discussed in team. He is in hallway on the phone. Dressed clean with fair eye contact. He was informed of accepting at [facility] for rehab and probable date on 03/10/2022, he was advised that due to his heart condition he should refrain from ETOH abuse. Not depressed and has good sleep/appetite, limited participation in groups noted. To continue meds as ordered, no changes needed. Medically stable as well.

## 2022-03-10 PROCEDURE — 99231 SBSQ HOSP IP/OBS SF/LOW 25: CPT

## 2022-03-10 RX ADMIN — Medication 50 MILLIGRAM(S): at 20:50

## 2022-03-10 RX ADMIN — Medication 1 TABLET(S): at 09:13

## 2022-03-10 RX ADMIN — Medication 100 MILLIGRAM(S): at 09:13

## 2022-03-10 RX ADMIN — Medication 81 MILLIGRAM(S): at 09:12

## 2022-03-10 RX ADMIN — Medication 50 MILLIGRAM(S): at 09:13

## 2022-03-10 RX ADMIN — SERTRALINE 50 MILLIGRAM(S): 25 TABLET, FILM COATED ORAL at 09:13

## 2022-03-10 RX ADMIN — Medication 1 MILLIGRAM(S): at 09:12

## 2022-03-10 RX ADMIN — Medication 1 APPLICATION(S): at 20:49

## 2022-03-10 NOTE — PROGRESS NOTE BEHAVIORAL HEALTH - NSBHFUPINTERVALHXFT_PSY_A_CORE
Covering MD Note( 2/27/2022)    Pt seen in the day room. Neatly attired and with good eye contact.  Pt wearing a face mask as per CDC guidelines . Pt more visible on the unit and interacting with a few bilingual peers and staff.  The pt reported ongoing poor sleep in part due to other unit disruptions. The pt  appears less overtly depressed but more anxious appearing with constricted affect and with soft coherent speech . The pt spoke of his family in [facility] and of his strong support system here in [place]. The pt acknowledged his longstanding h/o ETOH  use d/o along with comorbid cocaine  and THC use  and he has been told by family and by hospital staff of the synergistic CNS depressant effects of ETOH along with worsening depression in the context of cocaine abrupt discontinuation. The pt has been talking with his family who remain very supportive  of the pt. The pt reporting eating alright and he is tolerating the newly begun low dose SSRI Zoloft at 25 mg now increased to 50 mg po q am in order to alleviate the pt's dual diagnosis mood and substance use disorders.     The pt has been encouraged to attend therapy groups especially those with a focus on substance use d/o treatment. The pt denies current SI /HI and he has no h/o psychosis. CIWA as above with most recent score =0. The pt reports motivation to remain clean and sober after discharge with substance use d/o treatment planning ongoing. Writer reviewed with the pt the pt's elevated HgA1C = 6.5 and plan to have hospitalist reconsult with plan for Insulin sliding scale and further follow up outpatient.    Pt amenable to plan    02/28/2022: Patient was seen today AM, chart reviewed and discussed in team. He is meds compliant and seems to be coasting well with the meds given. Overall OK and with limited participation in unit activities, no PRN needed, good BP control with Metoprolol. Tolerating Zoloft well.    03/01/2022: Patient was seen today AM, no acute issues, endorses that he would like to stay for a long period if needed for adequate stability. Tolerating  meds well with fair to good sleep/appetite. He has a Cardiac Pacemaker and is due for checkup tomorrow. Cardiology Consult for EP done , patient to go down tomorrow to the 4th floor here at  for pacemaker adjustment.    03/02/2022: Patient was seen today AM, chart reviewed and discussed in team. Mood seems OK, no acute issues need to go to 4 th floor for Pacemaker function. will do tomorrow, no other acute issues prefers to got rehab, SW are.    03/03/2022: Patient was seen today AM, chart reviewed and discussed in team. He is meds compliant and seems to be doing very well on meds prescribed. Seen by Cardiology and has no active recommendation. HTN in control with Metoprolol, TTE- Advise to stop drinking , Pacemaker in function. To apply for rehab as pt, prefers, SW to start rehab processing.    03/04/2022: Patient was seen today AM, chart reviewed and discussed in team. He was in bed and woke up on mentioning his name. He endorses that he feels better and has been meds compliant all along and also prefers to have rehab rather in-patient as he fells that he needs it. He was also suggested by Cardiology to avoid ETOH, so going for rehab would be the best choice. No meds changes for now, cleared from Cardiology and mentally feels OK, to go to rehab.    03/07/2022: Patient was seen today AM, chart reviewed and discussed in team. Mood is OK, medically seems OK and has been meds compliant with no issues, need to go to rehab for stability/safety. SW aware and faxed paper work for acceptance to different programs, awaiting response. He has hx of ETOH abuse in the past . No meds changes    03/08/2022: Patient was seen today AM, chart reviewed and discussed in team. 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 Overall improved and to go for rehab at [facility]. He has no shakes from withdrawal and has not been scoring for CIWA for past few days, CIWA has been discontinued.    03/09/2022: Patient was seen today AM, chart reviewed and discussed in team. He is in hallway on the phone. Dressed clean with fair eye contact. He was informed of accepting at [facility] for rehab and probable date on 03/10/2022, he was advised that due to his heart condition he should refrain from ETOH abuse. Not depressed and has good sleep/appetite, limited participation in groups noted. To continue meds as ordered, no changes needed. Medically stable as well.    03/10/2022: Patient was seen today AM, chart reviewed and discussed in team. he prefers to stay out of trouble, limited to moderate group participation, somewhat depressed, but speaks well and wants to go to rehab so he may be able to control himself and move on. He is now concerned with Lisinopril, as he has HTN with Pre-diabetes. Eager to go to [facility] for rehab and was advised again to stay away from THC/Cocaine.

## 2022-03-11 ENCOUNTER — APPOINTMENT (OUTPATIENT)
Dept: ELECTROPHYSIOLOGY | Facility: CLINIC | Age: 42
End: 2022-03-11

## 2022-03-11 VITALS — TEMPERATURE: 98 F | RESPIRATION RATE: 18 BRPM | OXYGEN SATURATION: 100 %

## 2022-03-11 PROCEDURE — 99239 HOSP IP/OBS DSCHRG MGMT >30: CPT

## 2022-03-11 RX ORDER — HYDROXYZINE HCL 10 MG
1 TABLET ORAL
Qty: 0 | Refills: 0 | DISCHARGE

## 2022-03-11 RX ORDER — THIAMINE MONONITRATE (VIT B1) 100 MG
1 TABLET ORAL
Qty: 0 | Refills: 0 | DISCHARGE
Start: 2022-03-11

## 2022-03-11 RX ORDER — ASPIRIN/CALCIUM CARB/MAGNESIUM 324 MG
1 TABLET ORAL
Qty: 0 | Refills: 0 | DISCHARGE
Start: 2022-03-11

## 2022-03-11 RX ORDER — HYDROCORTISONE 1 %
1 OINTMENT (GRAM) TOPICAL
Qty: 0 | Refills: 0 | DISCHARGE
Start: 2022-03-11

## 2022-03-11 RX ORDER — FOLIC ACID 0.8 MG
1 TABLET ORAL
Qty: 0 | Refills: 0 | DISCHARGE
Start: 2022-03-11

## 2022-03-11 RX ORDER — TETRAHYDROZOLINE/POLYETHYL GLY 0.05 %-1 %
1 DROPS OPHTHALMIC (EYE)
Qty: 0 | Refills: 0 | DISCHARGE

## 2022-03-11 RX ORDER — ACETAMINOPHEN 500 MG
2 TABLET ORAL
Qty: 0 | Refills: 0 | DISCHARGE
Start: 2022-03-11

## 2022-03-11 RX ORDER — METOPROLOL TARTRATE 50 MG
1 TABLET ORAL
Qty: 0 | Refills: 0 | DISCHARGE
Start: 2022-03-11

## 2022-03-11 RX ORDER — SERTRALINE 25 MG/1
1 TABLET, FILM COATED ORAL
Qty: 0 | Refills: 0 | DISCHARGE
Start: 2022-03-11

## 2022-03-11 RX ORDER — ASPIRIN/CALCIUM CARB/MAGNESIUM 324 MG
1 TABLET ORAL
Qty: 0 | Refills: 0 | DISCHARGE

## 2022-03-11 RX ORDER — THIAMINE MONONITRATE (VIT B1) 100 MG
1 TABLET ORAL
Qty: 0 | Refills: 0 | DISCHARGE

## 2022-03-11 RX ADMIN — Medication 100 MILLIGRAM(S): at 08:52

## 2022-03-11 RX ADMIN — Medication 81 MILLIGRAM(S): at 08:51

## 2022-03-11 RX ADMIN — SERTRALINE 50 MILLIGRAM(S): 25 TABLET, FILM COATED ORAL at 08:51

## 2022-03-11 RX ADMIN — Medication 1 MILLIGRAM(S): at 08:51

## 2022-03-11 RX ADMIN — Medication 50 MILLIGRAM(S): at 08:52

## 2022-03-11 RX ADMIN — Medication 1 TABLET(S): at 08:51

## 2022-03-11 NOTE — DISCHARGE NOTE BEHAVIORAL HEALTH - NSBHDCRESOURCESOTHERFT_PSY_A_CORE
ZABRINA DASH- for psychiatric crises (available AFTER HOURS)  24/7 hotline: 399.910.2479  Address: [address] [place] [address] [address], [place] [address]    Mood Disorder Support Group [facility]:   Free Zoom Support Groups:   https://www.G-Snap!/zoommeetings    2-623-768-SANDRA (4977) or info@sandra.org      Substance Use Resources    SMART Recovery Groups  *Can meet online   https://www.smartrecovery.org     Find a local AA group:  [facility]  [email or web address]  [phone]

## 2022-03-11 NOTE — PROGRESS NOTE BEHAVIORAL HEALTH - MUSCLE TONE / STRENGTH
Normal muscle tone/strength
H/O excision of mass  left thigh benign  History of drainage of abscess  2012 anorectal  History of excision of mass  right leg 6/2018  S/P colonoscopy  2012  S/P hernia repair
Normal muscle tone/strength

## 2022-03-11 NOTE — PROGRESS NOTE BEHAVIORAL HEALTH - DETAILS
Dilated Cardiomyopathy; HTN, r/o DM
Dilated Cardio-Myopathy; HTN
Dilated Cardiomyopathy; HTN, r/o DM
Dilated Cardiomyopathy; HTN
Dilated Cardiomyopathy; HTN, r/o DM

## 2022-03-11 NOTE — DISCHARGE NOTE BEHAVIORAL HEALTH - NSBHDCALCOHOLREFERFT_PSY_A_CORE
Pt to address any issues related to substance abuse in inpatient substance abuse treatment with [facility].

## 2022-03-11 NOTE — PROGRESS NOTE BEHAVIORAL HEALTH - NSBHADMITIPOBSFT_PSY_A_CORE
Not S/H now

## 2022-03-11 NOTE — PROGRESS NOTE BEHAVIORAL HEALTH - PRIMARY DX
Substance induced mood disorder

## 2022-03-11 NOTE — PROGRESS NOTE BEHAVIORAL HEALTH - AXIS III
HTN, dilated nonischemic cardiomyopathy, pacemaker   r/o DM ( reconsult of hospitalist)
HTN, dilated nonischemic cardiomyopathy, pacemaker
HTN, dilated nonischemic cardiomyopathy, pacemaker   r/o DM ( reconsult of hospitalist)
HTN, dilated nonischemic cardiomyopathy, pacemaker

## 2022-03-11 NOTE — DISCHARGE NOTE BEHAVIORAL HEALTH - REASON FOR ADMISSION
Per pt's ED BH Assessment completed on 2/24/22 by SUMIT Velásquez: "I have been thinking about killing myself."

## 2022-03-11 NOTE — PROGRESS NOTE BEHAVIORAL HEALTH - NSBHFUPTYPE_PSY_A_CORE
Inpatient
Inpatient
Inpatient-On Service Note
Inpatient

## 2022-03-11 NOTE — DISCHARGE NOTE BEHAVIORAL HEALTH - CONDITIONS AT DISCHARGE
Patient alert, oriented x3. Therapist and nurse reviewed safety plan with patient who denies any thoughts to self harm or others. SW and nurse reviewed discharge plan with patient who is in agreement with plan. Patient is going to inpatient Rehab at [facility]. All belongings returned to patient.

## 2022-03-11 NOTE — PROGRESS NOTE BEHAVIORAL HEALTH - ESTIMATED DISCHARGE DATE
04-Mar-2022

## 2022-03-11 NOTE — PROGRESS NOTE BEHAVIORAL HEALTH - NSBHPTASSESSDT_PSY_A_CORE
26-Feb-2022
25-Feb-2022 12:23
28-Feb-2022 12:22
11-Mar-2022 11:33
07-Mar-2022 12:47
10-Mar-2022 11:22
02-Mar-2022 17:20
27-Feb-2022
09-Mar-2022 13:07
04-Mar-2022 11:02
01-Mar-2022 12:34
03-Mar-2022 16:48
08-Mar-2022 11:36

## 2022-03-11 NOTE — PROGRESS NOTE BEHAVIORAL HEALTH - PROBLEM SELECTOR PLAN 1
1. Start Zoloft 25 mg daily and titrated to Zoloft 50 mg daily  2.  to avoid ETOH/Cocaine  3. Rehab for further consolidation of intent to avoid ETOH/Cocaine  4. Discharge planning ongoing
1. Start Zoloft 25 mg daily and titrated to Zoloft 50 mg daily  2.  to avoid ETOH/Cocaine  3. Rehab for further consolidation of intent to avoid ETOH/Cocaine  4. Discharge planning ongoing

## 2022-03-11 NOTE — PROGRESS NOTE BEHAVIORAL HEALTH - THOUGHT PROCESS
Linear/Normal reasoning

## 2022-03-11 NOTE — PROGRESS NOTE BEHAVIORAL HEALTH - NSBHCHARTREVIEWINVESTIGATE_PSY_A_CORE FT
< from: 12 Lead ECG (12.07.21 @ 17:42) >    Ventricular Rate 94 BPM    Atrial Rate 94 BPM    P-R Interval 152 ms    QRS Duration 148 ms    Q-T Interval 432 ms    QTC Calculation(Bazett) 540 ms    P Axis 37 degrees    R Axis 263 degrees    T Axis 46 degrees    Diagnosis Line Atrial-sensed ventricular-paced rhythm  Biventricular pacemaker detected  Abnormal ECG  When compared with ECG of 18-NOV-2021 17:18,  Vent. rate has increased BY  23 BPM  Confirmed by YUMI THOMASON, BANDAR ([id number]) on 12/8/2021 8:21:20 AM    < end of copied text >
< from: 12 Lead ECG (12.07.21 @ 17:42) >    Ventricular Rate 94 BPM    Atrial Rate 94 BPM    P-R Interval 152 ms    QRS Duration 148 ms    Q-T Interval 432 ms    QTC Calculation(Bazett) 540 ms    P Axis 37 degrees    R Axis 263 degrees    T Axis 46 degrees    Diagnosis Line Atrial-sensed ventricular-paced rhythm  Biventricular pacemaker detected  Abnormal ECG  When compared with ECG of 18-NOV-2021 17:18,  Vent. rate has increased BY  23 BPM  Confirmed by UYMI THOMASON, BANDAR ([id number]) on 12/8/2021 8:21:20 AM    < end of copied text >
< from: 12 Lead ECG (12.07.21 @ 17:42) >    Ventricular Rate 94 BPM    Atrial Rate 94 BPM    P-R Interval 152 ms    QRS Duration 148 ms    Q-T Interval 432 ms    QTC Calculation(Bazett) 540 ms    P Axis 37 degrees    R Axis 263 degrees    T Axis 46 degrees    Diagnosis Line Atrial-sensed ventricular-paced rhythm  Biventricular pacemaker detected  Abnormal ECG  When compared with ECG of 18-NOV-2021 17:18,  Vent. rate has increased BY  23 BPM  Confirmed by YUMI THOMASON, BANDAR ([id number]) on 12/8/2021 8:21:20 AM    < end of copied text >

## 2022-03-11 NOTE — DISCHARGE NOTE BEHAVIORAL HEALTH - NSBHDCSUBSTHXFT_PSY_A_CORE
Per pt's 5N hx, pt attended inpt rehab in December of 2021 for 1 month (SAN[facility]). Pt was connected to outpatient treatment at [facility] in [place], but only attended 2 -3 sessions before he began drinking again. Per pt's 5N hx, pt reports he began struggling with ETOH abuse for the last 5 years. Pt reports he drinks 1 bottle of vodka a day and uses cocaine 1x a week.

## 2022-03-11 NOTE — DISCHARGE NOTE BEHAVIORAL HEALTH - HPI (INCLUDE ILLNESS QUALITY, SEVERITY, DURATION, TIMING, CONTEXT, MODIFYING FACTORS, ASSOCIATED SIGNS AND SYMPTOMS)
Per pt's ED BH Assessment completed on 2/24/22 by SUMIT Velásquez: "42 year-old  male, domiciled with family, unemployed for past several years, from [facility] (English speaking), PMH HTN, dilated nonischemic cardiomyopathy, pacemaker, history of alcohol abuse (recent relapse 1.10.2022 - 2.22.2022), history of cocaine use, history detox a couple of times, history of substance abuse rehabilitation (last 12.2022), no suicidal ideation or suicide attempt, presenting with depression with suicidal ideation.    Patient is alert and oriented to person, time, place and situation. Patient is calm and cooperative, pleasant; linear, organized, with no evidence of thought disorder. Patient complaining of severe depressive symptoms, with persistent sad mood, hopelessness, anhedonia, insomnia, difficulty with concentration, amotivation, anergia. Reports recently starting having suicidal ideation (no plan or intent), exacerbated by grief (last 5 cousins: 3 in [place] and 2 in [facility] secondary to COVID-19). Denies manic / psychotic symptoms. Of note, reports auditory hallucination when intoxicated. No delusions elicited. Reports relapsing on alcohol 1.2022 until this past Tuesday, drinking daily. Denies withdrawals however in last 24 hours. Denies prior seizures. Reports wanting inpatient psychiatric treatment prior to rehabilitation secondary to severe depressive symptoms with hopelessness and suicidal ideation." Per pt's ED BH Assessment completed on 2/24/22 by SUMIT Velásquez: "42 year-old  male, domiciled with family, unemployed for past several years, from [facility] (English speaking), PMH HTN, dilated nonischemic cardiomyopathy, pacemaker, history of alcohol abuse (recent relapse 1.10.2022 - 2.22.2022), history of cocaine use, history detox a couple of times, history of substance abuse rehabilitation (last 12.2022), no suicidal ideation or suicide attempt, presenting with depression with suicidal ideation.    Patient is alert and oriented to person, time, place and situation. Patient is calm and cooperative, pleasant; linear, organized, with no evidence of thought disorder. Patient complaining of severe depressive symptoms, with persistent sad mood, hopelessness, anhedonia, insomnia, difficulty with concentration, amotivation, anergia. Reports recently starting having suicidal ideation (no plan or intent), exacerbated by grief (last 5 cousins: 3 in [place] and 2 in [facility] secondary to COVID-19). Denies manic / psychotic symptoms. Of note, reports auditory hallucination when intoxicated. No delusions elicited. Reports relapsing on alcohol 1.2022 until this past Tuesday, drinking daily. Denies withdrawals however in last 24 hours. Denies prior seizures. Reports wanting inpatient psychiatric treatment prior to rehabilitation secondary to severe depressive symptoms with hopelessness and suicidal ideation."    Interval Hx: 41 y/o male, admitted voluntarily fro ED at  as he expressed SI. He was non-compliant with treatment and after care with [facility] after few sessions. He presented Depressed, sad relapsed on drinking ETOH with occasional Cocaine and has been struggling in-spite of knowing that he has Non-Ischemic Dilated Cardiomyopathy with ICD for his heart. He wanted to start treatment for mood before he wants to proceed for Rehab again. He was stared on Zoloft 25 mg daily , with titration to Zoloft 50 mg daily. He did express SI on admission, but has no prior SA. He did well on Zoloft 50 mg daily, and was able to communicate that he is OK with fair to good sleep/appetite. We did ot increase Zoloft dosage further as it seemed that the mood issues may also be alcohol induced. he was more visible in the evening rather than AM, as he prefers to stay in bed in AM. He was meds compliant and there were no instances of self harm noted anywhere during his stay.     Medically has HTN with Non-Ischemic Dilated Cardiomyopathy, and was seen by Cardiology and was cleared. ICD function adequate, TTE was WNL on 11/2021. Recommendation, per cardiology was to continue Metoprolol and to avoid ETOH.

## 2022-03-11 NOTE — PROGRESS NOTE BEHAVIORAL HEALTH - NSBHROSSYSTEMS_PSY_ALL_CORE
Cardiovascular...

## 2022-03-11 NOTE — PROGRESS NOTE BEHAVIORAL HEALTH - NSBHCHARTREVIEWVS_PSY_A_CORE FT
Vital Signs Last 24 Hrs  T(C): 36.9 (08 Mar 2022 07:39), Max: 36.9 (08 Mar 2022 07:39)  T(F): 98.5 (08 Mar 2022 07:39), Max: 98.5 (08 Mar 2022 07:39)  HR: --  BP: --  BP(mean): --  RR: 16 (08 Mar 2022 07:39) (16 - 16)  SpO2: 100% (08 Mar 2022 07:39)
Vital Signs Last 24 Hrs  T(C): 36.7 (11 Mar 2022 07:13), Max: 36.7 (11 Mar 2022 07:13)  T(F): 98.1 (11 Mar 2022 07:13), Max: 98.1 (11 Mar 2022 07:13)  HR: --  BP: --  BP(mean): --  RR: 18 (11 Mar 2022 07:13) (18 - 18)  SpO2: 100% (11 Mar 2022 07:13) (100% - 100%)
Vital Signs Last 24 Hrs  T(C): 36.4 (26 Feb 2022 08:55), Max: 36.9 (25 Feb 2022 20:24)  T(F): 97.5 (26 Feb 2022 08:55), Max: 98.5 (25 Feb 2022 20:24)  HR: --  BP: --  BP(mean): --  RR: 16 (26 Feb 2022 08:55) (16 - 18)  SpO2: 100% (26 Feb 2022 08:55) (98% - 100%)
Vital Signs Last 24 Hrs  T(C): 36.9 (02 Mar 2022 07:40), Max: 36.9 (02 Mar 2022 07:40)  T(F): 98.4 (02 Mar 2022 07:40), Max: 98.4 (02 Mar 2022 07:40)  HR: --  BP: --  BP(mean): --  RR: 12 (02 Mar 2022 07:40) (12 - 12)  SpO2: 99% (02 Mar 2022 07:40) (99% - 99%)
Vital Signs Last 24 Hrs  T(C): 36.9 (04 Mar 2022 07:23), Max: 36.9 (04 Mar 2022 07:23)  T(F): 98.4 (04 Mar 2022 07:23), Max: 98.4 (04 Mar 2022 07:23)  HR: --  BP: --  BP(mean): --  RR: 18 (04 Mar 2022 07:23) (16 - 18)  SpO2: 100% (04 Mar 2022 07:23) (100% - 100%)
Vital Signs Last 24 Hrs  T(C): 36.2 (25 Feb 2022 08:24), Max: 36.9 (25 Feb 2022 06:00)  T(F): 97.1 (25 Feb 2022 08:24), Max: 98.4 (25 Feb 2022 06:00)  HR: 91 (24 Feb 2022 22:44) (86 - 91)  BP: 139/96 (24 Feb 2022 22:44) (139/96 - 142/100)  BP(mean): 112 (24 Feb 2022 16:40) (112 - 112)  RR: 16 (25 Feb 2022 08:24) (16 - 20)  SpO2: 100% (25 Feb 2022 08:24) (99% - 100%)
Vital Signs Last 24 Hrs  T(C): 36.3 (27 Feb 2022 12:20), Max: 37.2 (27 Feb 2022 07:33)  T(F): 97.3 (27 Feb 2022 12:20), Max: 99 (27 Feb 2022 07:33)  HR: --  BP: --  BP(mean): --  RR: 15 (27 Feb 2022 12:20) (14 - 15)  SpO2: 98% (27 Feb 2022 12:20) (98% - 100%)
Vital Signs Last 24 Hrs  T(C): 36.8 (28 Feb 2022 07:19), Max: 36.8 (28 Feb 2022 07:19)  T(F): 98.2 (28 Feb 2022 07:19), Max: 98.2 (28 Feb 2022 07:19)  HR: --  BP: --  BP(mean): --  RR: 18 (28 Feb 2022 07:19) (16 - 18)  SpO2: 100% (27 Feb 2022 20:31) (100% - 100%)
Vital Signs Last 24 Hrs  T(C): 36.9 (03 Mar 2022 07:46), Max: 36.9 (03 Mar 2022 07:46)  T(F): 98.4 (03 Mar 2022 07:46), Max: 98.4 (03 Mar 2022 07:46)  HR: 74 (02 Mar 2022 19:57) (74 - 74)  BP: 133/86 (02 Mar 2022 19:57) (133/86 - 133/86)  BP(mean): --  RR: 16 (03 Mar 2022 07:46) (16 - 16)  SpO2: 100% (03 Mar 2022 07:46) (100% - 100%)
Vital Signs Last 24 Hrs  T(C): 36.4 (01 Mar 2022 07:41), Max: 36.4 (01 Mar 2022 07:41)  T(F): 97.5 (01 Mar 2022 07:41), Max: 97.5 (01 Mar 2022 07:41)  HR: --  BP: --  BP(mean): --  RR: 18 (01 Mar 2022 07:41) (18 - 18)  SpO2: 100% (01 Mar 2022 07:41) (100% - 100%)
Vital Signs Last 24 Hrs  T(C): 36.9 (07 Mar 2022 07:39), Max: 36.9 (07 Mar 2022 07:39)  T(F): 98.5 (07 Mar 2022 07:39), Max: 98.5 (07 Mar 2022 07:39)  HR: --  BP: --  BP(mean): --  RR: 16 (07 Mar 2022 07:39) (16 - 16)  SpO2: 100% (07 Mar 2022 07:39) (100% - 100%)
Vital Signs Last 24 Hrs  T(C): 36.8 (10 Mar 2022 07:38), Max: 36.8 (10 Mar 2022 07:38)  T(F): 98.3 (10 Mar 2022 07:38), Max: 98.3 (10 Mar 2022 07:38)  HR: --  BP: --  BP(mean): --  RR: 16 (10 Mar 2022 07:38) (16 - 16)  SpO2: 98% (10 Mar 2022 07:38) (98% - 98%)

## 2022-03-11 NOTE — DISCHARGE NOTE BEHAVIORAL HEALTH - NSBHDCCRISISPLAN2FT_PSY_A_CORE
Call [facility] 5N: [phone]  : Joanie Antoine LMSW  Psychiatrists- Dr. Marina Small

## 2022-03-11 NOTE — PROGRESS NOTE BEHAVIORAL HEALTH - NSBHCHARTREVIEWIMAGING_PSY_A_CORE FT
MEDICATIONS  (STANDING):  aspirin enteric coated 81 milliGRAM(s) Oral daily  folic acid 1 milliGRAM(s) Oral daily  metoprolol tartrate 50 milliGRAM(s) Oral two times a day  multivitamin 1 Tablet(s) Oral daily  sertraline 50 milliGRAM(s) Oral daily  thiamine 100 milliGRAM(s) Oral daily    MEDICATIONS  (PRN):  acetaminophen     Tablet .. 650 milliGRAM(s) Oral every 6 hours PRN Temp greater or equal to 38C (100.4F), Mild Pain (1 - 3)  diphenhydrAMINE Injectable 50 milliGRAM(s) IntraMuscular once PRN Extrapyramidal prophylaxis  haloperidol    Injectable 5 milliGRAM(s) IntraMuscular once PRN Psychotic Agitation SEVERE  LORazepam     Tablet 2 milliGRAM(s) Oral every 2 hours PRN CIWA-Ar score 8 or greater  LORazepam   Injectable 2 milliGRAM(s) IntraMuscular once PRN Anxiety SEVERE

## 2022-03-11 NOTE — PROGRESS NOTE BEHAVIORAL HEALTH - RISK ASSESSMENT
ACUTE RISK FACTORS: severe depressed mood, hopelessness, anhedonia, insomnia, suicidal ideation, alcohol abuse   CHRONIC RISK FACTORS: polysubstance abuse , medical comorbidities   PROTECTIVE FACTORS: motivation for psychiatric treatment   MITIGATION STRATEGIES: in-patient hospitalization

## 2022-03-11 NOTE — DISCHARGE NOTE BEHAVIORAL HEALTH - PROVIDER TOKENS
FREE:[LAST:[[facility]],PHONE:[(   )    -],FAX:[(   )    -],ADDRESS:[See SW note below for continued vaer care f/u]]

## 2022-03-11 NOTE — PROGRESS NOTE BEHAVIORAL HEALTH - NSBHFUPINTERVALHXFT_PSY_A_CORE
Covering MD Note( 2/27/2022)    Pt seen in the day room. Neatly attired and with good eye contact.  Pt wearing a face mask as per CDC guidelines . Pt more visible on the unit and interacting with a few bilingual peers and staff.  The pt reported ongoing poor sleep in part due to other unit disruptions. The pt  appears less overtly depressed but more anxious appearing with constricted affect and with soft coherent speech . The pt spoke of his family in [facility] and of his strong support system here in [place]. The pt acknowledged his longstanding h/o ETOH  use d/o along with comorbid cocaine  and THC use  and he has been told by family and by hospital staff of the synergistic CNS depressant effects of ETOH along with worsening depression in the context of cocaine abrupt discontinuation. The pt has been talking with his family who remain very supportive  of the pt. The pt reporting eating alright and he is tolerating the newly begun low dose SSRI Zoloft at 25 mg now increased to 50 mg po q am in order to alleviate the pt's dual diagnosis mood and substance use disorders.     The pt has been encouraged to attend therapy groups especially those with a focus on substance use d/o treatment. The pt denies current SI /HI and he has no h/o psychosis. CIWA as above with most recent score =0. The pt reports motivation to remain clean and sober after discharge with substance use d/o treatment planning ongoing. Writer reviewed with the pt the pt's elevated HgA1C = 6.5 and plan to have hospitalist reconsult with plan for Insulin sliding scale and further follow up outpatient.    Pt amenable to plan    02/28/2022: Patient was seen today AM, chart reviewed and discussed in team. He is meds compliant and seems to be coasting well with the meds given. Overall OK and with limited participation in unit activities, no PRN needed, good BP control with Metoprolol. Tolerating Zoloft well.    03/01/2022: Patient was seen today AM, no acute issues, endorses that he would like to stay for a long period if needed for adequate stability. Tolerating  meds well with fair to good sleep/appetite. He has a Cardiac Pacemaker and is due for checkup tomorrow. Cardiology Consult for EP done , patient to go down tomorrow to the 4th floor here at  for pacemaker adjustment.    03/02/2022: Patient was seen today AM, chart reviewed and discussed in team. Mood seems OK, no acute issues need to go to 4 th floor for Pacemaker function. will do tomorrow, no other acute issues prefers to got rehab, SW are.    03/03/2022: Patient was seen today AM, chart reviewed and discussed in team. He is meds compliant and seems to be doing very well on meds prescribed. Seen by Cardiology and has no active recommendation. HTN in control with Metoprolol, TTE- Advise to stop drinking , Pacemaker in function. To apply for rehab as pt, prefers, SW to start rehab processing.    03/04/2022: Patient was seen today AM, chart reviewed and discussed in team. He was in bed and woke up on mentioning his name. He endorses that he feels better and has been meds compliant all along and also prefers to have rehab rather in-patient as he fells that he needs it. He was also suggested by Cardiology to avoid ETOH, so going for rehab would be the best choice. No meds changes for now, cleared from Cardiology and mentally feels OK, to go to rehab.    03/07/2022: Patient was seen today AM, chart reviewed and discussed in team. Mood is OK, medically seems OK and has been meds compliant with no issues, need to go to rehab for stability/safety. SW aware and faxed paper work for acceptance to different programs, awaiting response. He has hx of ETOH abuse in the past . No meds changes    03/08/2022: Patient was seen today AM, chart reviewed and discussed in team. 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 Overall improved and to go for rehab at [facility]. He has no shakes from withdrawal and has not been scoring for CIWA for past few days, CIWA has been discontinued.    03/09/2022: Patient was seen today AM, chart reviewed and discussed in team. He is in hallway on the phone. Dressed clean with fair eye contact. He was informed of accepting at [facility] for rehab and probable date on 03/10/2022, he was advised that due to his heart condition he should refrain from ETOH abuse. Not depressed and has good sleep/appetite, limited participation in groups noted. To continue meds as ordered, no changes needed. Medically stable as well.    03/11/2022: Patient was seen today AM, chart reviewed ad discussed in team. No acute issues eager to start rehab as he has issues with ETOH and has been struggling with ETOH, last rehabs at [facility] and he started drinking after few weeks of discharge. Meds were continued as suggested.

## 2022-03-11 NOTE — DISCHARGE NOTE BEHAVIORAL HEALTH - NSBHDCHANDOFFFT_PSY_A_CORE
Handoff given to [facility]. DANIEL spoke with DANIEL @ [facility]

## 2022-03-11 NOTE — PROGRESS NOTE BEHAVIORAL HEALTH - NSBHCHARTREVIEWLAB_PSY_A_CORE FT
11.3   5.85  )-----------( 96       ( 24 Feb 2022 10:45 )             36.3    COVID-19 PCR: Not Detected (24 Feb 2022 10:40)  COVID-19 PCR: Not Detected (07 Dec 2021 17:56)  COVID-19 PCR: Not Detected (16 Nov 2021 15:56)
HgA1C= 6.5
11.3   5.85  )-----------( 96       ( 24 Feb 2022 10:45 )             36.3    COVID-19 PCR: Not Detected (24 Feb 2022 10:40)  COVID-19 PCR: Not Detected (07 Dec 2021 17:56)  COVID-19 PCR: Not Detected (16 Nov 2021 15:56)
HgA1C= 6.5

## 2022-03-11 NOTE — DISCHARGE NOTE BEHAVIORAL HEALTH - NS MD DC FALL RISK RISK
For information on Fall & Injury Prevention, visit: [email or web address] OR  [email or web address] OR  https://www.cdc.gov/steadi/patient.html

## 2022-03-11 NOTE — DISCHARGE NOTE BEHAVIORAL HEALTH - NSBHDCCRISISPLAN1FT_PSY_A_CORE
Tell a trusted friend/family member, tell your outpatient provider, call a crisis line ( Crisis Center ph. [phone], [facility] DASH [phone], [facility] (peer support) ph. [phone]), return to the nearest emergency room. You may call 9-1-1 for assistance.

## 2022-03-11 NOTE — DISCHARGE NOTE BEHAVIORAL HEALTH - NS TRANSFER PATIENT BELONGINGS
Clothing Brown wallet, 3 credit cards, $65, [place]  license, regular ring, 2 [employer] phones, 2 chargers, little black wallet/Cell Phone/PDA (specify)/Other belongings/Clothing

## 2022-03-11 NOTE — PROGRESS NOTE BEHAVIORAL HEALTH - ORIENTATION OTHER
pt more aware of dual diagnosis severity

## 2022-03-11 NOTE — PROGRESS NOTE BEHAVIORAL HEALTH - NSBHFUPINTERVALCCFT_PSY_A_CORE
" I know I need rehab. I need to go somewhere for maybe 2 or 3 months for treatment."    Pt with a left antecubital patch of red itchy skin.  HC 1% BID ordered 2/27/2022       HgA1C= 6.5  ( Hospitalist reconsult requested for pt evidence of DM and need for Insulin sliding scale. ) Pt reported no FH of DM
" I know I need rehab. I need to go somewhere for maybe 2 or 3 months for treatment."    Pt with a left antecubital patch of red itchy skin.  HC 1% BID ordered 2/27/2022       HgA1C= 6.5  ( Hospitalist reconsult requested for pt evidence of DM and need for Insulin sliding scale. ) Pt reported no FH of DM
" Depression with SI "
" I know I need rehab. I need to go somewhere for maybe 2 or 3 months for treatment."    CIWA  in place. Last score = 0
" I know I need rehab. I need to go somewhere for maybe 2 or 3 months for treatment."    Pt with a left antecubital patch of red itchy skin.  HC 1% BID ordered 2/27/2022       HgA1C= 6.5  ( Hospitalist reconsult requested for pt evidence of DM and need for Insulin sliding scale. ) Pt reported no FH of DM

## 2022-03-11 NOTE — PROGRESS NOTE BEHAVIORAL HEALTH - THOUGHT CONTENT
Ruminations/Guilt
Ruminations/Hopelessness/Guilt
Ruminations/Guilt
Hopelessness/Guilt/Suicidality
Ruminations/Guilt

## 2022-03-11 NOTE — PROGRESS NOTE BEHAVIORAL HEALTH - AFFECT QUALITY
Depressed/Anxious/Other
Depressed
Depressed/Anxious/Other
Depressed/Anxious/Other

## 2022-03-11 NOTE — PROGRESS NOTE BEHAVIORAL HEALTH - NS ED BHA MSE SPEECH SPONTANEITY
Normal
Normal/Other
Normal/Other
Normal
Normal/Other
Normal

## 2022-03-11 NOTE — PROGRESS NOTE BEHAVIORAL HEALTH - SECONDARY DX1
Alcohol use disorder, severe, in early remission, dependence

## 2022-03-11 NOTE — DISCHARGE NOTE BEHAVIORAL HEALTH - NSBHDCCRISISPLAN3FT_PSY_A_CORE
Discuss in treatment with counselor, attended a local/online self-help group, call a hotline ([facility] ([facility])[phone]).

## 2022-03-11 NOTE — PROGRESS NOTE BEHAVIORAL HEALTH - NSBHADMITMEDEDUDETAILS_A_CORE FT
Discussed risks; benefits; s-e

## 2022-03-11 NOTE — DISCHARGE NOTE BEHAVIORAL HEALTH - NSBHDCSUICFCTRMIT_PSY_A_CORE
To stop ETOH, f/u with Out-patient after care as suggested. To let Therapist know and associate freely.

## 2022-03-11 NOTE — PROGRESS NOTE BEHAVIORAL HEALTH - SUMMARY
42 year-old  male, domiciled with family, unemployed for past several years, from [facility] (English speaking), PMH HTN, dilated nonischemic cardiomyopathy, pacemaker, history of alcohol abuse (recent relapse 1.10.2022 - 2.22.2022), history of cocaine use, history detox a couple of times, history of substance abuse rehabilitation (last 12.2022), no suicidal ideation or suicide attempt, presenting with depression with suicidal ideation.    Patient presenting with Substance induced Mood Disorder. Patient has Grief reaction. Patient has comorbid Alcohol Use Disorder. Patient has current severe depressed mood, hopelessness, suicidal ideation and not engaged in safety planning, fearing attempting / committing suicide if discharged. Patient seeking voluntary admission for safety and stabilization.
42 year-old  male, domiciled with family, unemployed for past several years, from [facility] (English speaking), PMH HTN, dilated nonischemic cardiomyopathy, pacemaker, history of alcohol abuse (recent relapse 1.10.2022 - 2.22.2022), history of cocaine use, history detox a couple of times, history of substance abuse rehabilitation (last 12.2022), no suicidal ideation or suicide attempt, presenting with depression with suicidal ideation.    Patient presenting with Substance induced Mood Disorder. Patient has Grief reaction. Patient has comorbid Alcohol Use Disorder. Patient has current severe depressed mood, hopelessness, suicidal ideation and not engaged in safety planning, fearing attempting / committing suicide if discharged. Patient seeking voluntary admission for safety and stabilization.
42 year-old  male, domiciled with family, unemployed for past several years, from [facility] (English speaking), PMH HTN, dilated nonischemic cardiomyopathy, pacemaker, history of alcohol abuse (recent relapse 1.10.2022 - 2.22.2022), history of cocaine use, history detox a couple of times, history of substance abuse rehabilitation (last 12.2022), no suicidal ideation or suicide attempt, presenting with depression with suicidal ideation.    Patient presenting with Substance induced Mood Disorder. Patient has Grief reaction. Patient has comorbid Alcohol Use Disorder. Patient has current severe depressed mood, hopelessness, suicidal ideation and not engaged in safety planning, fearing attempting / committing suicide if discharged. Patient seeking voluntary admission for safety and stabilization.
42 year-old  male, domiciled with family, unemployed for past several years, from [facility] (English speaking), PMH HTN, dilated nonischemic cardiomyopathy, pacemaker, history of alcohol abuse (recent relapse 1.10.2022 - 2.22.2022), history of cocaine use, history detox a couple of times, history of substance abuse rehabilitation (last 12.2022), no suicidal ideation or suicide attempt, presenting with depression with suicidal ideation.    Patient presenting with Substance induced Mood Disorder. Patient has Grief reaction. Patient has comorbid Alcohol Use Disorder. Patient has current severe depressed mood, hopelessness, suicidal ideation and not engaged in safety planning, fearing attempting / committing suicide if discharged. Patient seeking voluntary admission for safety and stabilization.
42 year-old  male, domiciled with family, unemployed for past several years, from [facility] (English speaking), PMH HTN, dilated nonischemic cardiomyopathy, pacemaker, history of alcohol abuse (recent relapse 1.10.2022 - 2.22.2022), history of cocaine use, history detox a couple of times, history of substance abuse rehabilitation (last 12.2022), no suicidal ideation or suicide attempt, presenting with depression with suicidal ideation.    Patient presenting with Substance induced Mood Disorder. Patient has Grief reaction. Patient has comorbid Alcohol Use Disorder. Patient has current severe depressed mood, hopelessness, suicidal ideation and not engaged in safety planning, fearing attempting / committing suicide if discharged. Patient seeking voluntary admission for safety and stabilization.
42 year-old  male, domiciled with family, unemployed for past several years, from [facility] (English speaking), PMH HTN, dilated nonischemic cardiomyopathy, pacemaker, history of alcohol abuse (recent relapse 1.10.2022 - 2.22.2022), history of cocaine use, history detox a couple of times, history of substance abuse rehabilitation (last 12.2022), no suicidal ideation or suicide attempt, presenting with depression with suicidal ideation.    Patient presenting with Substance induced Mood Disorder. Patient has Grief reaction. Patient has comorbid Alcohol Use Disorder. Patient has current severe depressed mood, hopelessness, suicidal ideation and not engaged in safety planning, fearing attempting / committing suicide if discharged. Patient seeking voluntary admission for safety and stabilization.
42 year-old  male, domiciled with family, unemployed for past several years, from [facility] (English speaking), PMH HTN, dilated nonischemic cardiomyopathy, pacemaker, history of alcohol abuse (recent relapse 1.10.2022 - 2.22.2022), history of cocaine use, history detox a couple of times, history of substance abuse rehabilitation (last 12.2022), no suicidal ideation or suicide attempt, presenting with depression with suicidal ideation.    Patient presenting with Substance induced Mood Disorder. Patient has Grief reaction. Patient has comorbid Alcohol Use Disorder. Patient has current severe depressed mood, hopelessness, suicidal ideation and not engaged in safety planning, fearing attempting / committing suicide if discharged. Patient seeking voluntary admission for safety and stabilization.
42 year-old  male, domiciled with family, unemployed for past several years, from [facility] (English speaking), PMH HTN, dilated nonischemic cardiomyopathy, pacemaker, history of alcohol abuse (recent relapse 1.10.2022 - 2.22.2022), history of cocaine use, history detox a couple of times, history of substance abuse rehabilitation (last 12.2022), no suicidal ideation or suicide attempt, presenting with depression with suicidal ideation.    Patient presenting with Substance induced Mood Disorder. Patient has Grief reaction. Patient has comorbid Alcohol Use Disorder. Patient has current severe depressed mood, hopelessness, suicidal ideation and not engaged in safety planning, fearing attempting / committing suicide if discharged. Patient seeking voluntary admission for safety and stabilization.
42 year-old  male, domiciled with family, unemployed for past several years, from [facility] (English speaking), PMH HTN, dilated nonischemic cardiomyopathy, pacemaker, history of alcohol abuse (recent relapse 1.10.2022 - 2.22.2022), history of cocaine use, history detox a couple of times, history of substance abuse rehabilitation (last 12.2022), no suicidal ideation or suicide attempt, presenting with depression with suicidal ideation.    Patient presenting with Substance induced Mood Disorder. Patient has Grief reaction. Patient has comorbid Alcohol Use Disorder. Patient has current severe depressed mood, hopelessness, suicidal ideation and not engaged in safety planning, fearing attempting / committing suicide if discharged. Patient seeking voluntary admission for safety and stabilization.
42 year-old  male, domiciled with family, unemployed for past several years, from [facility] (English speaking), PMH HTN, dilated nonischemic cardiomyopathy, pacemaker, history of alcohol abuse (recent relapse 1.10.2022 - 2.22.2022), history of cocaine use, history detox a couple of times, history of substance abuse rehabilitation (last 12.2022), no suicidal ideation or suicide attempt, presenting with depression with suicidal ideation.    Patient presenting with Substance induced Mood Disorder. Patient has Grief reaction. Patient has comorbid Alcohol Use Disorder. Patient has current severe depressed mood, hopelessness, suicidal ideation and not engaged in safety planning, fearing attempting / committing suicide if discharged. Patient seeking voluntary admission for safety and stabilization.
42 year-old  male, domiciled with family, unemployed for past several years, from [facility] (English speaking), PMH HTN, dilated nonischemic cardiomyopathy, pacemaker, history of alcohol abuse (recent relapse 1.10.2022 - 2.22.2022), history of cocaine use, history detox a couple of times, history of substance abuse rehabilitation (last 12.2022), no suicidal ideation or suicide attempt, presenting with depression with suicidal ideation.    Patient presenting with Substance induced Mood Disorder. Patient has Grief reaction. Patient has comorbid Alcohol Use Disorder. Patient has current severe depressed mood, hopelessness, suicidal ideation and not engaged in safety planning, fearing attempting / committing suicide if discharged. Patient seeking voluntary admission for safety and stabilization.
42 year-old  male, domiciled with family, unemployed for past several years, from [facility] (English speaking), PMH HTN, dilated nonischemic cardiomyopathy, pacemaker, history of alcohol abuse (recent relapse 1.10.2022 - 2.22.2022), history of cocaine use, history detox a couple of times, history of substance abuse rehabilitation (last 12.2022), no suicidal ideation or suicide attempt, presenting with depression with suicidal ideation.    Patient presenting with Substance induced Mood Disorder. Patient has Grief reaction. Patient has comorbid Alcohol Use Disorder. Patient has current severe depressed mood, hopelessness, suicidal ideation and not engaged in safety planning, fearing attempting / committing suicide if discharged. Patient seeking voluntary admission for safety and stabilization.
42 year-old  male, domiciled with family, unemployed for past several years, from [facility] (English speaking), PMH HTN, dilated nonischemic cardiomyopathy, pacemaker, history of alcohol abuse (recent relapse 1.10.2022 - 2.22.2022), history of cocaine use, history detox a couple of times, history of substance abuse rehabilitation (last 12.2022), no suicidal ideation or suicide attempt, presenting with depression with suicidal ideation.    Patient presenting with Substance induced Mood Disorder. Patient has Grief reaction. Patient has comorbid Alcohol Use Disorder. Patient has current severe depressed mood, hopelessness, suicidal ideation and not engaged in safety planning, fearing attempting / committing suicide if discharged. Patient seeking voluntary admission for safety and stabilization.

## 2022-03-11 NOTE — DISCHARGE NOTE BEHAVIORAL HEALTH - CARE PROVIDER_API CALL
[facility],   See SW note below for continued vafter care f/u  Phone: (   )    -  Fax: (   )    -  Follow Up Time:

## 2022-03-12 NOTE — CHART NOTE - NSCHARTNOTEFT_GEN_A_CORE
Patient transferred to [facility] inpatient rehab program for further treatment.

## 2022-03-14 ENCOUNTER — FORM ENCOUNTER (OUTPATIENT)
Age: 42
End: 2022-03-14

## 2022-03-18 DIAGNOSIS — F12.99 CANNABIS USE, UNSPECIFIED WITH UNSPECIFIED CANNABIS-INDUCED DISORDER: ICD-10-CM

## 2022-03-18 DIAGNOSIS — F10.21 ALCOHOL DEPENDENCE, IN REMISSION: ICD-10-CM

## 2022-03-18 DIAGNOSIS — F10.251 ALCOHOL DEPENDENCE WITH ALCOHOL-INDUCED PSYCHOTIC DISORDER WITH HALLUCINATIONS: ICD-10-CM

## 2022-03-18 DIAGNOSIS — Z56.0 UNEMPLOYMENT, UNSPECIFIED: ICD-10-CM

## 2022-03-18 DIAGNOSIS — F19.94 OTHER PSYCHOACTIVE SUBSTANCE USE, UNSPECIFIED WITH PSYCHOACTIVE SUBSTANCE-INDUCED MOOD DISORDER: ICD-10-CM

## 2022-03-18 DIAGNOSIS — Z91.14 PATIENT'S OTHER NONCOMPLIANCE WITH MEDICATION REGIMEN: ICD-10-CM

## 2022-03-18 DIAGNOSIS — Z79.82 LONG TERM (CURRENT) USE OF ASPIRIN: ICD-10-CM

## 2022-03-18 DIAGNOSIS — R20.0 ANESTHESIA OF SKIN: ICD-10-CM

## 2022-03-18 DIAGNOSIS — R45.851 SUICIDAL IDEATIONS: ICD-10-CM

## 2022-03-18 DIAGNOSIS — F32.A DEPRESSION, UNSPECIFIED: ICD-10-CM

## 2022-03-18 DIAGNOSIS — R73.03 PREDIABETES: ICD-10-CM

## 2022-03-18 DIAGNOSIS — L29.9 PRURITUS, UNSPECIFIED: ICD-10-CM

## 2022-03-18 DIAGNOSIS — I10 ESSENTIAL (PRIMARY) HYPERTENSION: ICD-10-CM

## 2022-03-18 DIAGNOSIS — Z95.810 PRESENCE OF AUTOMATIC (IMPLANTABLE) CARDIAC DEFIBRILLATOR: ICD-10-CM

## 2022-03-18 DIAGNOSIS — F14.99 COCAINE USE, UNSPECIFIED WITH UNSPECIFIED COCAINE-INDUCED DISORDER: ICD-10-CM

## 2022-03-18 DIAGNOSIS — I42.0 DILATED CARDIOMYOPATHY: ICD-10-CM

## 2022-03-18 DIAGNOSIS — Z20.822 CONTACT WITH AND (SUSPECTED) EXPOSURE TO COVID-19: ICD-10-CM

## 2022-03-18 SDOH — ECONOMIC STABILITY - INCOME SECURITY: UNEMPLOYMENT, UNSPECIFIED: Z56.0

## 2022-03-23 NOTE — ED PROVIDER NOTE - PMH
Discharge Summary/Instructions after an Endoscopic Procedure  Patient Name: Cesario Sahu  Patient MRN: [id number]  Patient YOB: 1957 Wednesday, March 23, 2022  Abby Green MD  Dear patient,  As a result of recent federal legislation (The Federal Cures Act), you may   receive lab or pathology results from your procedure in your [facility]   account before your physician is able to contact you. Your physician or   their representative will relay the results to you with their   recommendations at their soonest availability.  Thank you,  RESTRICTIONS:  During your procedure today, you received medications for sedation.  These   medications may affect your judgment, balance and coordination.  Therefore,   for 24 hours, you have the following restrictions:   - DO NOT drive a car, operate machinery, make legal/financial decisions,   sign important papers or drink alcohol.    ACTIVITY:  Today: no heavy lifting, straining or running due to procedural   sedation/anesthesia.  The following day: return to full activity including work.  DIET:  Eat and drink normally unless instructed otherwise.     TREATMENT FOR COMMON SIDE EFFECTS:  - Mild abdominal pain, nausea, belching, bloating or excessive gas:  rest,   eat lightly and use a heating pad.  - Sore Throat: treat with throat lozenges and/or gargle with warm salt   water.  - Because air was used during the procedure, expelling large amounts of air   from your rectum or belching is normal.  - If a bowel prep was taken, you may not have a bowel movement for 1-3 days.    This is normal.  SYMPTOMS TO WATCH FOR AND REPORT TO YOUR PHYSICIAN:  1. Abdominal pain or bloating, other than gas cramps.  2. Chest pain.  3. Back pain.  4. Signs of infection such as: chills or fever occurring within 24 hours   after the procedure.  5. Rectal bleeding, which would show as bright red, maroon, or black stools.   (A tablespoon of blood from the rectum is not serious, especially  if   hemorrhoids are present.)  6. Vomiting.  7. Weakness or dizziness.  GO DIRECTLY TO THE NEAREST EMERGENCY ROOM IF YOU HAVE ANY OF THE FOLLOWING:      Difficulty breathing              Chills and/or fever over 101 F   Persistent vomiting and/or vomiting blood   Severe abdominal pain   Severe chest pain   Black, tarry stools   Bleeding- more than one tablespoon   Any other symptom or condition that you feel may need urgent attention  Your doctor recommends these additional instructions:  If any biopsies were taken, your doctors clinic will contact you in 1 to 2   weeks with any results.  - Patient has a contact number available for emergencies.  The signs and   symptoms of potential delayed complications were discussed with the   patient.  Return to normal activities tomorrow.  Written discharge   instructions were provided to the patient.   - Discharge patient to home (via wheelchair).   - Resume previous diet today.   - Continue present medications.   - Await pathology results.   - Return to GI clinic PRN.  For questions, problems or results please call your physician Abby Green MD at Work:  [phone]  If you have any questions about the above instructions, call the GI   department at [phone] or call the endoscopy unit at [phone]   from 7am until 3 pm.  [facility], EMERGENCY ROOM PHONE NUMBER:   [phone]  IF A COMPLICATION OR EMERGENCY SITUATION ARISES AND YOU ARE UNABLE TO REACH   YOUR PHYSICIAN - GO DIRECTLY TO THE EMERGENCY ROOM.  I have read or have had read to me these discharge instructions for my   procedure and have received a written copy.  I understand these   instructions and will follow-up with my physician if I have any questions.     __________________________________       _____________________________________  Nurse Signature                                          Patient/Designated   Responsible Party Signature  MD Abby Adams  MD Norma  3/23/2022 9:58:42 AM  This report has been verified and signed electronically.  Dear patient,  As a result of recent federal legislation (The Federal Cures Act), you may   receive lab or pathology results from your procedure in your [facility]   account before your physician is able to contact you. Your physician or   their representative will relay the results to you with their   recommendations at their soonest availability.  Thank you,  [employer]   Pacemaker

## 2022-04-17 ENCOUNTER — APPOINTMENT (OUTPATIENT)
Dept: ELECTROPHYSIOLOGY | Facility: CLINIC | Age: 42
End: 2022-04-17
Payer: COMMERCIAL

## 2022-04-18 ENCOUNTER — NON-APPOINTMENT (OUTPATIENT)
Age: 42
End: 2022-04-18

## 2022-04-18 PROCEDURE — 93295 DEV INTERROG REMOTE 1/2/MLT: CPT

## 2022-04-18 PROCEDURE — 93296 REM INTERROG EVL PM/IDS: CPT

## 2022-07-17 ENCOUNTER — APPOINTMENT (OUTPATIENT)
Dept: ELECTROPHYSIOLOGY | Facility: CLINIC | Age: 42
End: 2022-07-17

## 2022-07-18 ENCOUNTER — NON-APPOINTMENT (OUTPATIENT)
Age: 42
End: 2022-07-18

## 2022-07-18 PROCEDURE — 93295 DEV INTERROG REMOTE 1/2/MLT: CPT

## 2022-07-18 PROCEDURE — 93296 REM INTERROG EVL PM/IDS: CPT

## 2022-09-07 NOTE — PROGRESS NOTE BEHAVIORAL HEALTH - BODY HABITUS
Malnourished
Adult

## 2022-10-16 ENCOUNTER — APPOINTMENT (OUTPATIENT)
Dept: ELECTROPHYSIOLOGY | Facility: CLINIC | Age: 42
End: 2022-10-16

## 2022-11-09 NOTE — PATIENT PROFILE BEHAVIORAL HEALTH - HISTORY OF COVID-19 VACCINATION
Increase sertraline to 75 mg (1.5 tab) for 4 days, then up to 100 mg (2 tab) daily.  I sent a new prescription to the [facility]'s for 100 mg tab.  Let's have another virtual/telephone visit in about a month.     Yes

## 2022-11-19 NOTE — PROGRESS NOTE BEHAVIORAL HEALTH - NSBHFUPSUICINTERVAL_PSY_A_CORE
Provider spoke to pt mailed GI ref  
none known

## 2022-12-11 ENCOUNTER — FORM ENCOUNTER (OUTPATIENT)
Age: 42
End: 2022-12-11

## 2022-12-14 ENCOUNTER — APPOINTMENT (OUTPATIENT)
Dept: ELECTROPHYSIOLOGY | Facility: CLINIC | Age: 42
End: 2022-12-14

## 2022-12-14 ENCOUNTER — INPATIENT (INPATIENT)
Facility: HOSPITAL | Age: 42
LOS: 1 days | Discharge: LEFT BEFORE DISCHARGE | DRG: 770 | End: 2022-12-16
Attending: HOSPITALIST | Admitting: HOSPITALIST
Payer: COMMERCIAL

## 2022-12-14 VITALS — WEIGHT: 169.98 LBS | HEIGHT: 65 IN

## 2022-12-14 DIAGNOSIS — F10.239 ALCOHOL DEPENDENCE WITH WITHDRAWAL, UNSPECIFIED: ICD-10-CM

## 2022-12-14 DIAGNOSIS — Z95.0 PRESENCE OF CARDIAC PACEMAKER: ICD-10-CM

## 2022-12-14 DIAGNOSIS — K74.60 UNSPECIFIED CIRRHOSIS OF LIVER: ICD-10-CM

## 2022-12-14 DIAGNOSIS — Z82.49 FAMILY HISTORY OF ISCHEMIC HEART DISEASE AND OTHER DISEASES OF THE CIRCULATORY SYSTEM: ICD-10-CM

## 2022-12-14 DIAGNOSIS — R74.02 ELEVATION OF LEVELS OF LACTIC ACID DEHYDROGENASE [LDH]: ICD-10-CM

## 2022-12-14 DIAGNOSIS — I42.9 CARDIOMYOPATHY, UNSPECIFIED: ICD-10-CM

## 2022-12-14 DIAGNOSIS — E86.0 DEHYDRATION: ICD-10-CM

## 2022-12-14 DIAGNOSIS — K92.0 HEMATEMESIS: ICD-10-CM

## 2022-12-14 DIAGNOSIS — Z91.81 HISTORY OF FALLING: ICD-10-CM
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
## 2022-12-14 DIAGNOSIS — I10 ESSENTIAL (PRIMARY) HYPERTENSION: ICD-10-CM

## 2022-12-14 LAB
ALBUMIN SERPL ELPH-MCNC: 3.2 G/DL — LOW (ref 3.3–5)
ALBUMIN SERPL ELPH-MCNC: 3.5 G/DL — SIGNIFICANT CHANGE UP (ref 3.3–5)
ALP SERPL-CCNC: 89 U/L — SIGNIFICANT CHANGE UP (ref 40–120)
ALP SERPL-CCNC: 94 U/L — SIGNIFICANT CHANGE UP (ref 40–120)
ALT FLD-CCNC: 49 U/L — SIGNIFICANT CHANGE UP (ref 12–78)
ALT FLD-CCNC: 54 U/L — SIGNIFICANT CHANGE UP (ref 12–78)
ANION GAP SERPL CALC-SCNC: 10 MMOL/L — SIGNIFICANT CHANGE UP (ref 5–17)
ANION GAP SERPL CALC-SCNC: 8 MMOL/L — SIGNIFICANT CHANGE UP (ref 5–17)
APPEARANCE UR: CLEAR — SIGNIFICANT CHANGE UP
APTT BLD: 37.7 SEC — HIGH (ref 27.5–35.5)
AST SERPL-CCNC: 46 U/L — HIGH (ref 15–37)
AST SERPL-CCNC: 55 U/L — HIGH (ref 15–37)
BASOPHILS # BLD AUTO: 0.04 K/UL — SIGNIFICANT CHANGE UP (ref 0–0.2)
BASOPHILS NFR BLD AUTO: 0.5 % — SIGNIFICANT CHANGE UP (ref 0–2)
BILIRUB DIRECT SERPL-MCNC: 0.3 MG/DL — SIGNIFICANT CHANGE UP (ref 0–0.3)
BILIRUB INDIRECT FLD-MCNC: 0.6 MG/DL — SIGNIFICANT CHANGE UP (ref 0.2–1)
BILIRUB SERPL-MCNC: 0.7 MG/DL — SIGNIFICANT CHANGE UP (ref 0.2–1.2)
BILIRUB SERPL-MCNC: 0.9 MG/DL — SIGNIFICANT CHANGE UP (ref 0.2–1.2)
BILIRUB UR-MCNC: NEGATIVE — SIGNIFICANT CHANGE UP
BUN SERPL-MCNC: 10 MG/DL — SIGNIFICANT CHANGE UP (ref 7–23)
BUN SERPL-MCNC: 8 MG/DL — SIGNIFICANT CHANGE UP (ref 7–23)
CALCIUM SERPL-MCNC: 7.8 MG/DL — LOW (ref 8.5–10.1)
CALCIUM SERPL-MCNC: 8.4 MG/DL — LOW (ref 8.5–10.1)
CHLORIDE SERPL-SCNC: 103 MMOL/L — SIGNIFICANT CHANGE UP (ref 96–108)
CHLORIDE SERPL-SCNC: 107 MMOL/L — SIGNIFICANT CHANGE UP (ref 96–108)
CO2 SERPL-SCNC: 24 MMOL/L — SIGNIFICANT CHANGE UP (ref 22–31)
CO2 SERPL-SCNC: 26 MMOL/L — SIGNIFICANT CHANGE UP (ref 22–31)
COLOR SPEC: YELLOW — SIGNIFICANT CHANGE UP
CREAT SERPL-MCNC: 0.66 MG/DL — SIGNIFICANT CHANGE UP (ref 0.5–1.3)
CREAT SERPL-MCNC: 0.76 MG/DL — SIGNIFICANT CHANGE UP (ref 0.5–1.3)
DIFF PNL FLD: ABNORMAL
EGFR: 114 ML/MIN/1.73M2 — SIGNIFICANT CHANGE UP
EGFR: 119 ML/MIN/1.73M2 — SIGNIFICANT CHANGE UP
EOSINOPHIL # BLD AUTO: 0.15 K/UL — SIGNIFICANT CHANGE UP (ref 0–0.5)
EOSINOPHIL NFR BLD AUTO: 1.9 % — SIGNIFICANT CHANGE UP (ref 0–6)
ETHANOL SERPL-MCNC: 250 MG/DL — HIGH (ref 0–10)
FLUAV AG NPH QL: SIGNIFICANT CHANGE UP
FLUBV AG NPH QL: SIGNIFICANT CHANGE UP
GLUCOSE SERPL-MCNC: 130 MG/DL — HIGH (ref 70–99)
GLUCOSE SERPL-MCNC: 88 MG/DL — SIGNIFICANT CHANGE UP (ref 70–99)
GLUCOSE UR QL: NEGATIVE — SIGNIFICANT CHANGE UP
HCT VFR BLD CALC: 48.1 % — SIGNIFICANT CHANGE UP (ref 39–50)
HGB BLD-MCNC: 15.9 G/DL — SIGNIFICANT CHANGE UP (ref 13–17)
IMM GRANULOCYTES NFR BLD AUTO: 0.1 % — SIGNIFICANT CHANGE UP (ref 0–0.9)
INR BLD: 1.1 RATIO — SIGNIFICANT CHANGE UP (ref 0.88–1.16)
KETONES UR-MCNC: ABNORMAL
LACTATE SERPL-SCNC: 3.2 MMOL/L — HIGH (ref 0.7–2)
LEUKOCYTE ESTERASE UR-ACNC: ABNORMAL
LIDOCAIN IGE QN: 108 U/L — SIGNIFICANT CHANGE UP (ref 73–393)
LYMPHOCYTES # BLD AUTO: 2.52 K/UL — SIGNIFICANT CHANGE UP (ref 1–3.3)
LYMPHOCYTES # BLD AUTO: 32.6 % — SIGNIFICANT CHANGE UP (ref 13–44)
MAGNESIUM SERPL-MCNC: 1.5 MG/DL — LOW (ref 1.6–2.6)
MAGNESIUM SERPL-MCNC: 1.7 MG/DL — SIGNIFICANT CHANGE UP (ref 1.6–2.6)
MCHC RBC-ENTMCNC: 27.7 PG — SIGNIFICANT CHANGE UP (ref 27–34)
MCHC RBC-ENTMCNC: 33.1 GM/DL — SIGNIFICANT CHANGE UP (ref 32–36)
MCV RBC AUTO: 83.9 FL — SIGNIFICANT CHANGE UP (ref 80–100)
MONOCYTES # BLD AUTO: 0.42 K/UL — SIGNIFICANT CHANGE UP (ref 0–0.9)
MONOCYTES NFR BLD AUTO: 5.4 % — SIGNIFICANT CHANGE UP (ref 2–14)
NEUTROPHILS # BLD AUTO: 4.6 K/UL — SIGNIFICANT CHANGE UP (ref 1.8–7.4)
NEUTROPHILS NFR BLD AUTO: 59.5 % — SIGNIFICANT CHANGE UP (ref 43–77)
NITRITE UR-MCNC: NEGATIVE — SIGNIFICANT CHANGE UP
PCP SPEC-MCNC: SIGNIFICANT CHANGE UP
PH UR: 6 — SIGNIFICANT CHANGE UP (ref 5–8)
PHOSPHATE SERPL-MCNC: 2.3 MG/DL — LOW (ref 2.5–4.5)
PLATELET # BLD AUTO: 154 K/UL — SIGNIFICANT CHANGE UP (ref 150–400)
POTASSIUM SERPL-MCNC: 3.3 MMOL/L — LOW (ref 3.5–5.3)
POTASSIUM SERPL-MCNC: 3.8 MMOL/L — SIGNIFICANT CHANGE UP (ref 3.5–5.3)
POTASSIUM SERPL-SCNC: 3.3 MMOL/L — LOW (ref 3.5–5.3)
POTASSIUM SERPL-SCNC: 3.8 MMOL/L — SIGNIFICANT CHANGE UP (ref 3.5–5.3)
PROT SERPL-MCNC: 7.1 GM/DL — SIGNIFICANT CHANGE UP (ref 6–8.3)
PROT SERPL-MCNC: 7.8 GM/DL — SIGNIFICANT CHANGE UP (ref 6–8.3)
PROT UR-MCNC: NEGATIVE — SIGNIFICANT CHANGE UP
PROTHROM AB SERPL-ACNC: 12.8 SEC — SIGNIFICANT CHANGE UP (ref 10.5–13.4)
RBC # BLD: 5.73 M/UL — SIGNIFICANT CHANGE UP (ref 4.2–5.8)
RBC # FLD: 13.8 % — SIGNIFICANT CHANGE UP (ref 10.3–14.5)
RSV RNA NPH QL NAA+NON-PROBE: SIGNIFICANT CHANGE UP
SARS-COV-2 RNA SPEC QL NAA+PROBE: SIGNIFICANT CHANGE UP
SODIUM SERPL-SCNC: 137 MMOL/L — SIGNIFICANT CHANGE UP (ref 135–145)
SODIUM SERPL-SCNC: 141 MMOL/L — SIGNIFICANT CHANGE UP (ref 135–145)
SP GR SPEC: 1.01 — SIGNIFICANT CHANGE UP (ref 1.01–1.02)
TROPONIN I, HIGH SENSITIVITY RESULT: 11.1 NG/L — SIGNIFICANT CHANGE UP
UROBILINOGEN FLD QL: NEGATIVE — SIGNIFICANT CHANGE UP
WBC # BLD: 7.74 K/UL — SIGNIFICANT CHANGE UP (ref 3.8–10.5)
WBC # FLD AUTO: 7.74 K/UL — SIGNIFICANT CHANGE UP (ref 3.8–10.5)

## 2022-12-14 PROCEDURE — 84100 ASSAY OF PHOSPHORUS: CPT

## 2022-12-14 PROCEDURE — 80076 HEPATIC FUNCTION PANEL: CPT

## 2022-12-14 PROCEDURE — 85027 COMPLETE CBC AUTOMATED: CPT

## 2022-12-14 PROCEDURE — 80048 BASIC METABOLIC PNL TOTAL CA: CPT

## 2022-12-14 PROCEDURE — 83605 ASSAY OF LACTIC ACID: CPT

## 2022-12-14 PROCEDURE — 80053 COMPREHEN METABOLIC PANEL: CPT

## 2022-12-14 PROCEDURE — 99223 1ST HOSP IP/OBS HIGH 75: CPT

## 2022-12-14 PROCEDURE — 71045 X-RAY EXAM CHEST 1 VIEW: CPT | Mod: 26

## 2022-12-14 PROCEDURE — 80307 DRUG TEST PRSMV CHEM ANLYZR: CPT

## 2022-12-14 PROCEDURE — 36415 COLL VENOUS BLD VENIPUNCTURE: CPT

## 2022-12-14 PROCEDURE — 81001 URINALYSIS AUTO W/SCOPE: CPT

## 2022-12-14 PROCEDURE — 74176 CT ABD & PELVIS W/O CONTRAST: CPT | Mod: 26,MA

## 2022-12-14 PROCEDURE — 70450 CT HEAD/BRAIN W/O DYE: CPT | Mod: 26,MA

## 2022-12-14 PROCEDURE — 93010 ELECTROCARDIOGRAM REPORT: CPT

## 2022-12-14 PROCEDURE — 72125 CT NECK SPINE W/O DYE: CPT | Mod: 26,MA

## 2022-12-14 PROCEDURE — 99285 EMERGENCY DEPT VISIT HI MDM: CPT

## 2022-12-14 PROCEDURE — 83735 ASSAY OF MAGNESIUM: CPT

## 2022-12-14 PROCEDURE — C9113: CPT

## 2022-12-14 PROCEDURE — 84145 PROCALCITONIN (PCT): CPT

## 2022-12-14 RX ORDER — SODIUM CHLORIDE 9 MG/ML
1000 INJECTION INTRAMUSCULAR; INTRAVENOUS; SUBCUTANEOUS ONCE
Refills: 0 | Status: COMPLETED | OUTPATIENT
Start: 2022-12-14 | End: 2022-12-14

## 2022-12-14 RX ORDER — THIAMINE MONONITRATE (VIT B1) 100 MG
100 TABLET ORAL DAILY
Refills: 0 | Status: DISCONTINUED | OUTPATIENT
Start: 2022-12-14 | End: 2022-12-16

## 2022-12-14 RX ORDER — INFLUENZA VIRUS VACCINE 15; 15; 15; 15 UG/.5ML; UG/.5ML; UG/.5ML; UG/.5ML
0.5 SUSPENSION INTRAMUSCULAR ONCE
Refills: 0 | Status: DISCONTINUED | OUTPATIENT
Start: 2022-12-14 | End: 2022-12-16

## 2022-12-14 RX ORDER — FOLIC ACID 0.8 MG
1 TABLET ORAL DAILY
Refills: 0 | Status: DISCONTINUED | OUTPATIENT
Start: 2022-12-14 | End: 2022-12-16

## 2022-12-14 RX ORDER — PANTOPRAZOLE SODIUM 20 MG/1
40 TABLET, DELAYED RELEASE ORAL ONCE
Refills: 0 | Status: COMPLETED | OUTPATIENT
Start: 2022-12-14 | End: 2022-12-14

## 2022-12-14 RX ORDER — SUCRALFATE 1 G
1 TABLET ORAL
Refills: 0 | Status: DISCONTINUED | OUTPATIENT
Start: 2022-12-14 | End: 2022-12-16

## 2022-12-14 RX ORDER — POTASSIUM CHLORIDE 20 MEQ
40 PACKET (EA) ORAL ONCE
Refills: 0 | Status: COMPLETED | OUTPATIENT
Start: 2022-12-14 | End: 2022-12-15

## 2022-12-14 RX ORDER — ONDANSETRON 8 MG/1
4 TABLET, FILM COATED ORAL ONCE
Refills: 0 | Status: COMPLETED | OUTPATIENT
Start: 2022-12-14 | End: 2022-12-14

## 2022-12-14 RX ORDER — PANTOPRAZOLE SODIUM 20 MG/1
40 TABLET, DELAYED RELEASE ORAL EVERY 12 HOURS
Refills: 0 | Status: DISCONTINUED | OUTPATIENT
Start: 2022-12-14 | End: 2022-12-16

## 2022-12-14 RX ADMIN — PANTOPRAZOLE SODIUM 40 MILLIGRAM(S): 20 TABLET, DELAYED RELEASE ORAL at 18:19

## 2022-12-14 RX ADMIN — Medication 1 GRAM(S): at 18:19

## 2022-12-14 RX ADMIN — Medication 2 MILLIGRAM(S): at 22:16

## 2022-12-14 RX ADMIN — SODIUM CHLORIDE 2000 MILLILITER(S): 9 INJECTION INTRAMUSCULAR; INTRAVENOUS; SUBCUTANEOUS at 10:04

## 2022-12-14 RX ADMIN — SODIUM CHLORIDE 2000 MILLILITER(S): 9 INJECTION INTRAMUSCULAR; INTRAVENOUS; SUBCUTANEOUS at 11:53

## 2022-12-14 RX ADMIN — PANTOPRAZOLE SODIUM 40 MILLIGRAM(S): 20 TABLET, DELAYED RELEASE ORAL at 10:03

## 2022-12-14 RX ADMIN — Medication 2 MILLIGRAM(S): at 18:19

## 2022-12-14 RX ADMIN — ONDANSETRON 4 MILLIGRAM(S): 8 TABLET, FILM COATED ORAL at 10:03

## 2022-12-14 RX ADMIN — Medication 2 MILLIGRAM(S): at 14:48

## 2022-12-14 NOTE — H&P ADULT - VTE RISK ASSESSMENT
[facility] Call Center    Phone Message    May a detailed message be left on voicemail: yes    Reason for Call: Medication Question or concern regarding medication   Prescription Clarification  Name of Medication: fluconazole  Prescribing Provider: Keith   Pharmacy:  [facility]/PHARMACY #[id number] [address]     What on the order needs clarification? Pt's mother, Mary called and stated pt is having yeast infection and requested for fluconazole. Please call back pt's mother if possible to get the med. Thanks.          Action Taken: Message routed to:  [facility] [place]ver     VTE Assessment already completed for this visit

## 2022-12-14 NOTE — PATIENT PROFILE ADULT - TOBACCO USE
Pt carried out by tracy with no complications. Respirations regular. NAD noted. Pt parents received DC Instructions with follow up information and stated no further questions.
Trauma bravo downgraded upon arrival to the room by dr. Clare Foreman.
Never smoker

## 2022-12-14 NOTE — H&P ADULT - ASSESSMENT
#ETOH abuse  #ETOH cirrhosis  #Elevated lactate  #Emesis  - admit to [facility]  - TNI negative and has no active chest pain  - EKG: paced, no stt changes  - Start high scale ciwa protocol  - seizure precautions  - coags. Hg normal  - PPI + carafate  - monitor for bleeding  - can been seen by GI as o/p  - CXr negative for infiltrates  - UA pending  - rec'd 2L and will f/u reflex lactate  - monitor off abx. Elevation likely from dehydration and not real infection  - send procalcitonin        DV Tpx: SCDs for now

## 2022-12-14 NOTE — PATIENT PROFILE ADULT - FALL HARM RISK - HARM RISK INTERVENTIONS

## 2022-12-14 NOTE — SBIRT NOTE ADULT - NSSBIRTSIXOCC_GEN_A_CORE
pt with syncope unclear etiology being worked up by primary team, possible exacerbated by meds, ecg no conduction disease. ILR monitoring may be useful to assess for arrhythmia symptoms correlation in the future. as above Daily

## 2022-12-14 NOTE — ED PROVIDER NOTE - PHYSICAL EXAMINATION
Constitutional: NAD AAOx3  Eyes: PERRL, EOMI  Head: Normocephalic atraumatic  Mouth: MMM  Cardiac: regular rate   Resp: Lungs CTAB  GI: Abd s/nd. Mild mid epigastric tenderness.   Neuro: CN2-12 intact  Extremities: Intact distal pulses b/l, no calf tenderness, normal ROM b/l UE and LE   Skin: No rashes

## 2022-12-14 NOTE — H&P ADULT - NSHPLABSRESULTS_GEN_ALL_CORE
CBC:            15.9   7.74  )-----------( 154      ( 12-14-22 @ 09:24 )             48.1         Chem:         ( 12-14-22 @ 09:24 )    137  |  103  |  10  ----------------------------<  130<H>  3.3<L>   |  26  |  0.76        Liver Functions: ( 12-14-22 @ 09:24 )  Alb: 3.5 g/dL / Pro: 7.8 gm/dL / ALK PHOS: 94 U/L / ALT: 54 U/L / AST: 55 U/L / GGT: x              Type & Screen:     < from: CT Abdomen and Pelvis No Cont (12.14.22 @ 10:05) >      IMPRESSION:  Hepatic steatosis. Mild nodular contour of the liver, suggestive of   cirrhosis.    Apparent distal esophageal mural thickening; if clinically indicated, EGD   may be pursued for further evaluation.    Distended gallbladder. No CT evidence for gallstones, thickened   gallbladder wallor pericholecystic fluid. If clinically indicated,   gallbladder ultrasound may be pursued for further evaluation.    Indeterminate 1.2 cm exophytic lesion in the right kidney. Nonemergent   renal ultrasound or abdominal MR without and with IV contrast may be   pursued for further further evaluation.    --- End of Report ---    < end of copied text >

## 2022-12-14 NOTE — ED ADULT TRIAGE NOTE - CHIEF COMPLAINT QUOTE
Pt presents to ED c/o vomiting blood and ETOH withdrawals. Drinks aprox 1L/day. last drink 11pm. also c/o tremors, diaphoresis and anxiety

## 2022-12-14 NOTE — SBIRT NOTE ADULT - NSSBIRTBRIEFINTDET_GEN_A_CORE
Services provided via Telephonic SBIRT line, [phone],  [id number] used. Screening results were reviewed with the patient and patient was provided information about healthy guidelines and potential negative consequences associated with level of risk. Motivation and readiness to reduce or stop use was discussed and goals and activities to make changes were suggested/offered. Patient is requesting referral to detox, HC will speak with provider re: admission to medical floor vs [facility] admission.

## 2022-12-14 NOTE — ED PROVIDER NOTE - OBJECTIVE STATEMENT
41 y/o male with a PMHx of cardiomyopathy, HTN, pacemaker presents to the ED c/o hematemesis. Pt was sober and started drinking again 10 days ago. Pt drinks a pint of vodka daily. Pt had CP yesterday, now resolved. Denies leg pain/swelling. No recent drug use. No other complaints at this time. 41 y/o male with a PMHx of cardiomyopathy, HTN, pacemaker presents to the ED c/o hematemesis. Pt was sober and started drinking again 10 days ago. Pt drinks a pint of vodka daily. Pt had CP yesterday, now resolved. Denies leg pain/swelling. No recent drug use. Pt reports he also had multiple falls due to drinking, he denied any specific injuries. No other complaints at this time.

## 2022-12-14 NOTE — ED PROVIDER NOTE - NS ED ROS FT
Constitutional: No fever or chills  Eyes: No visual changes  HEENT: No throat pain  CV: No chest pain  Resp: No SOB no cough  GI: No abd pain, +hematemesis   : No dysuria  MSK: No musculoskeletal pain  Skin: No rash  Neuro: No headache

## 2022-12-14 NOTE — H&P ADULT - HISTORY OF PRESENT ILLNESS
41 y/o male with a PMHx of cardiomyopathy, HTN, pacemaker/Bi-V AICD, presents to the ED c/o hematemesis. Pt was sober and started drinking again 10 days ago. Pt drinks a pint of vodka daily. Pt had CP yesterday, now resolved. Denies leg pain/swelling. No recent drug use. Pt reports he also had multiple falls due to drinking, he denied any specific injuries. No other complaints at this time.     ED course: TNI neg x . CTH/C spine and AP performed. Thickening of esophageal lining. Cirrhotic liver. No e/o portal htn gastropathy. H/H wnl. Hemodynamically stable  Lactate 3.2. Awaiting UA. CXR w/o infiltrates. Afebrile. No other complaints.   Given 2L NS bolus awaiting refle lactate.   Denies HA/ neck pain, cough. Nausea/vomiting resolved. No rashes        PAST MEDICAL/SURGICAL/FAMILY/SOCIAL HISTORY:    Past Medical, Past Surgical, and Family History:  PAST MEDICAL HISTORY:  HTN (hypertension)     Pacemaker.     FAMILY HISTORY:  FH: HTN (hypertension).    · Social Concerns: None

## 2022-12-14 NOTE — H&P ADULT - NSHPPHYSICALEXAM_GEN_ALL_CORE
ICU Vital Signs Last 24 Hrs  T(C): 37.1 (14 Dec 2022 09:09), Max: 37.1 (14 Dec 2022 09:09)  T(F): 98.7 (14 Dec 2022 09:09), Max: 98.7 (14 Dec 2022 09:09)  HR: 95 (14 Dec 2022 09:09) (95 - 95)  BP: 150/97 (14 Dec 2022 09:09) (150/97 - 150/97)  BP(mean): 114 (14 Dec 2022 09:09) (114 - 114)  ABP: --  ABP(mean): --  RR: 16 (14 Dec 2022 09:09) (16 - 16)  SpO2: 100% (14 Dec 2022 09:09) (100% - 100%)    O2 Parameters below as of 14 Dec 2022 09:09  Patient On (Oxygen Delivery Method): room air            PHYSICAL EXAM:    Constitutional: NAD, awake and alert  HEENT: PERR, EOMI, Normal Hearing, MMM  Neck: Soft and supple, No LAD, No JVD  Respiratory: Breath sounds are clear bilaterally, No wheezing, rales or rhonchi  Cardiovascular: S1 and S2, regular rate and rhythm, no Murmurs, gallops or rubs  Gastrointestinal: Bowel Sounds present, soft, nontender, nondistended, no guarding, no rebound  Extremities: No peripheral edema  Vascular: 2+ peripheral pulses  Neurological: A/O x 3, no focal deficits  Musculoskeletal: 5/5 strength b/l upper and lower extremities  Skin: No rashes

## 2022-12-14 NOTE — ED PROVIDER NOTE - CLINICAL SUMMARY MEDICAL DECISION MAKING FREE TEXT BOX
Labs and imaging will be obtained. I ordered IVF, zofran, protonix, Labs and imaging will be obtained. I ordered IVF, zofran, protonix, Labs and imaging will be obtained, hb is stable, imaging did not reveal any cirrhosis, d/w sbirt, d/w medicine and will be admitted for further evaluation.

## 2022-12-14 NOTE — ED ADULT NURSE NOTE - OBJECTIVE STATEMENT
Pt presented to the ER with c/o abdominal pain and vomiting blood. Pt stated that he has been drinking for the past ten days without eating. Pt stated that he has been drinking a pint of vodka a day. Per pt he was sober from drinking from april to sept. and just started to drink 10 days ago. Pt reported that his last drink was around 0500 this morning. Pt reported vomiting small amount of blood this morning. Pt also requested that he receives help for rehab. Pt denies having withdrawal symptoms in the past. Pt denies any CP, dizziness, or SOB at this time.

## 2022-12-14 NOTE — SBIRT NOTE ADULT - NSSBIRTDRGBRIEFINTDET_GEN_A_CORE
Services provided via Telephonic SBIRT line, [phone],  [id number] used. Screening results were reviewed with the patient and patient was provided information about healthy guidelines and potential negative consequences associated with level of risk. Motivation and readiness to reduce or stop use was discussed and goals and activities to make changes were suggested/offered. Patient is requesting referral to detox, HC will speak with provider re: admission to medical floor vs [facility] admission.

## 2022-12-15 ENCOUNTER — NON-APPOINTMENT (OUTPATIENT)
Age: 42
End: 2022-12-15

## 2022-12-15 LAB
ALBUMIN SERPL ELPH-MCNC: 3.5 G/DL — SIGNIFICANT CHANGE UP (ref 3.3–5)
ALP SERPL-CCNC: 92 U/L — SIGNIFICANT CHANGE UP (ref 40–120)
ALT FLD-CCNC: 49 U/L — SIGNIFICANT CHANGE UP (ref 12–78)
ANION GAP SERPL CALC-SCNC: 7 MMOL/L — SIGNIFICANT CHANGE UP (ref 5–17)
AST SERPL-CCNC: 46 U/L — HIGH (ref 15–37)
BILIRUB SERPL-MCNC: 1.6 MG/DL — HIGH (ref 0.2–1.2)
BUN SERPL-MCNC: 8 MG/DL — SIGNIFICANT CHANGE UP (ref 7–23)
CALCIUM SERPL-MCNC: 8.4 MG/DL — LOW (ref 8.5–10.1)
CHLORIDE SERPL-SCNC: 98 MMOL/L — SIGNIFICANT CHANGE UP (ref 96–108)
CO2 SERPL-SCNC: 28 MMOL/L — SIGNIFICANT CHANGE UP (ref 22–31)
CREAT SERPL-MCNC: 0.75 MG/DL — SIGNIFICANT CHANGE UP (ref 0.5–1.3)
EGFR: 114 ML/MIN/1.73M2 — SIGNIFICANT CHANGE UP
GLUCOSE SERPL-MCNC: 143 MG/DL — HIGH (ref 70–99)
HCT VFR BLD CALC: 45.2 % — SIGNIFICANT CHANGE UP (ref 39–50)
HGB BLD-MCNC: 15.3 G/DL — SIGNIFICANT CHANGE UP (ref 13–17)
LACTATE SERPL-SCNC: 1.3 MMOL/L — SIGNIFICANT CHANGE UP (ref 0.7–2)
MAGNESIUM SERPL-MCNC: 1.4 MG/DL — LOW (ref 1.6–2.6)
MCHC RBC-ENTMCNC: 27.9 PG — SIGNIFICANT CHANGE UP (ref 27–34)
MCHC RBC-ENTMCNC: 33.8 GM/DL — SIGNIFICANT CHANGE UP (ref 32–36)
MCV RBC AUTO: 82.3 FL — SIGNIFICANT CHANGE UP (ref 80–100)
PHOSPHATE SERPL-MCNC: 1.4 MG/DL — LOW (ref 2.5–4.5)
PLATELET # BLD AUTO: 102 K/UL — LOW (ref 150–400)
POTASSIUM SERPL-MCNC: 3.3 MMOL/L — LOW (ref 3.5–5.3)
POTASSIUM SERPL-SCNC: 3.3 MMOL/L — LOW (ref 3.5–5.3)
PROCALCITONIN SERPL-MCNC: 0.18 NG/ML — HIGH (ref 0.02–0.1)
PROT SERPL-MCNC: 7.5 GM/DL — SIGNIFICANT CHANGE UP (ref 6–8.3)
RBC # BLD: 5.49 M/UL — SIGNIFICANT CHANGE UP (ref 4.2–5.8)
RBC # FLD: 13.4 % — SIGNIFICANT CHANGE UP (ref 10.3–14.5)
SODIUM SERPL-SCNC: 133 MMOL/L — LOW (ref 135–145)
WBC # BLD: 6.95 K/UL — SIGNIFICANT CHANGE UP (ref 3.8–10.5)
WBC # FLD AUTO: 6.95 K/UL — SIGNIFICANT CHANGE UP (ref 3.8–10.5)

## 2022-12-15 PROCEDURE — 93296 REM INTERROG EVL PM/IDS: CPT

## 2022-12-15 PROCEDURE — 93295 DEV INTERROG REMOTE 1/2/MLT: CPT

## 2022-12-15 PROCEDURE — 99233 SBSQ HOSP IP/OBS HIGH 50: CPT

## 2022-12-15 RX ORDER — HEPARIN SODIUM 5000 [USP'U]/ML
5000 INJECTION INTRAVENOUS; SUBCUTANEOUS EVERY 8 HOURS
Refills: 0 | Status: DISCONTINUED | OUTPATIENT
Start: 2022-12-15 | End: 2022-12-16

## 2022-12-15 RX ORDER — SODIUM,POTASSIUM PHOSPHATES 278-250MG
1 POWDER IN PACKET (EA) ORAL ONCE
Refills: 0 | Status: COMPLETED | OUTPATIENT
Start: 2022-12-15 | End: 2022-12-15

## 2022-12-15 RX ORDER — MAGNESIUM SULFATE 500 MG/ML
1 VIAL (ML) INJECTION ONCE
Refills: 0 | Status: COMPLETED | OUTPATIENT
Start: 2022-12-15 | End: 2022-12-15

## 2022-12-15 RX ORDER — SODIUM CHLORIDE 9 MG/ML
1000 INJECTION INTRAMUSCULAR; INTRAVENOUS; SUBCUTANEOUS
Refills: 0 | Status: DISCONTINUED | OUTPATIENT
Start: 2022-12-15 | End: 2022-12-16

## 2022-12-15 RX ADMIN — PANTOPRAZOLE SODIUM 40 MILLIGRAM(S): 20 TABLET, DELAYED RELEASE ORAL at 06:08

## 2022-12-15 RX ADMIN — SODIUM CHLORIDE 75 MILLILITER(S): 9 INJECTION INTRAMUSCULAR; INTRAVENOUS; SUBCUTANEOUS at 14:29

## 2022-12-15 RX ADMIN — Medication 1.5 MILLIGRAM(S): at 22:13

## 2022-12-15 RX ADMIN — Medication 40 MILLIEQUIVALENT(S): at 10:10

## 2022-12-15 RX ADMIN — Medication 100 MILLIGRAM(S): at 10:13

## 2022-12-15 RX ADMIN — HEPARIN SODIUM 5000 UNIT(S): 5000 INJECTION INTRAVENOUS; SUBCUTANEOUS at 22:13

## 2022-12-15 RX ADMIN — Medication 2 MILLIGRAM(S): at 06:08

## 2022-12-15 RX ADMIN — Medication 100 GRAM(S): at 11:06

## 2022-12-15 RX ADMIN — Medication 1 TABLET(S): at 11:07

## 2022-12-15 RX ADMIN — Medication 1 MILLIGRAM(S): at 10:12

## 2022-12-15 RX ADMIN — Medication 2 MILLIGRAM(S): at 02:05

## 2022-12-15 RX ADMIN — Medication 1.5 MILLIGRAM(S): at 14:28

## 2022-12-15 RX ADMIN — Medication 1 PACKET(S): at 11:06

## 2022-12-15 RX ADMIN — Medication 2 MILLIGRAM(S): at 10:12

## 2022-12-15 RX ADMIN — HEPARIN SODIUM 5000 UNIT(S): 5000 INJECTION INTRAVENOUS; SUBCUTANEOUS at 14:26

## 2022-12-15 RX ADMIN — Medication 1 GRAM(S): at 11:06

## 2022-12-15 RX ADMIN — Medication 1 GRAM(S): at 06:08

## 2022-12-15 RX ADMIN — Medication 1.5 MILLIGRAM(S): at 17:54

## 2022-12-15 RX ADMIN — Medication 1 GRAM(S): at 02:03

## 2022-12-15 NOTE — PROGRESS NOTE ADULT - SUBJECTIVE AND OBJECTIVE BOX
Patient is a 42y old  Male who presents with a chief complaint of etoh (14 Dec 2022 12:37)      SUBJECTIVE:   HPI:    43 y/o male with a PMHx of cardiomyopathy, HTN, pacemaker/Bi-V AICD, presents to the ED c/o hematemesis. Pt was sober and started drinking again 10 days ago. Pt drinks a pint of vodka daily. Pt had CP yesterday, now resolved. Denies leg pain/swelling. No recent drug use. Pt reports he also had multiple falls due to drinking, he denied any specific injuries. No other complaints at this time.     ED course: TNI neg x . CTH/C spine and AP performed. Thickening of esophageal lining. Cirrhotic liver. No e/o portal htn gastropathy. H/H wnl. Hemodynamically stable  Lactate 3.2. Awaiting UA. CXR w/o infiltrates. Afebrile. No other complaints.   Given 2L NS bolus awaiting refle lactate.   Denies HA/ neck pain, cough. Nausea/vomiting resolved. No rashes      sub: resting. No active tremors. Ciwa 0 at present        PAST MEDICAL/SURGICAL/FAMILY/SOCIAL HISTORY:    Past Medical, Past Surgical, and Family History:  PAST MEDICAL HISTORY:  HTN (hypertension)     Pacemaker.     FAMILY HISTORY:  FH: HTN (hypertension).    · Social Concerns: None   (14 Dec 2022 12:37)        REVIEW OF SYSTEMS:    CONSTITUTIONAL: No weakness, fevers or chills  EYES/ENT: No visual changes;  No vertigo or throat pain   NECK: No pain or stiffness  RESPIRATORY: No cough, wheezing, hemoptysis; No shortness of breath  CARDIOVASCULAR: No chest pain or palpitations  GASTROINTESTINAL: No abdominal or epigastric pain. No nausea, vomiting, or hematemesis; No diarrhea or constipation. No melena or hematochezia.  GENITOURINARY: No dysuria, frequency or hematuria  NEUROLOGICAL: No numbness or weakness  SKIN: No itching, burning, rashes, or lesions   All other review of systems is negative unless indicated above      Weight (kg): 92.6 (12-14 @ 18:58)    Vital Signs Last 24 Hrs  T(C): 36.8 (15 Dec 2022 09:00), Max: 37.3 (14 Dec 2022 18:11)  T(F): 98.2 (15 Dec 2022 09:00), Max: 99.2 (15 Dec 2022 02:01)  HR: 120 (15 Dec 2022 09:00) (85 - 120)  BP: 149/111 (15 Dec 2022 09:00) (137/86 - 163/98)  BP(mean): 95 (15 Dec 2022 06:04) (95 - 116)  RR: 18 (15 Dec 2022 09:00) (16 - 18)  SpO2: 97% (15 Dec 2022 09:00) (94% - 97%)    Parameters below as of 15 Dec 2022 09:00  Patient On (Oxygen Delivery Method): room air        I&O's Summary      CAPILLARY BLOOD GLUCOSE          PHYSICAL EXAM:    Constitutional: NAD, resting but arousable  HEENT: PERR, EOMI, Normal Hearing, MMM  Neck: Soft and supple, No LAD, No JVD  Respiratory: Breath sounds are clear bilaterally, No wheezing, rales or rhonchi  Cardiovascular: S1 and S2, regular rate and rhythm, no Murmurs, gallops or rubs  Gastrointestinal: Bowel Sounds present, soft, nontender, nondistended, no guarding, no rebound  Extremities: No peripheral edema  Vascular: 2+ peripheral pulses  Neurological: A/O x 3, no focal deficits  Musculoskeletal: 5/5 strength b/l upper and lower extremities  Skin: No rashes    MEDICATIONS:  MEDICATIONS  (STANDING):  folic acid 1 milliGRAM(s) Oral daily  influenza   Vaccine 0.5 milliLiter(s) IntraMuscular once  LORazepam   Injectable 1.5 milliGRAM(s) IV Push every 4 hours  LORazepam   Injectable   IV Push   multivitamin 1 Tablet(s) Oral daily  pantoprazole  Injectable 40 milliGRAM(s) IV Push every 12 hours  sucralfate suspension 1 Gram(s) Oral four times a day  thiamine 100 milliGRAM(s) Oral daily      LABS: All Labs Reviewed:                        15.3   6.95  )-----------( 102      ( 15 Dec 2022 08:05 )             45.2     12-15    133<L>  |  98  |  8   ----------------------------<  143<H>  3.3<L>   |  28  |  0.75    Ca    8.4<L>      15 Dec 2022 08:05  Phos  1.4     12-15  Mg     1.4     12-15    TPro  7.5  /  Alb  3.5  /  TBili  1.6<H>  /  DBili  x   /  AST  46<H>  /  ALT  49  /  AlkPhos  92  12-15    PT/INR - ( 14 Dec 2022 09:24 )   PT: 12.8 sec;   INR: 1.10 ratio         PTT - ( 14 Dec 2022 09:24 )  PTT:37.7 sec          Blood Culture:     RADIOLOGY/EKG: reviewed

## 2022-12-15 NOTE — PROGRESS NOTE ADULT - ASSESSMENT
#ETOH abuse  #ETOH cirrhosis  #Elevated lactate  #Emesis  - TNI negative and has no active chest pain  - EKG: paced, no stt changes  - Start high scale ciwa protocol  - seizure precautions  - coags. Hg normal  - PPI + carafate  - monitor for bleeding. No further emesis since admission. H/H stable  - can been seen by GI as o/p  - CXr negative for infiltrates  - UA negative  - rec'd 2L and will f/u reflex lactate. 3.2 on repeat. Send lactate now and start ivf. Afebrile. Hemodynamics stable  - monitor off abx. Elevation likely from dehydration and not real infection  - send procalcitonin        DV Tpx: SCDs for now

## 2022-12-16 VITALS
OXYGEN SATURATION: 97 % | SYSTOLIC BLOOD PRESSURE: 138 MMHG | HEART RATE: 106 BPM | TEMPERATURE: 98 F | RESPIRATION RATE: 20 BRPM | DIASTOLIC BLOOD PRESSURE: 99 MMHG

## 2022-12-16 LAB
ALBUMIN SERPL ELPH-MCNC: 3.5 G/DL — SIGNIFICANT CHANGE UP (ref 3.3–5)
ALP SERPL-CCNC: 99 U/L — SIGNIFICANT CHANGE UP (ref 40–120)
ALT FLD-CCNC: 65 U/L — SIGNIFICANT CHANGE UP (ref 12–78)
ANION GAP SERPL CALC-SCNC: 8 MMOL/L — SIGNIFICANT CHANGE UP (ref 5–17)
AST SERPL-CCNC: 74 U/L — HIGH (ref 15–37)
BILIRUB SERPL-MCNC: 0.9 MG/DL — SIGNIFICANT CHANGE UP (ref 0.2–1.2)
BUN SERPL-MCNC: 6 MG/DL — LOW (ref 7–23)
CALCIUM SERPL-MCNC: 8.8 MG/DL — SIGNIFICANT CHANGE UP (ref 8.5–10.1)
CHLORIDE SERPL-SCNC: 106 MMOL/L — SIGNIFICANT CHANGE UP (ref 96–108)
CO2 SERPL-SCNC: 25 MMOL/L — SIGNIFICANT CHANGE UP (ref 22–31)
CREAT SERPL-MCNC: 0.83 MG/DL — SIGNIFICANT CHANGE UP (ref 0.5–1.3)
EGFR: 111 ML/MIN/1.73M2 — SIGNIFICANT CHANGE UP
GLUCOSE SERPL-MCNC: 140 MG/DL — HIGH (ref 70–99)
POTASSIUM SERPL-MCNC: 3.5 MMOL/L — SIGNIFICANT CHANGE UP (ref 3.5–5.3)
POTASSIUM SERPL-SCNC: 3.5 MMOL/L — SIGNIFICANT CHANGE UP (ref 3.5–5.3)
PROT SERPL-MCNC: 7.9 GM/DL — SIGNIFICANT CHANGE UP (ref 6–8.3)
SODIUM SERPL-SCNC: 139 MMOL/L — SIGNIFICANT CHANGE UP (ref 135–145)

## 2022-12-16 RX ADMIN — HEPARIN SODIUM 5000 UNIT(S): 5000 INJECTION INTRAVENOUS; SUBCUTANEOUS at 06:16

## 2022-12-16 RX ADMIN — Medication 1.5 MILLIGRAM(S): at 02:07

## 2022-12-16 RX ADMIN — PANTOPRAZOLE SODIUM 40 MILLIGRAM(S): 20 TABLET, DELAYED RELEASE ORAL at 06:20

## 2022-12-16 RX ADMIN — Medication 1.5 MILLIGRAM(S): at 06:17

## 2022-12-16 RX ADMIN — Medication 1 GRAM(S): at 06:16

## 2022-12-16 RX ADMIN — Medication 1 GRAM(S): at 00:26

## 2023-01-10 NOTE — CHART NOTE - NSCHARTNOTEFT_GEN_A_CORE
PT left AMA prior to discussing risks/benefits with MD> Per nursing, patient was alert and oriented x 4.
No hypokalemia on repeat

## 2023-03-20 ENCOUNTER — FORM ENCOUNTER (OUTPATIENT)
Age: 43
End: 2023-03-20

## 2023-03-21 ENCOUNTER — APPOINTMENT (OUTPATIENT)
Dept: ELECTROPHYSIOLOGY | Facility: CLINIC | Age: 43
End: 2023-03-21

## 2023-04-02 ENCOUNTER — FORM ENCOUNTER (OUTPATIENT)
Age: 43
End: 2023-04-02

## 2023-04-02 ENCOUNTER — APPOINTMENT (OUTPATIENT)
Dept: ELECTROPHYSIOLOGY | Facility: CLINIC | Age: 43
End: 2023-04-02
Payer: COMMERCIAL

## 2023-04-03 ENCOUNTER — NON-APPOINTMENT (OUTPATIENT)
Age: 43
End: 2023-04-03

## 2023-04-03 PROCEDURE — 93295 DEV INTERROG REMOTE 1/2/MLT: CPT

## 2023-04-03 PROCEDURE — 93296 REM INTERROG EVL PM/IDS: CPT

## 2023-05-19 NOTE — PROGRESS NOTE BEHAVIORAL HEALTH - NSBHROSSTATEMENT_PSY_A_CORE
75 y/o F with PMHx of ESRD on MWF, DM presents to the ED for missed HD sessions. Pt states she has missed 5 HD sessions in last 2 weeks. Pt states she missed 2 because the bus went to the wrong address and then she missed 3 because she was on vacation.  Pt states she went to dialysis center today and they told her she could not get HD today due to so many missed sessions. Pt endorses [facility] but states this is her baseline for the last couple of years. Pt denies any other complaints at this time. Denies fevers, chills, HA, vision changes, CP, SOB, abd pain, n/v/d/c, dysuria, hematuria. 
.

## 2023-07-05 ENCOUNTER — APPOINTMENT (OUTPATIENT)
Dept: ELECTROPHYSIOLOGY | Facility: CLINIC | Age: 43
End: 2023-07-05
Payer: SELF-PAY

## 2023-07-06 ENCOUNTER — NON-APPOINTMENT (OUTPATIENT)
Age: 43
End: 2023-07-06

## 2023-07-06 PROCEDURE — 93295 DEV INTERROG REMOTE 1/2/MLT: CPT

## 2023-07-06 PROCEDURE — 93296 REM INTERROG EVL PM/IDS: CPT

## 2023-07-18 NOTE — ED ADULT NURSE NOTE - NS ED NURSE LEVEL OF CONSCIOUSNESS SPEECH
Speaking Coherently Metronidazole Counseling:  I discussed with the patient the risks of metronidazole including but not limited to seizures, nausea/vomiting, a metallic taste in the mouth, nausea/vomiting and severe allergy.

## 2023-10-04 ENCOUNTER — APPOINTMENT (OUTPATIENT)
Dept: ELECTROPHYSIOLOGY | Facility: CLINIC | Age: 43
End: 2023-10-04
Payer: SELF-PAY

## 2023-10-05 ENCOUNTER — NON-APPOINTMENT (OUTPATIENT)
Age: 43
End: 2023-10-05

## 2023-10-05 PROCEDURE — 93296 REM INTERROG EVL PM/IDS: CPT

## 2023-10-05 PROCEDURE — 93295 DEV INTERROG REMOTE 1/2/MLT: CPT

## 2023-11-08 NOTE — SBIRT NOTE ADULT - NSSBIRTDRGUSEDOTHTHAN_GEN_A_CORE
[address] , [address] [facility] [address]      Phone: [phone]   azithromycin 250 MG tablet       2. [unfilled]  Return to ED if worse     Diagnosis     Clinical Impression:   1. Bilateral acute serous otitis media, recurrence not specified              Please note that this dictation was completed with [employer], the computer voice recognition software. Quite often unanticipated grammatical, syntax, homophones, and other interpretive errors are inadvertently transcribed by the computer software. Please disregard these errors. Please excuse any errors that have escaped final proofreading.         Kaiden Mendoza MD  11/08/23 3502 Yes

## 2024-01-04 ENCOUNTER — APPOINTMENT (OUTPATIENT)
Dept: ELECTROPHYSIOLOGY | Facility: CLINIC | Age: 44
End: 2024-01-04

## 2024-01-31 NOTE — ED PROVIDER NOTE - CLINICAL SUMMARY MEDICAL DECISION MAKING FREE TEXT BOX
Dc PGY3: likely etoh withdrawal, CIWA 5-6 at initial encounter. Plan for fluids, po librium, SBIRT consult, monitoring.
ambulatory
3 = A little assistance

## 2024-01-31 NOTE — ED ADULT TRIAGE NOTE - ESI TRIAGE ACUITY LEVEL, MLM
Care Due:                  Date            Visit Type   Department     Provider  --------------------------------------------------------------------------------                                NP -                              PRIMARY      [facility] PR[facility]  Last Visit: 11-      CARE ([facility])   JANEL Nunez  Next Visit: None Scheduled  None         None Found                                                            Last  Test          Frequency    Reason                     Performed    Due Date  --------------------------------------------------------------------------------    HBA1C.......  6 months...  insulin..................  Not Found    Overdue    [employer] Embedded Care Due Messages. Reference number: [id number].   1/31/2024 5:44:50 PM CST   3

## 2024-02-07 ENCOUNTER — APPOINTMENT (OUTPATIENT)
Dept: ELECTROPHYSIOLOGY | Facility: CLINIC | Age: 44
End: 2024-02-07

## 2024-03-13 ENCOUNTER — APPOINTMENT (OUTPATIENT)
Dept: ELECTROPHYSIOLOGY | Facility: CLINIC | Age: 44
End: 2024-03-13

## 2024-03-26 NOTE — BH SAFETY PLAN - INTERNAL COPING STRATEGY 2
[employer].
Medication teaching and assessment/Observation and assessment/Rehabilitation services/Teaching and training

## 2024-04-18 ENCOUNTER — APPOINTMENT (OUTPATIENT)
Dept: ELECTROPHYSIOLOGY | Facility: CLINIC | Age: 44
End: 2024-04-18

## 2024-06-13 NOTE — SBIRT NOTE ADULT - NSSBIRTDRINKAM_GEN_A_CORE
Chief Complaint   Patient presents with    Cough     Cough x 1 week chest tightness     SUBJECTIVE:  Karlee Emery is a 63 year old female presenting with cough, sinus pain, pressure, headache, sob, mucus, wheezes, lingering for 10 days. Rib cage pain with cough on left side.  No hemoptysis.    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 Re-referred to urology.     JAMES (obstructive sleep apnea) 09/03/2019    Other abnormal glucose     Perennial allergic rhinitis 06/29/2020     Current Outpatient Medications   Medication Sig Dispense Refill    albuterol (PROAIR HFA/PROVENTIL HFA/VENTOLIN HFA) 108 (90 Base) MCG/ACT inhaler Inhale 2 puffs into the lungs every 6 hours as needed for shortness of breath, wheezing or cough 18 g 0    amoxicillin-clavulanate (AUGMENTIN) 875-125 MG tablet Take 1 tablet by mouth 2 times daily for 5 days 10 tablet 0    azithromycin (ZITHROMAX) 250 MG tablet Take 2 tablets (500 mg) by mouth daily for 1 day, THEN 1 tablet (250 mg) daily for 4 days. 6 tablet 0    atorvastatin (LIPITOR) 40 MG tablet Take 1 tablet (40 mg) by  mouth daily 90 tablet 3    blood glucose (ACCU-CHEK GUIDE) test strip 1 strip by In Vitro route daily (Patient not taking: Reported on 2023) 100 each 0    blood glucose (NO BRAND SPECIFIED) lancets standard Use to test blood sugar 1 time daily or as directed. (Patient not taking: Reported on 2023) 50 each 3    blood glucose monitoring (NO BRAND SPECIFIED) meter device kit Use to test blood sugar 1 time daily or as directed. Blood Glucose Monitor Brands: per insurance formulary. (Patient not taking: Reported on 2023) 1 kit 0    buPROPion (WELLBUTRIN XL) 150 MG 24 hr tablet Take 1 tablet (150 mg) by mouth every morning 90 tablet 3    loratadine (CLARITIN) 10 MG tablet Take 1 tablet by mouth daily. 90 tablet 0    metFORMIN (GLUCOPHAGE XR) 500 MG 24 hr tablet Take one tablet (500 mg) by mouth with food in the morning and two tablets (1,000 mg) with dinner in the evening. 270 tablet 1    Semaglutide (RYBELSUS) 3 MG tablet Take 3 mg by mouth daily 30 tablet 5    valACYclovir (VALTREX) 500 MG tablet Take 1 Tablet (500 mg) by mouth once daily. 90 tablet 3    venlafaxine (EFFEXOR XR) 150 MG 24 hr capsule Take 1 Capsule by mouth once daily with a meal. 90 capsule 3     No current facility-administered medications for this visit.     Social History     Tobacco Use    Smoking status: Former     Current packs/day: 0.00     Types: Cigarettes     Quit date: 1980     Years since quittin.4    Smokeless tobacco: Never   Substance Use Topics    Alcohol use: Not Currently     Alcohol/week: 2.0 standard drinks of alcohol     Allergies   Allergen Reactions    Sulfa Antibiotics Nausea and Vomiting and Hives       Review of Systems  All systems negative except for those listed above in HPI.    OBJECTIVE:   /84 (BP Location: Right arm, Patient Position: Sitting, Cuff Size: Adult Large)   Pulse 92   Temp 98.9  F (37.2  C) (Oral)   Wt 79.6 kg (175 lb 8 oz)   LMP 03/15/2013 (LMP Unknown)   SpO2 96%   BMI 33.16  kg/m      Physical Exam  Vitals reviewed.   Constitutional:       General: She is not in acute distress.     Appearance: She is well-developed. She is not ill-appearing, toxic-appearing or diaphoretic.   HENT:      Head: Normocephalic and atraumatic.      Right Ear: Tympanic membrane and ear canal normal.      Left Ear: Tympanic membrane and ear canal normal.      Nose: Congestion and rhinorrhea present.      Mouth/Throat:      Pharynx: No oropharyngeal exudate or posterior oropharyngeal erythema.   Eyes:      Conjunctiva/sclera: Conjunctivae normal.      Pupils: Pupils are equal, round, and reactive to light.   Cardiovascular:      Rate and Rhythm: Normal rate.      Pulses: Normal pulses.   Pulmonary:      Effort: Respiratory distress present.      Breath sounds: No stridor. Wheezing present. No rhonchi or rales.   Chest:      Chest wall: Tenderness present.   Musculoskeletal:         General: Normal range of motion.      Cervical back: Normal range of motion and neck supple.   Lymphadenopathy:      Cervical: Cervical adenopathy present.   Skin:     General: Skin is warm.      Capillary Refill: Capillary refill takes less than 2 seconds.      Findings: No rash.   Neurological:      General: No focal deficit present.      Mental Status: She is alert and oriented to person, place, and time.   Psychiatric:         Mood and Affect: Mood normal.         Behavior: Behavior normal.       X-ray done in clinic read by me as positive for left lower lung lobe faint opacities concerning for early pneumonia.  Results for orders placed or performed in visit on 06/12/24   XR Chest 2 Views     Status: None    Narrative    EXAM: XR CHEST 2 VIEWS  LOCATION: [facility]  DATE: 6/12/2024    INDICATION: cough, sob, mucus, wheezes, rhonchi, lingering for 10 days  COMPARISON: None.      Impression    IMPRESSION: Negative chest. Lungs clear.     ASSESSMENT:    ICD-10-CM    1. Acute cough  R05.1 XR Chest 2 Views       2. Pneumonia of left lower lobe due to infectious organism  J18.9 amoxicillin-clavulanate (AUGMENTIN) 875-125 MG tablet     azithromycin (ZITHROMAX) 250 MG tablet      3. Acute non-recurrent pansinusitis  J01.40 amoxicillin-clavulanate (AUGMENTIN) 875-125 MG tablet     azithromycin (ZITHROMAX) 250 MG tablet      4. Wheezing  R06.2 albuterol (PROAIR HFA/PROVENTIL HFA/VENTOLIN HFA) 108 (90 Base) MCG/ACT inhaler        PLAN:     Augmentin and zpak for LLL pneumonia and sinusitis  Radiologist read as normal, however, I still would treat as such given comparison to prior x-ray as well as clinical symptoms  Albuterol for wheeze  Rest! Your body needs more rest to heal.  Drink plenty of fluids (warm fluids like tea or soup are soothing and reduce cough)  Sit in the bathroom with a hot shower running and breathe in the steam.  Honey may soothe your sore throat and help manage your cough- may take straight or in warm water with lemon juice.  Avoid smoke (cigarettes, bonfires, fireplace, wood burning stoves).  Take Tylenol or an NSAID such as ibuprofen or naproxen as needed for pain.  Delsym (dextromethorphan polistirex) is an over the counter cough medication that lasts 12 hours.   Mucinex or Robitussin (guiafenesin) thin mucus and may help it to loosen more quickly  Good handwashing is the best way to prevent spread of germs  Present to emergency room if you develop trouble breathing, swallowing or cough-up blood.  Follow up with your primary care provider if symptoms worsen or fail to improve as expected.    Follow up with primary care provider with any problems, questions or concerns or if symptoms worsen or fail to improve. Patient agreed to plan and verbalized understanding.    MILAN [facility]   Daily or almost daily

## 2024-08-15 NOTE — DISCHARGE NOTE BEHAVIORAL HEALTH - NSBHDCPURPOSE1FT_PSY_A_CORE
Addended by: YUNIOR GALICIA on: 8/15/2024 11:29 AM     Modules accepted: Orders     Inpatient Substance Abuse Treatment.

## 2024-09-05 NOTE — ED ADULT NURSE NOTE - NSFALLRSKASSISTTYPE_ED_ALL_ED
Walking What Type Of Note Output Would You Prefer (Optional)?: Bullet Format Is This A New Presentation, Or A Follow-Up?: Skin Lesion Which Family Member (Optional)?: Mother, father

## 2024-10-22 NOTE — PROCEDURE NOTE - NSICDXPROCEDURE_GEN_ALL_CORE_FT
Quality 130: Documentation Of Current Medications In The Medical Record: Current Medications Documented
Detail Level: Detailed
Quality 226: Preventive Care And Screening: Tobacco Use: Screening And Cessation Intervention: Patient screened for tobacco use and is an ex/non-smoker
PROCEDURES:  Replacement, biventricular ICD pulse generator 18-Nov-2021 16:12:51  Kostas Mcguire

## 2025-02-13 NOTE — PROGRESS NOTE BEHAVIORAL HEALTH - GROOMING
Orders:    clonazePAM (KlonoPIN) 1 mg tablet; Take 1 tablet (1 mg) by mouth once daily at bedtime.    
Good
Fair
Good
Fair

## 2025-04-14 NOTE — ED BEHAVIORAL HEALTH ASSESSMENT NOTE - CURRENTLY ENROLLED STUDENT
Cardiovascular/Thoracic Surgery Transfer of Care:  4/14/2025    Cardiologist:  MD Esthela    Requesting Physician:  MD Esthela    Reason for Transfer of Care:  surgical evaluation of CAD    Chief Complaint:   back pain    HPI:  Getachew Cunha is a 63 year old male with PMH significant for known CAD s/p PCI to ostial + proximal RCA, HLD, T2DM (A1C 10.8, on insulin), hypothyroidism, tobacco use, lumbar disc herniation pending surgery who initially presented to cardiologist's office with abnormal stress test.  Subsequent cardiac catheterization shows severe multivessel CAD. CV surgery has been consulted for surgical evaluation for coronary artery bypass grafting.    Today, Getachew is seen in [facility] and agrees to the history as above. He reports he has never had any cardiac symptoms and lives a pretty active lift, which has been recently limited due to his back and leg pain.  He denies current chest pain, palpitations, arm/jaw/abdominal pain, shortness of breath, fatigue, weakness, dizziness, presyncope/syncope, numbness/tingling, lower extremity swelling, fever, nausea, vomiting, cough, sore throat. Denies previous chest/leg surgeries or radiation, denies history of DVT, varicose veins or vein stripping procedures.     Past Medical History    Hyperlipidemia                                                Heart attack  (CMD)                             09/17/2010    Myoclonus                                                     CAD (coronary artery disease)                                 Hypothyroidism                                                  Comment: Acquired, status post radioactive ablation    Sebaceous hyperplasia                                         Hemorrhoids                                                   Grave's disease                                                 Comment: Status post radioactive ablation at age 21    Depression                                                    Leukocytosis                                                   Impaired fasting glucose                                      Ulnar neuropathy                                                Comment: left upper extremity     Left cervical radiculopathy                                     Comment: follows with neurologist     Tobacco abuse                                                 History of colonoscopy                          01/13/2010      Comment: Chart reviewed. Last colonoscopy on 1/13/2010                by Dr. Reaves. Recommend repeat in 10 years.     Colon polyp                                     02/27/2024      Comment: dr mendoza  tubular adenoma  hyperplastic  5                year recall    Past Surgical History    ANGIO AORTOBIFEMORAL W CATH                                   INSERT INTRACORONARY STENT,ADDNL                                Comment: Stent, 2 Plus    KNEE SCOPE,DIAGNOSTIC                           11/21/2007      Comment: Knee Arthroscopy right     KNEE SCOPE,DIAGNOSTIC                           12/18/2001      Comment: Knee Arthroscopy left    OCCULT BLOOD TEST TUBE                          02/18/2011    ROTATOR CUFF REPAIR                                           PLASTY KNEE,MED OR LAT COMPARTMT                05/16/2012      Comment: R Knee Unicompartment replacement-[facility]    NM MYOCARDIAL PERFUSION REST OR STRESS MULTI    04/30/2012      Comment: LVEF 52 %,Normal myocardial perfusion scan     PLASTY KNEE,MED OR LAT COMPARTMT                11/07/2012      Comment: Left knee Unicompartment replacement-[facility]    SHOULDER ARTHROSCOPY                            01/01/2009      Comment: rtc     CATARACT EXTRACTION W/  INTRAOCULAR LENS IMPLA* 08/03/2023    COLONOSCOPY                                     02/27/2024      Comment: dr mendoza tubular adenoma hyperplastic 5 year                recall    CORONARY ANGIOGRAM/POSSIBLE PTCA - CV           04/14/2025    ALLERGIES:   Allergen Reactions    Tegretol  ANAPHYLAXIS    Methimazole HIVES              Current Facility-Administered Medications   Medication    cloNIDine (CATAPRES) tablet 0.1 mg    hydrALAZINE (APRESOLINE) injection 10 mg    sodium chloride 0.9 % injection 10 mL    sodium chloride 0.9 % injection 2 mL    sodium chloride 0.9% infusion    heparin 2 unit/mL in NaCL 0.9% solution    lidocaine HCl (PF) 2 % PF injection    MIDazolam (VERSED) injection    fentaNYL (SUBLIMAZE) injection    verapamil (ISOPTIN IV) injection    heparin (porcine) injection    iohexol (OMNIPAQUE 350 INJECT) contrast solution    acetaminophen (TYLENOL) tablet 650 mg    Or    acetaminophen (TYLENOL) suppository 650 mg    HYDROcodone-acetaminophen (NORCO) 5-325 MG per tablet 1 tablet    cloNIDine (CATAPRES) tablet 0.1 mg    hydrALAZINE (APRESOLINE) injection 10 mg    nitroGLYCERIN (NITROSTAT) sublingual tablet 0.4 mg    sodium chloride 0.9 % injection 10 mL    sodium chloride 0.9 % injection 2 mL    sodium chloride 0.9% infusion    sodium chloride (NORMAL SALINE) 0.9 % bolus 250 mL    atropine injection 0.5 mg    lidocaine 1 % injection 10 mg    morphine injection 2 mg    MIDazolam (VERSED) injection 1 mg    nitroGLYCERIN (NITROSTAT) sublingual tablet 0.4 mg       Social History     Tobacco Use   Smoking Status Every Day    Current packs/day: 0.50    Average packs/day: 0.5 packs/day for 49.0 years (24.5 ttl pk-yrs)    Types: Cigarettes    Passive exposure: Never   Smokeless Tobacco Never       Social History     Substance and Sexual Activity   Alcohol Use Not Currently       History   Drug Use No       Occupation:   at [employer]  Living situation:  has supportive family nearby    Family History   Problem Relation Age of Onset    Heart Mother     Parkinsonism Mother     Cancer Mother 86        Vaginal/cervical Cancer    Neurological Disorder Sister         Cerebral palsy    Alcohol Abuse Father          from alcoholism    Alcohol Abuse Sister           from alcoholism     Family history reviewed.  Pertinent cardiac family history:  NO    Review of Systems:  10 point review of systems negative except for those mentioned above in HPI.     Physical Exam:    Visit Vitals  BP (!) 151/83   Pulse 89   Temp 98.2 °F (36.8 °C) (Oral)   Resp 16   Ht 5' 10\" (1.778 m)   Wt 78 kg (171 lb 15.3 oz)   SpO2 95%   BMI 24.67 kg/m²     Ht Readings from Last 1 Encounters:   25 5' 10\" (1.778 m)     Wt Readings from Last 1 Encounters:   25 78 kg (171 lb 15.3 oz)     Body mass index is 24.67 kg/m².    Tele:  NSR    General:  Comfortable-appearing male, no acute distress, well developed and well nourished  Eyes:  Normal sclerae, normal conjunctivae, EOMs intact  Mouth:  Dentition adequate repair, tongue midline, mucous membranes moist  Neck:  No tracheal deviation or masses  Cardiovascular:  Normal S1/S2, no murmur, no rub, and normal pedal pulses  Extremities:  No lower extremity edema, no stasis changes, no varicose veins in bilateral lower extremities and normal strength in bilateral lower extremities   Respiratory:  No wheezing/crackles/rhonchi/stridor and no accessory muscle use or retractions  Abdomen:  No tenderness and no masses, nondistended  Skin:  Warm/dry, no lesions  Neuro:  Alert and oriented x3      Labs:  Lab Results   Component Value Date    SODIUM 141 2025    POTASSIUM 4.0 2025    CHLORIDE 113 (H) 2025    CO2 24 2025    GLUCOSE 188 (H) 2025    BUN 13 2025    CREATININE 0.69 2025    CALCIUM 9.3 2025    ALBUMIN 3.7 2025    BILIRUBIN 0.5 2025    AST 17 2025    INR 1.1 10/24/2012     Lab Results   Component Value Date    PTT 30 10/24/2012    WBC 10.9 2025    HGB 15.2 2025    HCT 45.5 2025     2025     (H) 2012    CRP 0.4 2016    TSH 8.005 (H) 2025       Diagnostics:  Cardiac catheterization 25    The ejection fraction is estimated to  be 50%.    Prox RCA to Dist RCA lesion with 100% stenosis.    Mid LAD lesion with 80% stenosis in 2nd Diag.     Assessment:  Right Radial Artery Access  Right Dominant System  Coronary Anatomy  Left Main: 5 mm vessel without significant disease.  Bifurcates in LAD and circumflex artery  LAD: Type III LAD with 80% mid stenoses at the D2 bifurcation which also has a proximal 80% stenoses and (Lane 1, 1, 1)  LCX: Nondominant vessel with minimal diffuse disease throughout  RCA: Dominant vessel with 100% occlusion at the proximal segment.  Collaterals from the left system fills the PDA and KATJA.  LV  LV Angiogram: Mild hypokinesis of the mid to apical inferior wall.  LVEF approximately 50%  LVEDP: 24 mmHg     Plan:  TR Band X 1 Hour  Medications: No medication changes at this time  Patient will require coronary artery bypass grafting prior to his upcoming back surgery.  Dr. Proctor to discuss with patient and discussed with surgeon surrounding timing.  Patient able to discharge home today and follow-up as an outpatient.  Diagnostic  Dominance: Right  Left Anterior Descending   Mid LAD lesion with 80% stenosis with side branch in 2nd Diag.      Right Coronary Artery   Prox RCA to Dist RCA lesion with 100% stenosis.      Third Right Posterolateral Branch   Collaterals   3rd RPL filled by collaterals from Mid LAD.                TTE 3/25/25  Final Impressions    * Normal left ventricular chamber size.  Normal left ventricular systolic  function.LVEF=57 %.    * Mildly increased left ventricular wall thickness.  Grade I left  ventricular diastolic dysfunction.    * Normal right ventricular chamber size.  Normal right ventricular systolic  function.    * Aortic valve sclerosis without stenosis or regurgitation.    * No pericardial effusion.    * Prior study from 2020 is not available for direct comparison.    STS Risk Calculator:  Procedure Type: Isolated CABG   Perioperative Outcome Estimate %   Operative Mortality 0.455%    Morbidity & Mortality 3.73%   Stroke 0.605%   Renal Failure 0.318%   Reoperation 1.82%   Prolonged Ventilation 1.71%   Deep Sternal Wound Infection 0.227%   Long Hospital Stay (>14 days) 2.17%   Short Hospital Stay (<6 days) 67%     Fraility Index  3 - managing well    New York Heart Association Classification  Class I: no limitation of physical activity    CHADSVASC  CHADSVASc Stroke Risk Score = 4    Impression:  Severe multivessel CAD s/p PCI to ostial + proximal RCA  HLD  T2DM (A1C 10.8, on insulin)  Hypothyroidism  tobacco use  lumbar disc herniation pending surgery    Plan:  Would benefit from CABG. Additional findings, risks/benefits, and surgical recommendations per Dr. Florez.    Pharmacy Consult for preoperative amiodarone    Patient does  wish to receive blood products if necessary.     Elyssa Hernandes PA-C  4/14/2025     No
